# Patient Record
Sex: FEMALE | Race: WHITE | Employment: OTHER | URBAN - METROPOLITAN AREA
[De-identification: names, ages, dates, MRNs, and addresses within clinical notes are randomized per-mention and may not be internally consistent; named-entity substitution may affect disease eponyms.]

---

## 2017-04-20 ENCOUNTER — OFFICE VISIT (OUTPATIENT)
Dept: OBGYN CLINIC | Age: 32
End: 2017-04-20

## 2017-04-20 VITALS
HEIGHT: 69 IN | SYSTOLIC BLOOD PRESSURE: 120 MMHG | BODY MASS INDEX: 36.23 KG/M2 | DIASTOLIC BLOOD PRESSURE: 70 MMHG | WEIGHT: 244.6 LBS

## 2017-04-20 DIAGNOSIS — Z01.419 ENCOUNTER FOR GYNECOLOGICAL EXAMINATION WITHOUT ABNORMAL FINDING: Primary | ICD-10-CM

## 2017-04-20 DIAGNOSIS — Z80.3 FAMILY HISTORY OF BREAST CANCER IN MOTHER: ICD-10-CM

## 2017-04-20 NOTE — PROGRESS NOTES
Priti Leonardo is a [de-identified] P5,  36 y.o. female [facility] whose Patient's last menstrual period was 04/04/2017. who presents for her annual checkup. She is having no significant problems. Now has law partner    With regard to the Gardisil vaccine, she is older than the FDA approved age to receive it. Menstrual status:    Her periods are moderate in flow. She is using three to five pads or tampons per day, usually irregular and every 26-35 days. Has always been irregular off OC    She denies dysmenorrhea. She reports no premenstrual symptoms. Contraception:    The current method of family planning is condoms currently    Sexual history:    She  reports that she currently engages in sexual activity and has had male partners. She reports using the following method of birth control/protection: Pill. Patient states she has not taken the pill in 4 months due to her needing the rx refilled  Medical conditions:    Since her last annual GYN exam about 12/17/15 ago, she has not the following changes in her health history: none. Pap and Mammogram History:    Her most recent Pap smear was normal obtained 12/17/15    The patient has never had a mammogram. Had MRI 2014 normal    The patient does have a family history of breast cancer. - mother 28    Past Medical History:   Diagnosis Date    Dysmenorrhea     Dr. Alba Javed Family history of breast cancer     mother age 28. seeing Dr. Sherry Catalan    HPV vaccine counseling     gardasil series complete    Patellofemoral arthralgia of both knees     hx of rowing, catching     History reviewed. No pertinent surgical history.     Current Outpatient Prescriptions   Medication Sig Dispense Refill    Adelaide Colbyant 1/35, 28, 1-35 mg-mcg per tablet   4     Allergies: Augmentin [amoxicillin-pot clavulanate] and Penicillins   Social History     Social History    Marital status:      Spouse name: Von Calvillo Number of children: 0    Years of education: N/A Occupational History     [facility]     Social History Main Topics    Smoking status: Never Smoker    Smokeless tobacco: Never Used    Alcohol use Yes      Comment: 2-3 drinks per week    Drug use: No    Sexual activity: Yes     Partners: Male     Birth control/ protection: Pill     Other Topics Concern    Not on file     Social History Narrative     Tobacco History:  reports that she has never smoked. She has never used smokeless tobacco.  Alcohol Abuse:  reports that she drinks alcohol. Drug Abuse:  reports that she does not use illicit drugs.     Patient Active Problem List   Diagnosis Code    Family history of breast cancer Z80.3    Dysmenorrhea N94.6       Review of Systems - History obtained from the patient  Constitutional: negative for weight loss, fever, night sweats  HEENT: negative for hearing loss, earache, congestion, snoring, sorethroat  CV: negative for chest pain, palpitations, edema  Resp: negative for cough, shortness of breath, wheezing  GI: negative for change in bowel habits, abdominal pain, black or bloody stools  : negative for frequency, dysuria, hematuria, vaginal discharge  MSK: negative for back pain, joint pain, muscle pain  Breast: negative for breast lumps, nipple discharge, galactorrhea  Skin :negative for itching, rash, hives  Neuro: negative for dizziness, headache, confusion, weakness  Psych: negative for anxiety, depression, change in mood  Heme/lymph: negative for bleeding, bruising, pallor    Physical Exam    Visit Vitals    /70    Ht 5' 8.75\" (1.746 m)    Wt 244 lb 9.6 oz (110.9 kg)    LMP 04/04/2017    BMI 36.38 kg/m2       Constitutional  · Appearance: well-nourished, well developed, alert, in no acute distress    HENT  · Head and Face: appears normal    Neck  · Inspection/Palpation: normal appearance, no masses or tenderness  · Lymph Nodes: no lymphadenopathy present  · Thyroid: gland size normal, nontender, no nodules or masses present on palpation    Chest  · Respiratory Effort: breathing normal  · Auscultation: normal breath sounds    Cardiovascular  · Heart:  · Auscultation: regular rate and rhythm without murmur    Breasts  · Inspection of Breasts: breasts symmetrical, no skin changes, no discharge present, nipple appearance normal, no skin retraction present  · Palpation of Breasts and Axillae: no masses present on palpation, no breast tenderness  · Axillary Lymph Nodes: no lymphadenopathy present    Gastrointestinal  · Abdominal Examination: abdomen non-tender to palpation, normal bowel sounds, no masses present  · Liver and spleen: no hepatomegaly present, spleen not palpable  · Hernias: no hernias identified    Genitourinary  · External Genitalia: normal appearance for age, no discharge present, no tenderness present, no inflammatory lesions present, no masses present, no atrophy present  · Vagina: normal vaginal vault without central or paravaginal defects, no discharge present, no inflammatory lesions present, no masses present  · Bladder: non-tender to palpation  · Urethra: appears normal  · Cervix: normal   · Uterus: normal size, shape and consistency  · Adnexa: no adnexal tenderness present, no adnexal masses present  · Perineum: perineum within normal limits, no evidence of trauma, no rashes or skin lesions present  · Anus: anus within normal limits, no hemorrhoids present  · Inguinal Lymph Nodes: no lymphadenopathy present    Skin  · General Inspection: no rash, no lesions identified    Neurologic/Psychiatric  · Mental Status:  · Orientation: grossly oriented to person, place and time  · Mood and Affect: mood normal, affect appropriate    . Assessment:  Routine gynecologic examination  Her current medical status is satisfactory with no evidence of significant gynecologic issues.   Mother breast cancer age 28  Hx of irregular cycles  Plan:  Counseled re: diet, exercise, healthy lifestyle  Return for yearly wellness visits  Rec screening mammo - she will see Dr. Oliver Glaser again to discuss best option - may do MRI again  OC rx given - may not start if decides to conceive.  Advised to take vits

## 2017-04-20 NOTE — PATIENT INSTRUCTIONS
Breast Self-Exam: Care Instructions  Your Care Instructions  A breast self-exam is when you check your breasts for lumps or changes. This regular exam helps you learn how your breasts normally look and feel. Most breast problems or changes are not because of cancer. Breast self-exam is not a substitute for a mammogram. Having regular breast exams by your doctor and regular mammograms improve your chances of finding any problems with your breasts. Some women set a time each month to do a step-by-step breast self-exam. Other women like a less formal system. They might look at their breasts as they brush their teeth, or feel their breasts once in a while in the shower. If you notice a change in your breast, tell your doctor. Follow-up care is a key part of your treatment and safety. Be sure to make and go to all appointments, and call your doctor if you are having problems. Its also a good idea to know your test results and keep a list of the medicines you take. How do you do a breast self-exam?  · The best time to examine your breasts is usually one week after your menstrual period begins. Your breasts should not be tender then. If you do not have periods, you might do your exam on a day of the month that is easy to remember. · To examine your breasts:  ¨ Remove all your clothes above the waist and lie down. When you are lying down, your breast tissue spreads evenly over your chest wall, which makes it easier to feel all your breast tissue. ¨ Use the pads--not the fingertips--of the 3 middle fingers of your left hand to check your right breast. Move your fingers slowly in small coin-sized circles that overlap. ¨ Use three levels of pressure to feel of all your breast tissue. Use light pressure to feel the tissue close to the skin surface. Use medium pressure to feel a little deeper. Use firm pressure to feel your tissue close to your breastbone and ribs.  Use each pressure level to feel your breast tissue before moving on to the next spot. ¨ Check your entire breast, moving up and down as if following a strip from the collarbone to the bra line, and from the armpit to the ribs. Repeat until you have covered the entire breast.  ¨ Repeat this procedure for your left breast, using the pads of the 3 middle fingers of your right hand. · To examine your breasts while in the shower:  ¨ Place one arm over your head and lightly soap your breast on that side. ¨ Using the pads of your fingers, gently move your hand over your breast (in the strip pattern described above), feeling carefully for any lumps or changes. ¨ Repeat for the other breast.  · Have your doctor inspect anything you notice to see if you need further testing. Where can you learn more? Go to http://jared-leia.info/. Enter P148 in the search box to learn more about \"Breast Self-Exam: Care Instructions. \"  Current as of: July 26, 2016  Content Version: 11.2  © 3925-7385 [employer]. Care instructions adapted under license by [employer] (which disclaims liability or warranty for this information). If you have questions about a medical condition or this instruction, always ask your healthcare professional. [place] [address] any warranty or liability for your use of this information.

## 2017-05-08 ENCOUNTER — OFFICE VISIT (OUTPATIENT)
Dept: SURGERY | Age: 32
End: 2017-05-08

## 2017-05-08 ENCOUNTER — TELEPHONE (OUTPATIENT)
Dept: SURGERY | Age: 32
End: 2017-05-08

## 2017-05-08 VITALS
WEIGHT: 243 LBS | HEART RATE: 57 BPM | DIASTOLIC BLOOD PRESSURE: 68 MMHG | HEIGHT: 69 IN | BODY MASS INDEX: 35.99 KG/M2 | SYSTOLIC BLOOD PRESSURE: 123 MMHG

## 2017-05-08 DIAGNOSIS — Z91.89 AT HIGH RISK FOR BREAST CANCER: ICD-10-CM

## 2017-05-08 DIAGNOSIS — Z80.3 FAMILY HISTORY OF BREAST CANCER: Primary | ICD-10-CM

## 2017-05-08 NOTE — PROGRESS NOTES
HISTORY OF PRESENT ILLNESS  [place] is a 36 y.o. female. HPI   ESTABLISHED patient here for FH of breast cancer. FH is as below, and there have been no changes. She has no breast complaints today. Recently had her annual exam with Dr. Pooja Fountain. Has no breast lumps, no nipple discharge/retraction or skin change. She did have a breast MRI in 2014, but has not had one since then. Had a mammogram in 2014, and this was normal other than dense breast tissue. Family history includes mother diagnosed with breast cancer age 35(Mallory Zavala Pramod ), maternal cousin, maternal aunt also with breast cancer all post menopausal. Her mother was BRCA 1/2 negative (testing done several years ago).       [facility]    Physical Exam    ASSESSMENT and PLAN  {ASSESSMENT/PLAN:90169}

## 2017-05-08 NOTE — PATIENT INSTRUCTIONS
Breast Cancer (BRCA) Gene Testing: Care Instructions  Your Care Instructions  BRCA1 and BRCA2 are genes that help control normal cell growth. Sometimes, people inherit changes in one of these genes. These changes are called mutations. If you inherit a BRCA (say \"Sherif\") mutation, you have a greater risk of breast or ovarian cancer. You also have a higher risk of breast or ovarian cancer if you have a family history of them. Some people have a family history, but they don't have the BRCA mutation. To find out if you have the BRCA mutation, you can have a blood test. People who have the mutation have a higher risk for these cancers. But not everyone with the mutation gets cancer. Talk to your doctor about things that may increase your risk. Let your doctor know if you or a family member had or has:  · A positive test for BRCA. · Breast cancer before age 48. · Ovarian cancer at any age. · Male breast cancer. · Breast cancer in both breasts. · Both breast and ovarian cancer. · [place]. Your doctor may also want you to talk with a genetic counselor. The counselor can help you understand what the results of this test might mean. Follow-up care is a key part of your treatment and safety. Be sure to make and go to all appointments, and call your doctor if you are having problems. It's also a good idea to know your test results and keep a list of the medicines you take. Why should you have BRCA testing? You may feel better if the test shows that you don't have a BRCA mutation. This is called a negative result. If the test shows that you do have a BRCA mutation, it's called a positive result. In this case, you may be able to make some decisions that could reduce your cancer risk. The information may also be helpful to your family and loved ones. What are the risks of BRCA testing? A negative test may give you a false sense of security.  So you may not have the regular tests that help find cancer at an early stage. But a negative BRCA test does not mean that you will never have breast or ovarian cancer. A positive test result may cause anxiety or depression. A positive BRCA test does not mean that you will definitely get breast or ovarian cancer. It's important to think carefully about what the test results could mean for you. A genetic counselor can help you do this. What can you do to reduce the risk of breast cancer? Your risk for breast cancer increases as you get older. There is no known way to prevent breast cancer. But with some cancers, finding them early can increase your chances of successful treatment. Here are some steps you can take to help reduce your risk:  · Get familiar with the look and feel of your breasts. This will help you notice any changes. Call your doctor if you notice a change. · Have regular breast exams by your doctor or nurse. Ask your doctor how often you should get them. · Have regular mammograms. A mammogram is a picture of your breast tissue. It can find changes in your breast before you can feel them. Talk to your doctor about when to get this test.  You can also help take care of yourself and reduce your risk of cancer if you:  · Stay at a healthy weight. · Eat a healthy, low-fat diet. · Get some exercise every day. If you don't usually exercise, walking is a good way to start. · Don't smoke. If you need help quitting, talk to your doctor about stop-smoking programs and medicines. These can increase your chances of quitting for good. · Drink alcohol in moderation. Or don't drink alcohol at all. · Breastfeed. There is some evidence that breastfeeding may lower the risk of breast cancer. The benefit seems to be greatest in women who have  for longer than 12 months or who  several children. Men and women who do a gene test and find out that they have a BRCA gene change have some options to manage their cancer risk.   · Women who haven't had cancer may want to think about starting breast cancer screening at a younger age, taking medicine, and having preventive surgery. · Men may want to think about doing breast self-exams, having clinical breast exams, and having prostate cancer screening. If you have a BRCA gene change, talk with your doctor. He or she will help you manage your cancer risk. Where can you learn more? Go to http://jared-leia.info/. Enter U252 in the search box to learn more about \"Breast Cancer (BRCA) Gene Testing: Care Instructions. \"  Current as of: August 31, 2016  Content Version: 11.2  © 0125-6083 [employer]. Care instructions adapted under license by [employer] (which disclaims liability or warranty for this information). If you have questions about a medical condition or this instruction, always ask your healthcare professional. [address] any warranty or liability for your use of this information.

## 2017-05-08 NOTE — MR AVS SNAPSHOT
Visit Information Date & Time Provider Department Dept. Phone Encounter #  
 5/8/2017  9:25 PM Radha Campos MD [facility] [phone] [id number] Your Appointments 5/14/2018  8:30 AM  
Follow Up with Radha Campos MD  
[facility] [place] [address]) Appt Note: 1 year follow up/ cp$ 5-8-17 [facility] [place] [address] [facility] [address]  
  
   
 [place] [address] [place] [address] [facility] Upcoming Health Maintenance Date Due DTaP/Tdap/Td series (2 - Tdap) 7/23/2008 INFLUENZA AGE 9 TO ADULT 8/1/2017 PAP AKA CERVICAL CYTOLOGY 12/17/2018 Allergies as of 5/8/2017  Review Complete On: 5/8/2017 By: Joel Leone RN Severity Noted Reaction Type Reactions Augmentin [Amoxicillin-pot Clavulanate]  09/03/2014    Hives Bactrim [Sulfamethoprim]  05/08/2017    Hives Penicillins  09/03/2014    Unable to Obtain Current Immunizations  Reviewed on 11/3/2014 Name Date Hep B Vaccine 1/1/2005 Td 1/1/2005 Not reviewed this visit Vitals BP Pulse Height(growth percentile) Weight(growth percentile) LMP BMI  
 123/68 (!) 57 5' 8.75\" (1.746 m) 243 lb (110.2 kg) 04/04/2017 36.15 kg/m2 OB Status Smoking Status Having regular periods Never Smoker BMI and BSA Data Body Mass Index Body Surface Area  
 36.15 kg/m 2 2.31 m 2 Preferred Pharmacy Pharmacy Name Phone [id number], [address] [place] [address]  [phone] Your Updated Medication List  
  
Notice  As of 5/8/2017  4:16 PM  
 You have not been prescribed any medications. Patient Instructions Breast Cancer (BRCA) Gene Testing: Care Instructions Your Care Instructions BRCA1 and BRCA2 are genes that help control normal cell growth. Sometimes, people inherit changes in one of these genes.  These changes are called mutations. If you inherit a BRCA (say \"Sherif\") mutation, you have a greater risk of breast or ovarian cancer. You also have a higher risk of breast or ovarian cancer if you have a family history of them. Some people have a family history, but they don't have the BRCA mutation. To find out if you have the BRCA mutation, you can have a blood test. People who have the mutation have a higher risk for these cancers. But not everyone with the mutation gets cancer. Talk to your doctor about things that may increase your risk. Let your doctor know if you or a family member had or has: · A positive test for BRCA. · Breast cancer before age 48. · Ovarian cancer at any age. · Male breast cancer. · Breast cancer in both breasts. · Both breast and ovarian cancer. · [place]. Your doctor may also want you to talk with a genetic counselor. The counselor can help you understand what the results of this test might mean. Follow-up care is a key part of your treatment and safety. Be sure to make and go to all appointments, and call your doctor if you are having problems. It's also a good idea to know your test results and keep a list of the medicines you take. Why should you have BRCA testing? You may feel better if the test shows that you don't have a BRCA mutation. This is called a negative result. If the test shows that you do have a BRCA mutation, it's called a positive result. In this case, you may be able to make some decisions that could reduce your cancer risk. The information may also be helpful to your family and loved ones. What are the risks of BRCA testing? A negative test may give you a false sense of security. So you may not have the regular tests that help find cancer at an early stage. But a negative BRCA test does not mean that you will never have breast or ovarian cancer. A positive test result may cause anxiety or depression.  A positive BRCA test does not mean that you will definitely get breast or ovarian cancer. It's important to think carefully about what the test results could mean for you. A genetic counselor can help you do this. What can you do to reduce the risk of breast cancer? Your risk for breast cancer increases as you get older. There is no known way to prevent breast cancer. But with some cancers, finding them early can increase your chances of successful treatment. Here are some steps you can take to help reduce your risk: 
· Get familiar with the look and feel of your breasts. This will help you notice any changes. Call your doctor if you notice a change. · Have regular breast exams by your doctor or nurse. Ask your doctor how often you should get them. · Have regular mammograms. A mammogram is a picture of your breast tissue. It can find changes in your breast before you can feel them. Talk to your doctor about when to get this test. 
You can also help take care of yourself and reduce your risk of cancer if you: 
· Stay at a healthy weight. · Eat a healthy, low-fat diet. · Get some exercise every day. If you don't usually exercise, walking is a good way to start. · Don't smoke. If you need help quitting, talk to your doctor about stop-smoking programs and medicines. These can increase your chances of quitting for good. · Drink alcohol in moderation. Or don't drink alcohol at all. · Breastfeed. There is some evidence that breastfeeding may lower the risk of breast cancer. The benefit seems to be greatest in women who have  for longer than 12 months or who  several children. Men and women who do a gene test and find out that they have a BRCA gene change have some options to manage their cancer risk. · Women who haven't had cancer may want to think about starting breast cancer screening at a younger age, taking medicine, and having preventive surgery. · Men may want to think about doing breast self-exams, having clinical breast exams, and having prostate cancer screening. If you have a BRCA gene change, talk with your doctor. He or she will help you manage your cancer risk. Where can you learn more? Go to http://jared-leia.info/. Enter U252 in the search box to learn more about \"Breast Cancer (BRCA) Gene Testing: Care Instructions. \" Current as of: August 31, 2016 Content Version: 11.2 © 4170-3483 [employer]. Care instructions adapted under license by [employer] (which disclaims liability or warranty for this information). If you have questions about a medical condition or this instruction, always ask your healthcare professional. [address] any warranty or liability for your use of this information. Please provide this summary of care documentation to your next provider. Your primary care clinician is listed as Nicole Wooten. If you have any questions after today's visit, please call [phone].

## 2017-05-08 NOTE — PROGRESS NOTES
HISTORY OF PRESENT ILLNESS  [place] is a 36 y.o. female. HPI  ESTABLISHED patient here for FH of breast cancer. FH is as below, and there have been no changes. She has no breast complaints today. Recently had her annual exam with Dr. Abdoulaye Blandon. Has no breast lumps, no nipple discharge/retraction or skin change. She did have a breast MRI in , but has not had one since then. Had a mammogram in 2014, and this was normal other than dense breast tissue. Family history includes mother diagnosed with breast cancer age 35(Mallory Dawson ), maternal cousin, maternal aunt also with breast cancer all post menopausal. Her mother was BRCA 1/2 negative (testing done several years ago). Past Medical History:   Diagnosis Date    Dysmenorrhea     Dr. Christos Crum Family history of breast cancer     mother age 28. seeing Dr. Diaz Garcia    HPV vaccine counseling     gardasil series complete    Patellofemoral arthralgia of both knees     hx of rowing, catching       History reviewed. No pertinent surgical history. Social History     Social History    Marital status:      Spouse name: Susan Current Number of children: 0    Years of education: N/A     Occupational History     [employer]     Social History Main Topics    Smoking status: Never Smoker    Smokeless tobacco: Never Used    Alcohol use Yes      Comment: 2-3 drinks per week    Drug use: No    Sexual activity: Yes     Partners: Male     Birth control/ protection: Pill     Other Topics Concern    Not on file     Social History Narrative       No current outpatient prescriptions on file prior to visit. No current facility-administered medications on file prior to visit.         Allergies   Allergen Reactions    Augmentin [Amoxicillin-Pot Clavulanate] Hives    Bactrim [Sulfamethoprim] Hives    Penicillins Unable to Obtain       OB History      Para Term  AB TAB SAB Ectopic Multiple Living    0 0 0 0 0 0 0 0 0 0        Obstetric Comments    Menarche:  17. LMP: 08/26/2014. # of Children:  n/a. Age at Delivery of First Child:  n/a. Hysterectomy/oophorectomy:  NO/NO. Breast Bx no. Hx of Breast Feeding:  n/a. BCP:  yes. Hormone therapy:  no.          ROS  Constitutional: Negative    HENT: Negative. Eyes: Negative. Respiratory: Negative. Cardiovascular: Negative. Gastrointestinal: Negative. Genitourinary: Negative. Musculoskeletal: Negative. Skin: Negative. Neurological: Negative. Endo/Heme/Allergies: Negative. Psychiatric/Behavioral: Negative. Physical Exam   Cardiovascular: Normal rate and normal heart sounds. Pulmonary/Chest: Breath sounds normal. Right breast exhibits no inverted nipple, no mass, no nipple discharge, no skin change and no tenderness. Left breast exhibits no inverted nipple, no mass, no nipple discharge, no skin change and no tenderness. Breasts are symmetrical.   Lymphadenopathy:        Right cervical: No superficial cervical, no deep cervical and no posterior cervical adenopathy present. Left cervical: No superficial cervical, no deep cervical and no posterior cervical adenopathy present. Right axillary: No pectoral and no lateral adenopathy present. Left axillary: No pectoral and no lateral adenopathy present. ASSESSMENT and PLAN    ICD-10-CM ICD-9-CM    1. Family history of breast cancer Z80.3 V16.3    2. At high risk for breast cancer Z91.89 V49.89       Pt with significant family hx of BC and doing well. Her lifetime risk for breast cancer is at 24.3% (Barbaraer-Nicole). Discussed Videssa Breast test as possible additional screening option, which uses blood work to test for tumor antibodies. Pt expressed interest in this test and will have blood drawn today for Videssa. Will also order MRI and f/u once these results become available. This plan was reviewed with the patient and patient agrees. All questions were answered.     Written by Jim Del Castillo Marielos Ko, as dictated by Dr. Michael Cameron MD.

## 2017-05-09 NOTE — COMMUNICATION BODY
HISTORY OF PRESENT ILLNESS  [place] is a 36 y.o. female. HPI  ESTABLISHED patient here for FH of breast cancer. FH is as below, and there have been no changes. She has no breast complaints today. Recently had her annual exam with Dr. Lubna Almeida. Has no breast lumps, no nipple discharge/retraction or skin change. She did have a breast MRI in , but has not had one since then. Had a mammogram in 2014, and this was normal other than dense breast tissue. Family history includes mother diagnosed with breast cancer age 35(Mallory Mitchell ), maternal cousin, maternal aunt also with breast cancer all post menopausal. Her mother was BRCA 1/2 negative (testing done several years ago). Past Medical History:   Diagnosis Date    Dysmenorrhea     Dr. Steiner Senior Family history of breast cancer     mother age 28. seeing Dr. Marcela Todd    HPV vaccine counseling     gardasil series complete    Patellofemoral arthralgia of both knees     hx of rowing, catching       History reviewed. No pertinent surgical history. Social History     Social History    Marital status:      Spouse name: Sergio Conrad Number of children: 0    Years of education: N/A     Occupational History     [employer]     Social History Main Topics    Smoking status: Never Smoker    Smokeless tobacco: Never Used    Alcohol use Yes      Comment: 2-3 drinks per week    Drug use: No    Sexual activity: Yes     Partners: Male     Birth control/ protection: Pill     Other Topics Concern    Not on file     Social History Narrative       No current outpatient prescriptions on file prior to visit. No current facility-administered medications on file prior to visit.         Allergies   Allergen Reactions    Augmentin [Amoxicillin-Pot Clavulanate] Hives    Bactrim [Sulfamethoprim] Hives    Penicillins Unable to Obtain       OB History      Para Term  AB TAB SAB Ectopic Multiple Living    0 0 0 0 0 0 0 0 0 0        Obstetric Comments    Menarche:  17. LMP: 08/26/2014. # of Children:  n/a. Age at Delivery of First Child:  n/a. Hysterectomy/oophorectomy:  NO/NO. Breast Bx no. Hx of Breast Feeding:  n/a. BCP:  yes. Hormone therapy:  no.          ROS  Constitutional: Negative    HENT: Negative. Eyes: Negative. Respiratory: Negative. Cardiovascular: Negative. Gastrointestinal: Negative. Genitourinary: Negative. Musculoskeletal: Negative. Skin: Negative. Neurological: Negative. Endo/Heme/Allergies: Negative. Psychiatric/Behavioral: Negative. Physical Exam   Cardiovascular: Normal rate and normal heart sounds. Pulmonary/Chest: Breath sounds normal. Right breast exhibits no inverted nipple, no mass, no nipple discharge, no skin change and no tenderness. Left breast exhibits no inverted nipple, no mass, no nipple discharge, no skin change and no tenderness. Breasts are symmetrical.   Lymphadenopathy:        Right cervical: No superficial cervical, no deep cervical and no posterior cervical adenopathy present. Left cervical: No superficial cervical, no deep cervical and no posterior cervical adenopathy present. Right axillary: No pectoral and no lateral adenopathy present. Left axillary: No pectoral and no lateral adenopathy present. ASSESSMENT and PLAN    ICD-10-CM ICD-9-CM    1. Family history of breast cancer Z80.3 V16.3    2. At high risk for breast cancer Z91.89 V49.89       Pt with significant family hx of BC and doing well. Her lifetime risk for breast cancer is at 24.3% (Barbaraer-Nicole). Discussed Videssa Breast test as possible additional screening option, which uses blood work to test for tumor antibodies. Pt expressed interest in this test and will have blood drawn today for Videssa. Will also order MRI and f/u once these results become available. This plan was reviewed with the patient and patient agrees. All questions were answered.     Written by Venkata Guajardo Wendy Robledo, as dictated by Dr. Sae Cota MD.

## 2017-05-11 ENCOUNTER — DOCUMENTATION ONLY (OUTPATIENT)
Dept: SURGERY | Age: 32
End: 2017-05-11

## 2017-05-23 ENCOUNTER — TELEPHONE (OUTPATIENT)
Dept: SURGERY | Age: 32
End: 2017-05-23

## 2017-05-23 NOTE — TELEPHONE ENCOUNTER
L/M for patient per HIPAA that her Videssa Breast test came back low protein signature. I advised her to continue with the plan to get the breast MRI and follow-up per Dr. Ayan Fraga. I left my name and number for her to call me back if she had questions.

## 2017-06-21 ENCOUNTER — OFFICE VISIT (OUTPATIENT)
Dept: INTERNAL MEDICINE CLINIC | Age: 32
End: 2017-06-21

## 2017-06-21 VITALS
BODY MASS INDEX: 35.99 KG/M2 | RESPIRATION RATE: 12 BRPM | HEIGHT: 69 IN | SYSTOLIC BLOOD PRESSURE: 113 MMHG | HEART RATE: 64 BPM | WEIGHT: 243 LBS | DIASTOLIC BLOOD PRESSURE: 77 MMHG | TEMPERATURE: 97.7 F

## 2017-06-21 DIAGNOSIS — Z20.9 HISTORY OF EXPOSURE TO INFECTIOUS DISEASE: ICD-10-CM

## 2017-06-21 DIAGNOSIS — R11.0 NAUSEA: ICD-10-CM

## 2017-06-21 DIAGNOSIS — R19.7 DIARRHEA OF PRESUMED INFECTIOUS ORIGIN: Primary | ICD-10-CM

## 2017-06-21 RX ORDER — METRONIDAZOLE 500 MG/1
500 TABLET ORAL 3 TIMES DAILY
Qty: 21 TAB | Refills: 0 | Status: SHIPPED | OUTPATIENT
Start: 2017-06-21 | End: 2018-05-14 | Stop reason: ALTCHOICE

## 2017-06-21 RX ORDER — ONDANSETRON 8 MG/1
8 TABLET, ORALLY DISINTEGRATING ORAL
Qty: 30 TAB | Refills: 0 | Status: SHIPPED | OUTPATIENT
Start: 2017-06-21 | End: 2018-05-14 | Stop reason: ALTCHOICE

## 2017-06-21 NOTE — PROGRESS NOTES
HISTORY OF PRESENT ILLNESS  [place] is a 36 y.o. female. HPI  Presents accompanied by her  with complaints of fever, chills since yesterday morning, then developed profuse watery diarrhea since 3 pm yesterday afternoon. Has passed 15+ stools since then. Concerned about possible C. Diff infection since she and her  were assisting his father in [address] [facility] who was recently hospitalized for dehydration and diarrheal illness and was subsequently diagnosed with C. Diff infection. She reports that she was primarily the one who was cleaning the bathroom to assist her father-in-law. She has been drinking fluids but does not have much of an appetite. Denies nausea or vomiting. Has not noted any blood in stools. Fever and chills have been mild today but she had sweats last night. Having cramping abdominal pain that has been generalized. Has not taken any OTC medications for symptoms and denies recent personal antibiotic use. Review of Systems   Constitutional: Positive for chills, fever and malaise/fatigue. Respiratory: Negative for cough. Cardiovascular: Negative for chest pain and palpitations. Gastrointestinal: Positive for abdominal pain, diarrhea and nausea. Negative for blood in stool and vomiting. Genitourinary: Negative for dysuria and frequency. Musculoskeletal: Positive for myalgias. Skin: Negative for rash. Neurological: Negative for dizziness, tingling and headaches. /77 (BP 1 Location: Left arm, BP Patient Position: Sitting)  Pulse 64  Temp 97.7 °F (36.5 °C)  Resp 12  Ht 5' 8.75\" (1.746 m)  Wt 243 lb (110.2 kg)  BMI 36.15 kg/m2  Physical Exam   Constitutional: She is oriented to person, place, and time. She appears well-developed and well-nourished. HENT:   Head: Normocephalic and atraumatic. Neck: Normal range of motion. Neck supple. Cardiovascular: Normal rate and regular rhythm.     Pulmonary/Chest: Effort normal and breath sounds normal. She has no wheezes. Abdominal: Soft. Bowel sounds are increased. There is generalized tenderness. There is no rebound and no guarding. Neurological: She is alert and oriented to person, place, and time. Psychiatric: She has a normal mood and affect. Nursing note and vitals reviewed. ASSESSMENT and [address] was seen today for diarrhea. Diagnoses and all orders for this visit:    Diarrhea of presumed infectious origin -- will send out stool samples but treat empirically with Flagyl as she has been exposed to stool infected with C. Diff. Cautioned regarding avoidance of alcohol while taking Flagyl; she indicated understanding.  -     C DIFFICILE TOXIN A & B BY EIA  -     OVA+PARASITE + GIARDIA  -     CULTURE, STOOL  -     CBC WITH AUTOMATED DIFF  -     METABOLIC PANEL, COMPREHENSIVE  -     metroNIDAZOLE (FLAGYL) 500 mg tablet; Take 1 Tab by mouth three (3) times daily. History of exposure to infectious disease  -     metroNIDAZOLE (FLAGYL) 500 mg tablet; Take 1 Tab by mouth three (3) times daily. Nausea  -     ondansetron (ZOFRAN ODT) 8 mg disintegrating tablet; Take 1 Tab by mouth every eight (8) hours as needed for Nausea.       lab results and schedule of future lab studies reviewed with patient  reviewed diet, exercise and weight control  reviewed medications and side effects in detail

## 2017-06-21 NOTE — PATIENT INSTRUCTIONS
Diarrhea: Care Instructions  Your Care Instructions    Diarrhea is loose, watery stools (bowel movements). The exact cause is often hard to find. Sometimes diarrhea is your body's way of getting rid of what caused an upset stomach. Viruses, food poisoning, and many medicines can cause diarrhea. Some people get diarrhea in response to emotional stress, anxiety, or certain foods. Almost everyone has diarrhea now and then. It usually isn't serious, and your stools will return to normal soon. The important thing to do is replace the fluids you have lost, so you can prevent dehydration. The doctor has checked you carefully, but problems can develop later. If you notice any problems or new symptoms, get medical treatment right away. Follow-up care is a key part of your treatment and safety. Be sure to make and go to all appointments, and call your doctor if you are having problems. It's also a good idea to know your test results and keep a list of the medicines you take. How can you care for yourself at home? · Watch for signs of dehydration, which means your body has lost too much water. Dehydration is a serious condition and should be treated right away. Signs of dehydration are:  ¨ Increasing thirst and dry eyes and mouth. ¨ Feeling faint or lightheaded. ¨ Darker urine, and a smaller amount of urine than normal.  · To prevent dehydration, drink plenty of fluids, enough so that your urine is light yellow or clear like water. Choose water and other caffeine-free clear liquids until you feel better. If you have kidney, heart, or liver disease and have to limit fluids, talk with your doctor before you increase the amount of fluids you drink. · Begin eating small amounts of mild foods the next day, if you feel like it. ¨ Try yogurt that has live cultures of Lactobacillus. (Check the label.)  ¨ Avoid spicy foods, fruits, alcohol, and caffeine until 48 hours after all symptoms are gone.   ¨ Avoid chewing gum that contains sorbitol. ¨ Avoid dairy products (except for yogurt with Lactobacillus) while you have diarrhea and for 3 days after symptoms are gone. · The doctor may recommend that you take over-the-counter medicine, such as loperamide (Imodium), if you still have diarrhea after 6 hours. Read and follow all instructions on the label. Do not use this medicine if you have bloody diarrhea, a high fever, or other signs of serious illness. Call your doctor if you think you are having a problem with your medicine. When should you call for help? Call 911 anytime you think you may need emergency care. For example, call if:  · You passed out (lost consciousness). · Your stools are maroon or very bloody. Call your doctor now or seek immediate medical care if:  · You are dizzy or lightheaded, or you feel like you may faint. · Your stools are black and look like tar, or they have streaks of blood. · You have new or worse belly pain. · You have symptoms of dehydration, such as:  ¨ Dry eyes and a dry mouth. ¨ Passing only a little dark urine. ¨ Feeling thirstier than usual.  · You have a new or higher fever. Watch closely for changes in your health, and be sure to contact your doctor if:  · Your diarrhea is getting worse. · You see pus in the diarrhea. · You are not getting better after 2 days (48 hours). Where can you learn more? Go to http://jared-leia.info/. Enter L821 in the search box to learn more about \"Diarrhea: Care Instructions. \"  Current as of: March 20, 2017  Content Version: 11.3  © 8593-0181 [employer]. Care instructions adapted under license by [employer] (which disclaims liability or warranty for this information). If you have questions about a medical condition or this instruction, always ask your healthcare professional. [address] any warranty or liability for your use of this information.        Learning About Clostridium Difficile Infection  What is C. diff? Clostridium difficile (also called C. diff) is a type of bacteria. It can cause swelling and irritation of the colon (colitis). Colitis can cause watery diarrhea, fever, loss of appetite, nausea, dehydration, and belly pain and tenderness. C. diff infection is most common in people who are taking antibiotics or have taken them in the past few weeks. It is also more likely to infect older people and people who are getting chemotherapy for cancer. Though colitis can be mild, it can become serious, especially for people who have a weak immune system. The large intestine normally contains many good bacteria that keep it healthy and do not cause disease. When you take an antibiotic to kill bacteria that do cause disease, your medicine may also kill the good bacteria. This can allow C. diff bacteria to grow and release harmful toxins. If you are still taking an antibiotic, your doctor may have you stop taking it, because it may have led to the C. diff infection. Your doctor may then treat C. diff colitis with a different antibiotic. It's important that your C. diff does not spread to other people. Preventing the spread of C. diff is a top health concern in hospitals and long-term care facilities. How does C. diff get passed to other people? C. diff can be passed from person to person. It can also be passed from person to object to person. If you have a C. diff infection, you can spread it if you don't wash your hands well enough after you use the bathroom. Anything you touch, like a door handle, bed rail, or phone, can then carry the bacteria. C. diff can live on objects for a very long time. C. diff infection spreads to other people when they touch an object that has C. diff on it and then use their hands to eat or rub their faces. Health care workers can pass C. diff from room to room in a hospital or a long-term care facility. Visitors can also spread it.   How can you avoid spreading C. diff infection? When you have C. diff, you and everyone around you must take special care to avoid spreading it. You must use extra care after you use the bathroom. The best way to prevent spreading C. diff is to wash your hands well and often. The rubbing and rinsing action helps wash the bacteria from your skin. · Wash your hands with running water and soap. · Rub your hands together for at least 20 seconds. · Pay special attention to your wrists, the backs of your hands, between your fingers, and under your fingernails. · Don't touch the faucet with your hand. Use a paper towel to turn off the water. Don't use an alcohol-based hand  instead of washing your hands with soap and water.  will not kill C. diff. In the hospital  · Ask your nurse if visitors need to wear special gowns and gloves. · Remind your visitors to wash their hands before they visit and as they leave your room. · Expect the hospital staff to use extra precautions. That means following safe hand-washing routines and wearing gloves and gowns. They may wear masks when cleaning. If it seems that a staff person isn't being as careful as you expect, ask what steps the hospital uses to control the spread of your infection. This might help remind him or her to be more careful. At home  After your diarrhea is better, you are much less likely to spread a C. diff infection. Still, you must:  · Regularly clean bathroom surfaces with bleach water to kill any C. diff spores. Use 1 part bleach to 10 parts water. · Wash your hands often and well, especially after you use the bathroom and before you make and eat food. · Remind everyone in your home to also wash hands often. If you start having diarrhea again, call your doctor right away. Your doctor will tell you when you no longer need to take special care to prevent spreading C. diff. Follow-up care is a key part of your treatment and safety.  Be sure to make and go to all appointments, and call your doctor if you are having problems. It's also a good idea to know your test results and keep a list of the medicines you take. Where can you learn more? Go to http://jared-leia.info/. Enter E204 in the search box to learn more about \"Learning About Clostridium Difficile Infection. \"  Current as of: March 3, 2017  Content Version: 11.3  © 9218-4613 [employer]. Care instructions adapted under license by [employer] (which disclaims liability or warranty for this information). If you have questions about a medical condition or this instruction, always ask your healthcare professional. [address] any warranty or liability for your use of this information.

## 2017-06-21 NOTE — MR AVS SNAPSHOT
Visit Information Date & Time Provider Department Dept. Phone Encounter #  
 6/21/2017 11:00 AM Ginger Muniz NP [facility] [address] [place] [id number] Your Appointments 5/14/2018  8:30 AM  
Follow Up with Emiliano Sol MD  
[address]) Appt Note: 1 year follow up/ cp$ 5-8-17 ls [address] Lona Garza Oh [address]  
  
   
 [address] [place] [address] [facility] Upcoming Health Maintenance Date Due DTaP/Tdap/Td series (2 - Tdap) 7/23/2008 INFLUENZA AGE 9 TO ADULT 8/1/2017 PAP AKA CERVICAL CYTOLOGY 12/17/2018 Allergies as of 6/21/2017  Review Complete On: 6/21/2017 By: Ginger Muniz NP Severity Noted Reaction Type Reactions Augmentin [Amoxicillin-pot Clavulanate]  09/03/2014    Hives Bactrim [Sulfamethoprim]  05/08/2017    Hives Penicillins  09/03/2014    Unable to Obtain Current Immunizations  Reviewed on 11/3/2014 Name Date Hep B Vaccine 1/1/2005 Td 1/1/2005 Not reviewed this visit You Were Diagnosed With   
  
 Codes Comments Diarrhea of presumed infectious origin    -  Primary ICD-10-CM: A09 ICD-9-CM: 674. 3 Vitals BP Pulse Temp Resp Height(growth percentile) Weight(growth percentile) 113/77 (BP 1 Location: Left arm, BP Patient Position: Sitting) 64 97.7 °F (36.5 °C) 12 5' 8.75\" (1.746 m) 243 lb (110.2 kg) BMI OB Status Smoking Status 36.15 kg/m2 Having regular periods Never Smoker Vitals History BMI and BSA Data Body Mass Index Body Surface Area  
 36.15 kg/m 2 2.31 m 2 Preferred Pharmacy Pharmacy Name Phone [id number] [address], [id number] [place] [address]  [phone] Your Updated Medication List  
  
   
This list is accurate as of: 6/21/17 11:18 AM.  Always use your most recent med list.  
  
  
  
  
 metroNIDAZOLE 500 mg tablet Commonly known as:  FLAGYL Take 1 Tab by mouth three (3) times daily. ondansetron 8 mg disintegrating tablet Commonly known as:  ZOFRAN ODT Take 1 Tab by mouth every eight (8) hours as needed for Nausea. [address] 1/35 (28) 1-35 mg-mcg Tab Generic drug:  norethindrone-ethinyl estradiol Take  by mouth. Prescriptions Sent to Pharmacy Refills  
 metroNIDAZOLE (FLAGYL) 500 mg tablet 0 Sig: Take 1 Tab by mouth three (3) times daily. Class: Normal  
 Pharmacy: [id number]- [address] [facility], [address] [place] [address] Dr Ph #: [phone] Route: Oral  
 ondansetron (ZOFRAN ODT) 8 mg disintegrating tablet 0 Sig: Take 1 Tab by mouth every eight (8) hours as needed for Nausea. Class: Normal  
 [id number] [address] [facility], [address] [place] [address] Dr Ph #: [phone] Route: Oral  
  
We Performed the Following C DIFFICILE TOXIN A & B BY EIA [11651 CPT(R)] CBC WITH AUTOMATED DIFF [22811 CPT(R)] CULTURE, STOOL [id number] CPT(R)] METABOLIC PANEL, COMPREHENSIVE [11721 CPT(R)] OVA+PARASITE + GIARDIA [[id number] Custom] Patient Instructions Diarrhea: Care Instructions Your Care Instructions Diarrhea is loose, watery stools (bowel movements). The exact cause is often hard to find. Sometimes diarrhea is your body's way of getting rid of what caused an upset stomach. Viruses, food poisoning, and many medicines can cause diarrhea. Some people get diarrhea in response to emotional stress, anxiety, or certain foods. Almost everyone has diarrhea now and then. It usually isn't serious, and your stools will return to normal soon. The important thing to do is replace the fluids you have lost, so you can prevent dehydration. The doctor has checked you carefully, but problems can develop later. If you notice any problems or new symptoms, get medical treatment right away. Follow-up care is a key part of your treatment and safety. Be sure to make and go to all appointments, and call your doctor if you are having problems. It's also a good idea to know your test results and keep a list of the medicines you take. How can you care for yourself at home? · Watch for signs of dehydration, which means your body has lost too much water. Dehydration is a serious condition and should be treated right away. Signs of dehydration are: 
¨ Increasing thirst and dry eyes and mouth. ¨ Feeling faint or lightheaded. ¨ Darker urine, and a smaller amount of urine than normal. 
· To prevent dehydration, drink plenty of fluids, enough so that your urine is light yellow or clear like water. Choose water and other caffeine-free clear liquids until you feel better. If you have kidney, heart, or liver disease and have to limit fluids, talk with your doctor before you increase the amount of fluids you drink. · Begin eating small amounts of mild foods the next day, if you feel like it. ¨ Try yogurt that has live cultures of Lactobacillus. (Check the label.) ¨ Avoid spicy foods, fruits, alcohol, and caffeine until 48 hours after all symptoms are gone. ¨ Avoid chewing gum that contains sorbitol. ¨ Avoid dairy products (except for yogurt with Lactobacillus) while you have diarrhea and for 3 days after symptoms are gone. · The doctor may recommend that you take over-the-counter medicine, such as loperamide (Imodium), if you still have diarrhea after 6 hours. Read and follow all instructions on the label. Do not use this medicine if you have bloody diarrhea, a high fever, or other signs of serious illness. Call your doctor if you think you are having a problem with your medicine. When should you call for help? Call 911 anytime you think you may need emergency care. For example, call if: 
· You passed out (lost consciousness). · Your stools are maroon or very bloody. Call your doctor now or seek immediate medical care if: 
· You are dizzy or lightheaded, or you feel like you may faint. · Your stools are black and look like tar, or they have streaks of blood. · You have new or worse belly pain. · You have symptoms of dehydration, such as: ¨ Dry eyes and a dry mouth. ¨ Passing only a little dark urine. ¨ Feeling thirstier than usual. 
· You have a new or higher fever. Watch closely for changes in your health, and be sure to contact your doctor if: 
· Your diarrhea is getting worse. · You see pus in the diarrhea. · You are not getting better after 2 days (48 hours). Where can you learn more? Go to http://jared-leia.info/. Enter X241 in the search box to learn more about \"Diarrhea: Care Instructions. \" Current as of: March 20, 2017 Content Version: 11.3 © 5461-0330 [employer]. Care instructions adapted under license by [employer] (which disclaims liability or warranty for this information). If you have questions about a medical condition or this instruction, always ask your healthcare professional. [place] [address] any warranty or liability for your use of this information. Learning About Clostridium Difficile Infection What is C. diff? Clostridium difficile (also called C. diff) is a type of bacteria. It can cause swelling and irritation of the colon (colitis). Colitis can cause watery diarrhea, fever, loss of appetite, nausea, dehydration, and belly pain and tenderness. C. diff infection is most common in people who are taking antibiotics or have taken them in the past few weeks. It is also more likely to infect older people and people who are getting chemotherapy for cancer. Though colitis can be mild, it can become serious, especially for people who have a weak immune system.  
The large intestine normally contains many good bacteria that keep it healthy and do not cause disease. When you take an antibiotic to kill bacteria that do cause disease, your medicine may also kill the good bacteria. This can allow C. diff bacteria to grow and release harmful toxins. If you are still taking an antibiotic, your doctor may have you stop taking it, because it may have led to the C. diff infection. Your doctor may then treat C. diff colitis with a different antibiotic. It's important that your C. diff does not spread to other people. Preventing the spread of C. diff is a top health concern in hospitals and long-term care facilities. How does C. diff get passed to other people? C. diff can be passed from person to person. It can also be passed from person to object to person. If you have a C. diff infection, you can spread it if you don't wash your hands well enough after you use the bathroom. Anything you touch, like a door handle, bed rail, or phone, can then carry the bacteria. C. diff can live on objects for a very long time. C. diff infection spreads to other people when they touch an object that has C. diff on it and then use their hands to eat or rub their faces. Health care workers can pass C. diff from room to room in a hospital or a long-term care facility. Visitors can also spread it. How can you avoid spreading C. diff infection? When you have C. diff, you and everyone around you must take special care to avoid spreading it. You must use extra care after you use the bathroom. The best way to prevent spreading C. diff is to wash your hands well and often. The rubbing and rinsing action helps wash the bacteria from your skin. · Wash your hands with running water and soap. · Rub your hands together for at least 20 seconds. · Pay special attention to your wrists, the backs of your hands, between your fingers, and under your fingernails. · Don't touch the faucet with your hand. Use a paper towel to turn off the water. Don't use an alcohol-based hand  instead of washing your hands with soap and water.  will not kill C. diff. In the hospital 
· Ask your nurse if visitors need to wear special gowns and gloves. · Remind your visitors to wash their hands before they visit and as they leave your room. · Expect the hospital staff to use extra precautions. That means following safe hand-washing routines and wearing gloves and gowns. They may wear masks when cleaning. If it seems that a staff person isn't being as careful as you expect, ask what steps the hospital uses to control the spread of your infection. This might help remind him or her to be more careful. At home After your diarrhea is better, you are much less likely to spread a C. diff infection. Still, you must: 
· Regularly clean bathroom surfaces with bleach water to kill any C. diff spores. Use 1 part bleach to 10 parts water. · Wash your hands often and well, especially after you use the bathroom and before you make and eat food. · Remind everyone in your home to also wash hands often. If you start having diarrhea again, call your doctor right away. Your doctor will tell you when you no longer need to take special care to prevent spreading C. diff. Follow-up care is a key part of your treatment and safety. Be sure to make and go to all appointments, and call your doctor if you are having problems. It's also a good idea to know your test results and keep a list of the medicines you take. Where can you learn more? Go to http://jared-leia.info/. Enter P406 in the search box to learn more about \"Learning About Clostridium Difficile Infection. \" Current as of: March 3, 2017 Content Version: 11.3 © 5641-8426 [email or web address]. Care instructions adapted under license by [email or web address] (which disclaims liability or warranty for this information).  If you have questions about a medical condition or this instruction, always ask your healthcare professional. [address] any warranty or liability for your use of this information. Please provide this summary of care documentation to your next provider. Your primary care clinician is listed as Stalin Berg. If you have any questions after today's visit, please call [phone].

## 2017-06-22 LAB
ALBUMIN SERPL-MCNC: 4.4 G/DL (ref 3.5–5.5)
ALBUMIN/GLOB SERPL: 1.6 {RATIO} (ref 1.2–2.2)
ALP SERPL-CCNC: 68 IU/L (ref 39–117)
ALT SERPL-CCNC: 16 IU/L (ref 0–32)
AST SERPL-CCNC: 17 IU/L (ref 0–40)
BASOPHILS # BLD AUTO: 0 X10E3/UL (ref 0–0.2)
BASOPHILS NFR BLD AUTO: 0 %
BILIRUB SERPL-MCNC: 0.4 MG/DL (ref 0–1.2)
BUN SERPL-MCNC: 10 MG/DL (ref 6–20)
BUN/CREAT SERPL: 13 (ref 9–23)
CALCIUM SERPL-MCNC: 8.9 MG/DL (ref 8.7–10.2)
CHLORIDE SERPL-SCNC: 102 MMOL/L (ref 96–106)
CO2 SERPL-SCNC: 18 MMOL/L (ref 18–29)
CREAT SERPL-MCNC: 0.78 MG/DL (ref 0.57–1)
EOSINOPHIL # BLD AUTO: 0 X10E3/UL (ref 0–0.4)
EOSINOPHIL NFR BLD AUTO: 0 %
ERYTHROCYTE [DISTWIDTH] IN BLOOD BY AUTOMATED COUNT: 13.5 % (ref 12.3–15.4)
GLOBULIN SER CALC-MCNC: 2.8 G/DL (ref 1.5–4.5)
GLUCOSE SERPL-MCNC: 85 MG/DL (ref 65–99)
HCT VFR BLD AUTO: 46.5 % (ref 34–46.6)
HGB BLD-MCNC: 16.1 G/DL (ref 11.1–15.9)
IMM GRANULOCYTES # BLD: 0 X10E3/UL (ref 0–0.1)
IMM GRANULOCYTES NFR BLD: 0 %
LYMPHOCYTES # BLD AUTO: 1.2 X10E3/UL (ref 0.7–3.1)
LYMPHOCYTES NFR BLD AUTO: 32 %
MCH RBC QN AUTO: 31.8 PG (ref 26.6–33)
MCHC RBC AUTO-ENTMCNC: 34.6 G/DL (ref 31.5–35.7)
MCV RBC AUTO: 92 FL (ref 79–97)
MONOCYTES # BLD AUTO: 0.6 X10E3/UL (ref 0.1–0.9)
MONOCYTES NFR BLD AUTO: 16 %
NEUTROPHILS # BLD AUTO: 1.9 X10E3/UL (ref 1.4–7)
NEUTROPHILS NFR BLD AUTO: 52 %
PLATELET # BLD AUTO: 250 X10E3/UL (ref 150–379)
POTASSIUM SERPL-SCNC: 4.1 MMOL/L (ref 3.5–5.2)
PROT SERPL-MCNC: 7.2 G/DL (ref 6–8.5)
RBC # BLD AUTO: 5.06 X10E6/UL (ref 3.77–5.28)
SODIUM SERPL-SCNC: 137 MMOL/L (ref 134–144)
WBC # BLD AUTO: 3.6 X10E3/UL (ref 3.4–10.8)

## 2017-06-22 NOTE — PROGRESS NOTES
Please call patient and let her know that her blood tests were normal; white blood cell count normal and her electrolytes look good. We will let her know the results of the stool studies as soon as we receive them.

## 2017-06-25 LAB — C DIFF TOX A+B STL QL IA: NEGATIVE

## 2017-06-28 ENCOUNTER — TELEPHONE (OUTPATIENT)
Dept: INTERNAL MEDICINE CLINIC | Age: 32
End: 2017-06-28

## 2017-06-29 LAB
CAMPYLOBACTER STL CULT: NORMAL
E COLI SXT STL QL IA: NEGATIVE
G LAMBLIA AG STL QL IA: NEGATIVE
O+P STL CONC: NORMAL
SALM + SHIG STL CULT: NORMAL

## 2017-06-29 NOTE — PROGRESS NOTES
Please call patient -- all of her stool studies were negative for growth of infection. Have her symptoms improved?

## 2017-08-11 ENCOUNTER — TELEPHONE (OUTPATIENT)
Dept: OBGYN CLINIC | Age: 32
End: 2017-08-11

## 2017-09-07 ENCOUNTER — HOSPITAL ENCOUNTER (OUTPATIENT)
Dept: MRI IMAGING | Age: 32
Discharge: HOME | End: 2017-09-07
Attending: SURGERY

## 2017-09-07 DIAGNOSIS — Z80.3 FAMILY HISTORY OF BREAST CANCER: ICD-10-CM

## 2017-09-07 DIAGNOSIS — Z91.89 AT HIGH RISK FOR BREAST CANCER: ICD-10-CM

## 2018-05-14 ENCOUNTER — OFFICE VISIT (OUTPATIENT)
Dept: SURGERY | Age: 33
End: 2018-05-14

## 2018-05-14 VITALS
BODY MASS INDEX: 39.1 KG/M2 | HEIGHT: 69 IN | HEART RATE: 51 BPM | WEIGHT: 264 LBS | SYSTOLIC BLOOD PRESSURE: 128 MMHG | DIASTOLIC BLOOD PRESSURE: 80 MMHG

## 2018-05-14 DIAGNOSIS — Z91.89 AT HIGH RISK FOR BREAST CANCER: Primary | ICD-10-CM

## 2018-05-14 DIAGNOSIS — Z80.3 FAMILY HISTORY OF BREAST CANCER: ICD-10-CM

## 2018-05-14 NOTE — PATIENT INSTRUCTIONS
Breast Self-Exam: Care Instructions  Your Care Instructions    A breast self-exam is when you check your breasts for lumps or changes. This regular exam helps you learn how your breasts normally look and feel. Most breast problems or changes are not because of cancer. Breast self-exam is not a substitute for a mammogram. Having regular breast exams by your doctor and regular mammograms improve your chances of finding any problems with your breasts. Some women set a time each month to do a step-by-step breast self-exam. Other women like a less formal system. They might look at their breasts as they brush their teeth, or feel their breasts once in a while in the shower. If you notice a change in your breast, tell your doctor. Follow-up care is a key part of your treatment and safety. Be sure to make and go to all appointments, and call your doctor if you are having problems. It's also a good idea to know your test results and keep a list of the medicines you take. How do you do a breast self-exam?  · The best time to examine your breasts is usually one week after your menstrual period begins. Your breasts should not be tender then. If you do not have periods, you might do your exam on a day of the month that is easy to remember. · To examine your breasts:  ¨ Remove all your clothes above the waist and lie down. When you are lying down, your breast tissue spreads evenly over your chest wall, which makes it easier to feel all your breast tissue. ¨ Use the pads-not the fingertips-of the 3 middle fingers of your left hand to check your right breast. Move your fingers slowly in small coin-sized circles that overlap. ¨ Use three levels of pressure to feel of all your breast tissue. Use light pressure to feel the tissue close to the skin surface. Use medium pressure to feel a little deeper. Use firm pressure to feel your tissue close to your breastbone and ribs.  Use each pressure level to feel your breast tissue before moving on to the next spot. ¨ Check your entire breast, moving up and down as if following a strip from the collarbone to the bra line, and from the armpit to the ribs. Repeat until you have covered the entire breast.  ¨ Repeat this procedure for your left breast, using the pads of the 3 middle fingers of your right hand. · To examine your breasts while in the shower:  ¨ Place one arm over your head and lightly soap your breast on that side. ¨ Using the pads of your fingers, gently move your hand over your breast (in the strip pattern described above), feeling carefully for any lumps or changes. ¨ Repeat for the other breast.  · Have your doctor inspect anything you notice to see if you need further testing. Where can you learn more? Go to http://jared-leia.info/. Enter P148 in the search box to learn more about \"Breast Self-Exam: Care Instructions. \"  Current as of: May 12, 2017  Content Version: 11.4  © 2795-7014 [employer]. Care instructions adapted under license by [employer]it (which disclaims liability or warranty for this information). If you have questions about a medical condition or this instruction, always ask your healthcare professional. [place] [address] any warranty or liability for your use of this information. Breast Self-Exam: Care Instructions  Your Care Instructions    A breast self-exam is when you check your breasts for lumps or changes. This regular exam helps you learn how your breasts normally look and feel. Most breast problems or changes are not because of cancer. Breast self-exam is not a substitute for a mammogram. Having regular breast exams by your doctor and regular mammograms improve your chances of finding any problems with your breasts. Some women set a time each month to do a step-by-step breast self-exam. Other women like a less formal system.  They might look at their breasts as they brush their teeth, or feel their breasts once in a while in the shower. If you notice a change in your breast, tell your doctor. Follow-up care is a key part of your treatment and safety. Be sure to make and go to all appointments, and call your doctor if you are having problems. It's also a good idea to know your test results and keep a list of the medicines you take. How do you do a breast self-exam?  · The best time to examine your breasts is usually one week after your menstrual period begins. Your breasts should not be tender then. If you do not have periods, you might do your exam on a day of the month that is easy to remember. · To examine your breasts:  ¨ Remove all your clothes above the waist and lie down. When you are lying down, your breast tissue spreads evenly over your chest wall, which makes it easier to feel all your breast tissue. ¨ Use the pads-not the fingertips-of the 3 middle fingers of your left hand to check your right breast. Move your fingers slowly in small coin-sized circles that overlap. ¨ Use three levels of pressure to feel of all your breast tissue. Use light pressure to feel the tissue close to the skin surface. Use medium pressure to feel a little deeper. Use firm pressure to feel your tissue close to your breastbone and ribs. Use each pressure level to feel your breast tissue before moving on to the next spot. ¨ Check your entire breast, moving up and down as if following a strip from the collarbone to the bra line, and from the armpit to the ribs. Repeat until you have covered the entire breast.  ¨ Repeat this procedure for your left breast, using the pads of the 3 middle fingers of your right hand. · To examine your breasts while in the shower:  ¨ Place one arm over your head and lightly soap your breast on that side. ¨ Using the pads of your fingers, gently move your hand over your breast (in the strip pattern described above), feeling carefully for any lumps or changes.   ¨ Repeat for the other breast.  · Have your doctor inspect anything you notice to see if you need further testing. Where can you learn more? Go to http://jared-leia.info/. Enter P148 in the search box to learn more about \"Breast Self-Exam: Care Instructions. \"  Current as of: May 12, 2017  Content Version: 11.4  © 0488-3897 [employer]. Care instructions adapted under license by [employer] (which disclaims liability or warranty for this information). If you have questions about a medical condition or this instruction, always ask your healthcare professional. [place] [address] any warranty or liability for your use of this information.

## 2018-05-14 NOTE — PROGRESS NOTES
HISTORY OF PRESENT ILLNESS  [place] is a 29 y.o. female. HPI  ESTABLISHED patient here for annual follow up high risk for breast cancer. Patient is doing well. Has some breast tenderness. Denies any breast lumps, skin changes, or nipple discharge/retraction. Patient is trying to get pregnant. Barbaraer-Anaik risk 24.3 %    Family History: mother diagnosed with breast cancer age 35(Mallory Vazquez ), maternal cousin, maternal aunt also with breast cancer all post menopausal. Her mother does not carry the BRCA gene.       ROS    Physical Exam    ASSESSMENT and PLAN  {ASSESSMENT/PLAN:98148}

## 2018-05-15 ENCOUNTER — TELEPHONE (OUTPATIENT)
Dept: INTERNAL MEDICINE CLINIC | Age: 33
End: 2018-05-15

## 2018-05-15 ENCOUNTER — OFFICE VISIT (OUTPATIENT)
Dept: INTERNAL MEDICINE CLINIC | Age: 33
End: 2018-05-15

## 2018-05-15 VITALS
OXYGEN SATURATION: 96 % | HEART RATE: 64 BPM | TEMPERATURE: 98.9 F | RESPIRATION RATE: 18 BRPM | SYSTOLIC BLOOD PRESSURE: 107 MMHG | WEIGHT: 268.38 LBS | BODY MASS INDEX: 39.75 KG/M2 | HEIGHT: 69 IN | DIASTOLIC BLOOD PRESSURE: 73 MMHG

## 2018-05-15 DIAGNOSIS — S93.402A SPRAIN OF LEFT ANKLE, UNSPECIFIED LIGAMENT, INITIAL ENCOUNTER: Primary | ICD-10-CM

## 2018-05-15 RX ORDER — NORETHINDRONE AND ETHINYL ESTRADIOL 1 MG-35MCG
KIT ORAL
Refills: 4 | COMMUNITY
Start: 2018-02-16 | End: 2018-08-24

## 2018-05-15 NOTE — PATIENT INSTRUCTIONS
Ankle Sprain: Care Instructions  Your Care Instructions    An ankle sprain can happen when you twist your ankle. The ligaments that support the ankle can get stretched and torn. Often the ankle is swollen and painful. Ankle sprains may take from several weeks to several months to heal. Usually, the more pain and swelling you have, the more severe your ankle sprain is and the longer it will take to heal. You can heal faster and regain strength in your ankle with good home treatment. It is very important to give your ankle time to heal completely, so that you do not easily hurt your ankle again. Follow-up care is a key part of your treatment and safety. Be sure to make and go to all appointments, and call your doctor if you are having problems. It's also a good idea to know your test results and keep a list of the medicines you take. How can you care for yourself at home? · Prop up your foot on pillows as much as possible for the next 3 days. Try to keep your ankle above the level of your heart. This will help reduce the swelling. · Follow your doctor's directions for wearing a splint or elastic bandage. Wrapping the ankle may help reduce or prevent swelling. · Your doctor may give you a splint, a brace, an air stirrup, or another form of ankle support to protect your ankle until it is healed. Wear it as directed while your ankle is healing. Do not remove it unless your doctor tells you to. After your ankle has healed, ask your doctor whether you should wear the brace when you exercise. · Put ice or cold packs on your injured ankle for 10 to 20 minutes at a time. Try to do this every 1 to 2 hours for the next 3 days (when you are awake) or until the swelling goes down. Put a thin cloth between the ice and your skin. · You may need to use crutches until you can walk without pain. If you do use crutches, try to bear some weight on your injured ankle if you can do so without pain.  This helps the ankle heal.  · Take pain medicines exactly as directed. ¨ If the doctor gave you a prescription medicine for pain, take it as prescribed. ¨ If you are not taking a prescription pain medicine, ask your doctor if you can take an over-the-counter medicine. · If you have been given ankle exercises to do at home, do them exactly as instructed. These can promote healing and help prevent lasting weakness. When should you call for help? Call your doctor now or seek immediate medical care if:  ? · Your pain is getting worse. ? · Your swelling is getting worse. ? · Your splint feels too tight or you are unable to loosen it. ? Watch closely for changes in your health, and be sure to contact your doctor if:  ? · You are not getting better after 1 week. Where can you learn more? Go to http://jared-leia.info/. Enter Y113 in the search box to learn more about \"Ankle Sprain: Care Instructions. \"  Current as of: March 21, 2017  Content Version: 11.4  © 2934-6392 [employer]wise[employer]. Care instructions adapted under license by DealsNear[employer] (which disclaims liability or warranty for this information). If you have questions about a medical condition or this instruction, always ask your healthcare professional. [address] any warranty or liability for your use of this information.

## 2018-05-15 NOTE — TELEPHONE ENCOUNTER
I called pt no answer, LM on personal VM that I was calling to get more information regarding her appt/sx today.

## 2018-05-15 NOTE — COMMUNICATION BODY
HISTORY OF PRESENT ILLNESS  [place] is a 29 y.o. female. HPI  ESTABLISHED patient here for annual follow up high risk for breast cancer. Patient is doing well. Has some breast tenderness. Denies any breast lumps, skin changes, or nipple discharge/retraction. Patient is trying to get pregnant.     Tyrer-Anaik risk 24.3 %     Family History: mother diagnosed with breast cancer age 28 (Diane Barnett), maternal cousin, maternal aunt also with breast cancer all post menopausal. Her mother does not carry the BRCA gene. Past Medical History:   Diagnosis Date    Dysmenorrhea     Dr. Esa Laurent Family history of breast cancer     mother age 28. seeing Dr. Bill Lawrence    HPV vaccine counseling     gardasil series complete    Patellofemoral arthralgia of both knees     hx of rowing, catching       No past surgical history on file. Social History     Social History    Marital status:      Spouse name: Magui Merchant Number of children: 0    Years of education: N/A     Occupational History     [employer]     Social History Main Topics    Smoking status: Never Smoker    Smokeless tobacco: Never Used    Alcohol use Yes      Comment: 2-3 drinks per week    Drug use: No    Sexual activity: Yes     Partners: Male     Birth control/ protection: Pill     Other Topics Concern    Not on file     Social History Narrative       Current Outpatient Prescriptions on File Prior to Visit   Medication Sig Dispense Refill    norethindrone-ethinyl estradiol ([facility] 91 , 28,) 1-35 mg-mcg tab Take  by mouth. No current facility-administered medications on file prior to visit.         Allergies   Allergen Reactions    Augmentin [Amoxicillin-Pot Clavulanate] Hives    Bactrim [Sulfamethoprim] Hives    Penicillins Unable to Obtain       OB History      Para Term  AB Living    0 0 0 0 0 0    SAB TAB Ectopic Molar Multiple Live Births    0 0 0  0         Obstetric Comments    Menarche:  17. LMP: 08/26/2014. # of Children:  n/a. Age at Delivery of First Child:  n/a. Hysterectomy/oophorectomy:  NO/NO. Breast Bx no. Hx of Breast Feeding:  n/a. BCP:  yes. Hormone therapy:  no.        ROS    Physical Exam   Cardiovascular: Normal rate and normal heart sounds. Pulmonary/Chest: Breath sounds normal. Right breast exhibits no inverted nipple, no mass, no nipple discharge, no skin change and no tenderness. Left breast exhibits no inverted nipple, no mass, no nipple discharge, no skin change and no tenderness. Breasts are symmetrical.   Lymphadenopathy:        Right cervical: No superficial cervical, no deep cervical and no posterior cervical adenopathy present. Left cervical: No superficial cervical, no deep cervical and no posterior cervical adenopathy present. Right axillary: No pectoral and no lateral adenopathy present. Left axillary: No pectoral and no lateral adenopathy present. ASSESSMENT and PLAN    ICD-10-CM ICD-9-CM    1. At high risk for breast cancer Z91.89 V49.89    2. Family history of breast cancer Z80.3 V16.3       Pt with significant family hx of BC and doing well. Her lifetime risk for breast cancer is at 24.3% (Tyrer-Cusik). Exam today normal. Discussed that pt has discontinued birth control pills. Will re-run Gail Caraballo blood test. F/U in 1 year. This plan was reviewed with the patient and patient agrees. All questions were answered.     Written by David Lerma, as dictated by Dr. Lilo Hanson MD.

## 2018-05-15 NOTE — TELEPHONE ENCOUNTER
Appt scheduled with Dr. Zachary Colbert (he specializes in [address] [place] [address] as well) for Thursday 05/17/18 [facility] location. Pt needs to arrive at 815am for an 830am appt. Pt was informed of the above information and verbalized understanding. Please obtain a referral if needed.

## 2018-05-15 NOTE — PROGRESS NOTES
HISTORY OF PRESENT ILLNESS  [place] is a 29 y.o. female. HPI  Presents with complaints of persistent pain and weakness to left ankle for the past month. Reports she rolled her left ankle 1 month ago while walking on uneven pavement downtown; stumbled but did not fall and was able to put weight on left foot but developed significant swelling and pain several hours later; she treated with elevation, ice and compression bandage. Acute swelling and pain subsided after 1 week but she still notes persistent swelling along lateral left ankle and feels like ankle will not support her weight; she feels like her range of motion is compromised and she cannot walk for any length of time due to pain. She is unable to exercise due to symptoms. Reports she has sprained both ankles several times in past.    Review of Systems   Constitutional: Negative for chills and fever. Respiratory: Negative for cough. Cardiovascular: Negative for chest pain and palpitations. Gastrointestinal: Negative for nausea and vomiting. Musculoskeletal: Positive for joint pain (left ankle). /73 (BP 1 Location: Left arm, BP Patient Position: Sitting)  Pulse 64  Temp 98.9 °F (37.2 °C) (Oral)   Resp 18  Ht 5' 8.75\" (1.746 m)  Wt 268 lb 6 oz (121.7 kg)  LMP 04/16/2018  SpO2 96%  BMI 39.92 kg/m2  Physical Exam   Constitutional: She is oriented to person, place, and time. She appears well-developed and well-nourished. HENT:   Head: Normocephalic and atraumatic. Neck: Normal range of motion. Neck supple. Cardiovascular: Normal rate and regular rhythm. Pulmonary/Chest: Effort normal and breath sounds normal.   Musculoskeletal:        Left ankle: She exhibits decreased range of motion and swelling. Tenderness. Lateral malleolus and head of 5th metatarsal tenderness found. Achilles tendon exhibits pain.         Feet:    Point tenderness over left lateral malleolus and anterior left ankle   Neurological: She is alert and oriented to person, place, and time. Skin: Skin is warm and dry. No rash noted. Psychiatric: She has a normal mood and affect. Her behavior is normal.   Nursing note and vitals reviewed. ASSESSMENT and PLAN  Diagnoses and all orders for this visit:    1.  Sprain of left ankle, unspecified ligament, initial encounter -- concern about persistence of symptoms especially in ankle that has been sprained several times prior to current symptoms; question whether she has torn ligament; will have her see Orthopedic specialist for further evaluation.  -     REFERRAL TO ORTHOPEDIC SURGERY      reviewed diet, exercise and weight control  reviewed medications and side effects in detail

## 2018-05-15 NOTE — MR AVS SNAPSHOT
[address] [facility] 
 
 
 [address] [place] [address] [facility] 
[phone] Patient: Lisa Beach MRN: [id number] PJY:[id number] Visit Information Date & Time Provider Department Dept. Phone Encounter #  
 5/15/2018  1:40 PM Jorje Nichols NP [facility] [id number] Your Appointments 8/14/2018  9:00 AM  
Complete Physical with Reese Chinchilla MD  
[facility] [address]) Appt Note: Complete Physical Exam  
 [address] [facility] [address]  
  
   
 [address] [facility]  
  
    
 5/20/2019  8:30 AM  
Follow Up with Jose Antonio Hunter MD  
[address]) Appt Note: annual follow up/cp?/St  
 [address] [facility] [address]  
  
   
 [address] Upcoming Health Maintenance Date Due DTaP/Tdap/Td series (2 - Tdap) 7/23/2008 Influenza Age 5 to Adult 8/1/2018 PAP AKA CERVICAL CYTOLOGY 12/17/2018 Allergies as of 5/15/2018  Review Complete On: 5/15/2018 By: Jorje Nichols NP Severity Noted Reaction Type Reactions Augmentin [Amoxicillin-pot Clavulanate]  09/03/2014    Hives Bactrim [Sulfamethoprim]  05/08/2017    Hives Penicillins  09/03/2014    Unable to Obtain Current Immunizations  Reviewed on 11/3/2014 Name Date Hep B Vaccine 1/1/2005 Td 1/1/2005 Not reviewed this visit You Were Diagnosed With   
  
 Codes Comments Sprain of left ankle, unspecified ligament, initial encounter    -  Primary ICD-10-CM: F15.635K ICD-9-CM: 845.00 Vitals BP Pulse Temp Resp Height(growth percentile) Weight(growth percentile) 107/73 (BP 1 Location: Left arm, BP Patient Position: Sitting) 64 98.9 °F (37.2 °C) (Oral) 18 5' 8.75\" (1.746 m) 268 lb 6 oz (121.7 kg) LMP SpO2 BMI OB Status Smoking Status 04/16/2018 96% 39.92 kg/m2 Having regular periods Never Smoker Vitals History BMI and BSA Data Body Mass Index Body Surface Area  
 39.92 kg/m 2 2.43 m 2 Preferred Pharmacy Pharmacy Name Phone [id number] [address] , [id number] [place] [address]  [phone] Your Updated Medication List  
  
   
This list is accurate as of 5/15/18  2:21 PM.  Always use your most recent med list.  
  
  
  
  
 Megan Montalvo 1/35 (28) 1-35 mg-mcg Tab Generic drug:  norethindrone-ethinyl estradiol TAKE 1 TABLET BY MOUTH EVERY DAY We Performed the Following REFERRAL TO ORTHOPEDIC SURGERY [REF62 Custom] Referral Information Referral ID Referred By Referred To  
  
 [id number] Iona Nayak MD   
   [address] Phone: [phone] Fax: [phone] Visits Status Start Date End Date 1 New Request 5/15/18 5/15/19 If your referral has a status of pending review or denied, additional information will be sent to support the outcome of this decision. Patient Instructions Ankle Sprain: Care Instructions Your Care Instructions An ankle sprain can happen when you twist your ankle. The ligaments that support the ankle can get stretched and torn. Often the ankle is swollen and painful. Ankle sprains may take from several weeks to several months to heal. Usually, the more pain and swelling you have, the more severe your ankle sprain is and the longer it will take to heal. You can heal faster and regain strength in your ankle with good home treatment. It is very important to give your ankle time to heal completely, so that you do not easily hurt your ankle again. Follow-up care is a key part of your treatment and safety.  Be sure to make and go to all appointments, and call your doctor if you are having problems. It's also a good idea to know your test results and keep a list of the medicines you take. How can you care for yourself at home? · Prop up your foot on pillows as much as possible for the next 3 days. Try to keep your ankle above the level of your heart. This will help reduce the swelling. · Follow your doctor's directions for wearing a splint or elastic bandage. Wrapping the ankle may help reduce or prevent swelling. · Your doctor may give you a splint, a brace, an air stirrup, or another form of ankle support to protect your ankle until it is healed. Wear it as directed while your ankle is healing. Do not remove it unless your doctor tells you to. After your ankle has healed, ask your doctor whether you should wear the brace when you exercise. · Put ice or cold packs on your injured ankle for 10 to 20 minutes at a time. Try to do this every 1 to 2 hours for the next 3 days (when you are awake) or until the swelling goes down. Put a thin cloth between the ice and your skin. · You may need to use crutches until you can walk without pain. If you do use crutches, try to bear some weight on your injured ankle if you can do so without pain. This helps the ankle heal. 
· Take pain medicines exactly as directed. ¨ If the doctor gave you a prescription medicine for pain, take it as prescribed. ¨ If you are not taking a prescription pain medicine, ask your doctor if you can take an over-the-counter medicine. · If you have been given ankle exercises to do at home, do them exactly as instructed. These can promote healing and help prevent lasting weakness. When should you call for help? Call your doctor now or seek immediate medical care if: 
? · Your pain is getting worse. ? · Your swelling is getting worse. ? · Your splint feels too tight or you are unable to loosen it. ? Watch closely for changes in your health, and be sure to contact your doctor if: 
? · You are not getting better after 1 week. Where can you learn more? Go to http://jared-leia.info/. Enter J950 in the search box to learn more about \"Ankle Sprain: Care Instructions. \" Current as of: March 21, 2017 Content Version: 11.4 © 5414-0582 Healthwise, Incorporated. Care instructions adapted under license by [employer] (which disclaims liability or warranty for this information). If you have questions about a medical condition or this instruction, always ask your healthcare professional. [address] any warranty or liability for your use of this information. Introducing [facility]! Alexa Pires introduces TEEspy patient portal. Now you can access parts of your medical record, email your doctor's office, and request medication refills online. 1. In your internet browser, go to [email or web address] 2. Click on the First Time User? Click Here link in the Sign In box. You will see the New Member Sign Up page. 3. Enter your TEEspy Access Code exactly as it appears below. You will not need to use this code after youve completed the sign-up process. If you do not sign up before the expiration date, you must request a new code. · TEEspy Access Code: [email or web address] Expires: 8/12/2018  8:37 AM 
 
4. Enter the last four digits of your Social Security Number (xxxx) and Date of Birth (mm/dd/yyyy) as indicated and click Submit. You will be taken to the next sign-up page. 5. Create a TEEspy ID. This will be your TEEspy login ID and cannot be changed, so think of one that is secure and easy to remember. 6. Create a TEEspy password. You can change your password at any time. 7. Enter your Password Reset Question and Answer. This can be used at a later time if you forget your password. 8. Enter your e-mail address. You will receive e-mail notification when new information is available in [address] 9. Click Sign Up. You can now view and download portions of your medical record. 10. Click the Download Summary menu link to download a portable copy of your medical information. If you have questions, please visit the Frequently Asked Questions section of the [employer]Â® website. Remember, [employer]Â® is NOT to be used for urgent needs. For medical emergencies, dial 911. Now available from your iPhone and Android! Please provide this summary of care documentation to your next provider. Your primary care clinician is listed as Lazarus Carmona. If you have any questions after today's visit, please call [phone].

## 2018-05-23 ENCOUNTER — DOCUMENTATION ONLY (OUTPATIENT)
Dept: SURGERY | Age: 33
End: 2018-05-23

## 2018-05-23 NOTE — PROGRESS NOTES
Mikel Dandy from [facility] called today to get BIRADS for patient. I told him the patient had not had a mammogram so she had no BIRADS.

## 2018-05-30 ENCOUNTER — TELEPHONE (OUTPATIENT)
Dept: SURGERY | Age: 33
End: 2018-05-30

## 2018-05-30 NOTE — TELEPHONE ENCOUNTER
Called patient to give her results of her Videssa testing. She was not available but I got a voicemail with her name on it, so I L/M that her Noy Jin was low risk, the same as last year. I asked her to call back if she had any further questions.

## 2018-08-24 ENCOUNTER — OFFICE VISIT (OUTPATIENT)
Dept: OBGYN CLINIC | Age: 33
End: 2018-08-24

## 2018-08-24 VITALS
SYSTOLIC BLOOD PRESSURE: 122 MMHG | BODY MASS INDEX: 40.92 KG/M2 | DIASTOLIC BLOOD PRESSURE: 78 MMHG | HEIGHT: 68 IN | WEIGHT: 270 LBS

## 2018-08-24 DIAGNOSIS — N92.6 IRREGULAR MENSTRUAL CYCLE: ICD-10-CM

## 2018-08-24 DIAGNOSIS — Z11.51 SCREENING FOR HPV (HUMAN PAPILLOMAVIRUS): ICD-10-CM

## 2018-08-24 DIAGNOSIS — Z01.419 ENCOUNTER FOR GYNECOLOGICAL EXAMINATION (GENERAL) (ROUTINE) WITHOUT ABNORMAL FINDINGS: Primary | ICD-10-CM

## 2018-08-24 PROBLEM — E66.01 OBESITY, MORBID (HCC): Status: ACTIVE | Noted: 2018-08-24

## 2018-08-24 NOTE — PATIENT INSTRUCTIONS

## 2018-08-24 NOTE — PROGRESS NOTES
Marcelino Solorzano is a [de-identified] P5,  34 y.o. female [facility] whose Patient's last menstrual period was 07/27/2018. who presents for her annual checkup. She is having no significant problems. With regard to the Gardisil vaccine, she is older than the FDA approved age to receive it. Menstrual status:    Her periods are moderate, with painful cramping in flow. She is using three to five pads or tampons per day, usually regular and last 35-37 days. . Long hx of irregular cycles    She denies dysmenorrhea. She reports no premenstrual symptoms. Contraception:    The current method of family planning is none and She declines contraception and counseling. Pt is trying to concieve. Sexual history:    She  reports that she currently engages in sexual activity and has had male partners. She reports using the following method of birth control/protection: Pill. Medical conditions:    Since her last annual GYN exam about one year ago 4/20/17, she has not the following changes in her health history: none. Pap and Mammogram History:    Her most recent Pap smear was normal 12/17/15 obtained 3 year(s) ago. The patient has never had a mammogram.    The patient does have a family history of breast cancer. Past Medical History:   Diagnosis Date    Dysmenorrhea     Dr. Amauri Alanis Family history of breast cancer     mother age 28. seeing Dr. Harman Connolly    HPV vaccine counseling     gardasil series complete    Patellofemoral arthralgia of both knees     hx of rowing, catching     No past surgical history on file. Current Outpatient Prescriptions   Medication Sig Dispense Refill    PNV GO.69/[id number] fum/folic ac (PRENATAL PO) Take  by mouth.  ALYACEN 1/35, 28, 1-35 mg-mcg tab TAKE 1 TABLET BY MOUTH EVERY DAY  4     Allergies: Augmentin [amoxicillin-pot clavulanate]; Bactrim [sulfamethoprim];  Penicillins; and Sulfamethoxazole-trimethoprim   Social History     Social History    Marital status:      Spouse name: Marcia Orozco Number of children: 0    Years of education: N/A     Occupational History     [employer]     Social History Main Topics    Smoking status: Never Smoker    Smokeless tobacco: Never Used    Alcohol use Yes      Comment: 2-3 drinks per week    Drug use: No    Sexual activity: Yes     Partners: Male     Birth control/ protection: Pill     Other Topics Concern    Not on file     Social History Narrative     Tobacco History:  reports that she has never smoked. She has never used smokeless tobacco.  Alcohol Abuse:  reports that she drinks alcohol. Drug Abuse:  reports that she does not use illicit drugs.     Patient Active Problem List   Diagnosis Code    Family history of breast cancer Z80.3    Dysmenorrhea N94.6    At high risk for breast cancer Z91.89       Review of Systems - History obtained from the patient  Constitutional: negative for weight loss, fever, night sweats  HEENT: negative for hearing loss, earache, congestion, snoring, sorethroat  CV: negative for chest pain, palpitations, edema  Resp: negative for cough, shortness of breath, wheezing  GI: negative for change in bowel habits, abdominal pain, black or bloody stools  : negative for frequency, dysuria, hematuria, vaginal discharge  MSK: negative for back pain, joint pain, muscle pain  Breast: negative for breast lumps, nipple discharge, galactorrhea  Skin :negative for itching, rash, hives  Neuro: negative for dizziness, headache, confusion, weakness  Psych: negative for anxiety, depression, change in mood  Heme/lymph: negative for bleeding, bruising, pallor    Physical Exam    Visit Vitals    /78    Ht 5' 8\" (1.727 m)    Wt 270 lb (122.5 kg)    LMP 07/27/2018    BMI 41.05 kg/m2       Constitutional  · Appearance: well-nourished, well developed, alert, in no acute distress    HENT  · Head and Face: appears normal    Neck  · Inspection/Palpation: normal appearance, no masses or tenderness  · Lymph Nodes: no lymphadenopathy present  · Thyroid: gland size normal, nontender, no nodules or masses present on palpation    Chest  · Respiratory Effort: breathing normal  · Auscultation: normal breath sounds    Cardiovascular  · Heart:  · Auscultation: regular rate and rhythm without murmur    Breasts  · Inspection of Breasts: breasts symmetrical, no skin changes, no discharge present, nipple appearance normal, no skin retraction present  · Palpation of Breasts and Axillae: no masses present on palpation, no breast tenderness  · Axillary Lymph Nodes: no lymphadenopathy present    Gastrointestinal  · Abdominal Examination: abdomen non-tender to palpation, normal bowel sounds, no masses present  · Liver and spleen: no hepatomegaly present, spleen not palpable  · Hernias: no hernias identified    Genitourinary  · External Genitalia: normal appearance for age, no discharge present, no tenderness present, no inflammatory lesions present, no masses present, no atrophy present  · Vagina: normal vaginal vault without central or paravaginal defects, no discharge present, no inflammatory lesions present, no masses present  · Bladder: non-tender to palpation  · Urethra: appears normal  · Cervix: normal   · Uterus: normal size, shape and consistency  · Adnexa: no adnexal tenderness present, no adnexal masses present  · Perineum: perineum within normal limits, no evidence of trauma, no rashes or skin lesions present  · Anus: anus within normal limits, no hemorrhoids present  · Inguinal Lymph Nodes: no lymphadenopathy present    Skin  · General Inspection: no rash, no lesions identified    Neurologic/Psychiatric  · Mental Status:  · Orientation: grossly oriented to person, place and time  · Mood and Affect: mood normal, affect appropriate    . Assessment:  Routine gynecologic examination  Her current medical status is satisfactory with no evidence of significant gynecologic issues.   Mother with breast cancer at 35  Irregular cycles - desires conception  Plan:  Counseled re: diet, exercise, healthy lifestyle  Return for yearly wellness visits  Pt counseled regarding co-testing for high risk HPV with pap  Prolactin, TSH, prog/HCG today and A1C  Disc femara if needed

## 2018-08-25 LAB
EST. AVERAGE GLUCOSE BLD GHB EST-MCNC: 105 MG/DL
HBA1C MFR BLD: 5.3 % (ref 4.8–5.6)
HCG INTACT+B SERPL-ACNC: <1 MIU/ML
PROGEST SERPL-MCNC: 1.2 NG/ML
PROLACTIN SERPL-MCNC: 8.5 NG/ML (ref 4.8–23.3)
TSH SERPL DL<=0.005 MIU/L-ACNC: 1.56 UIU/ML (ref 0.45–4.5)

## 2018-08-27 LAB
CYTOLOGIST CVX/VAG CYTO: NORMAL
CYTOLOGY CVX/VAG DOC THIN PREP: NORMAL
DX ICD CODE: NORMAL
HPV I/H RISK 1 DNA CVX QL PROBE+SIG AMP: NEGATIVE
Lab: NORMAL
OTHER STN SPEC: NORMAL
PATH REPORT.FINAL DX SPEC: NORMAL
STAT OF ADQ CVX/VAG CYTO-IMP: NORMAL

## 2018-08-28 RX ORDER — LETROZOLE 2.5 MG/1
5 TABLET, FILM COATED ORAL DAILY
Qty: 10 TAB | Refills: 0 | Status: SHIPPED | OUTPATIENT
Start: 2018-08-28 | End: 2018-10-17 | Stop reason: SDUPTHER

## 2018-08-28 RX ORDER — MEDROXYPROGESTERONE ACETATE 10 MG/1
10 TABLET ORAL DAILY
Qty: 10 TAB | Refills: 0 | Status: SHIPPED | OUTPATIENT
Start: 2018-08-28 | End: 2018-12-19 | Stop reason: ALTCHOICE

## 2018-09-12 ENCOUNTER — OFFICE VISIT (OUTPATIENT)
Dept: INTERNAL MEDICINE CLINIC | Age: 33
End: 2018-09-12

## 2018-09-12 VITALS
OXYGEN SATURATION: 96 % | DIASTOLIC BLOOD PRESSURE: 82 MMHG | BODY MASS INDEX: 41.52 KG/M2 | TEMPERATURE: 98.1 F | HEIGHT: 68 IN | SYSTOLIC BLOOD PRESSURE: 130 MMHG | RESPIRATION RATE: 18 BRPM | WEIGHT: 274 LBS | HEART RATE: 74 BPM

## 2018-09-12 DIAGNOSIS — E66.01 OBESITY, MORBID (HCC): ICD-10-CM

## 2018-09-12 DIAGNOSIS — Z23 ENCOUNTER FOR IMMUNIZATION: ICD-10-CM

## 2018-09-12 DIAGNOSIS — H61.23 IMPACTED CERUMEN, BILATERAL: ICD-10-CM

## 2018-09-12 DIAGNOSIS — Z00.00 PREVENTATIVE HEALTH CARE: Primary | ICD-10-CM

## 2018-09-12 NOTE — MR AVS SNAPSHOT
Rosa Abraham 
 
 
 [address] [facility] [address] [facility] 
[phone] Patient: Veda Cullen MRN: [id number] QKE:[id number] Visit Information Date & Time Provider Department Dept. Phone Encounter #  
 9/12/2018 11:00 AM Travis Gutierrez MD [facility] [address] [id number] Your Appointments 5/20/2019  8:30 AM  
Follow Up with Gladys Ervin MD  
[address] [place] [address]) Appt Note: annual follow up/cp?/St  
 [address]  
  
   
 [place] [id number] [facility] Upcoming Health Maintenance Date Due DTaP/Tdap/Td series (2 - Tdap) 7/23/2008 Influenza Age 5 to Adult 8/1/2018 PAP AKA CERVICAL CYTOLOGY 8/24/2023 Allergies as of 9/12/2018  Review Complete On: 9/12/2018 By: Travis Gutierrez MD  
  
 Severity Noted Reaction Type Reactions Augmentin [Amoxicillin-pot Clavulanate]  09/03/2014    Hives Bactrim [Sulfamethoprim]  05/08/2017    Hives Penicillins  09/03/2014    Unable to Obtain  
 Sulfamethoxazole-trimethoprim  05/23/2018    Hives Current Immunizations  Reviewed on 9/12/2018 Name Date Hep B Vaccine 1/1/2005 Influenza Vaccine (Quad) PF  Incomplete Td 1/1/2005 Reviewed by Travis Gutierrez MD on 9/12/2018 at 12:03 PM  
 Reviewed by Travis Gutierrez MD on 9/12/2018 at 12:03 PM  
You Were Diagnosed With   
  
 Codes Comments Preventative health care    -  Primary ICD-10-CM: Z00.00 ICD-9-CM: V70.0 Obesity, morbid ([facility] [address]     ICD-10-CM: E66.01 
ICD-9-CM: 278.01 Encounter for immunization     ICD-10-CM: E21 ICD-9-CM: V03.89 Impacted cerumen, bilateral     ICD-10-CM: H61.23 
ICD-9-CM: 380.4 Vitals BP Pulse Temp Resp Height(growth percentile) Weight(growth percentile)  130/82 (BP 1 Location: Left arm, BP Patient Position: Sitting) 74 98.1 °F (36.7 °C) (Oral) 18 5' 8\" (1.727 m) 274 lb (124.3 kg) LMP SpO2 BMI OB Status Smoking Status 08/23/2018 96% 41.66 kg/m2 Having regular periods Never Smoker Vitals History BMI and BSA Data Body Mass Index Body Surface Area  
 41.66 kg/m 2 2.44 m 2 Preferred Pharmacy Pharmacy Name Phone [phone] , [id number] [place] [address]  [phone] Your Updated Medication List  
  
   
This list is accurate as of 9/12/18 12:10 PM.  Always use your most recent med list.  
  
  
  
  
 medroxyPROGESTERone 10 mg tablet Commonly known as:  PROVERA Take 1 Tab by mouth daily. PRENATAL PO Take  by mouth. We Performed the Following INFLUENZA VIRUS VAC QUAD,SPLIT,PRESV FREE SYRINGE IM [id number] CPT(R)] LIPID PANEL [52359 CPT(R)] METABOLIC PANEL, COMPREHENSIVE [55689 CPT(R)] DE IMMUNIZ ADMIN,1 SINGLE/COMB VAC/TOXOID [id number] CPT(R)] Introducing [facility]! Dear [place]: 
Thank you for requesting a [employer] account. Our records indicate that you already have an active [employer] account. You can access your account anytime at [email or web address] [email or web address] Did you know that you can access your hospital and ER discharge instructions at any time in [employer]? You can also review all of your test results from your hospital stay or ER visit. Additional Information If you have questions, please visit the Frequently Asked Questions section of the [employer] website at [email or web address] Remember, [employer] is NOT to be used for urgent needs. For medical emergencies, dial 911. Now available from your iPhone and Android! Please provide this summary of care documentation to your next provider. Your primary care clinician is listed as Jenny Gómez. If you have any questions after today's visit, please call [phone].

## 2018-09-13 NOTE — PROGRESS NOTES
[place] is a 34 y.o. female Presenting for her annual checkup and follow-up Taking provera per GYN to try to stimulate cycle and pregnancy. She feels emotional since starting meds She is frustrated by weight gain Wt Readings from Last 3 Encounters:  
09/12/18 274 lb (124.3 kg) 08/24/18 270 lb (122.5 kg) 05/15/18 268 lb 6 oz (121.7 kg) Last breast exam: 8/24/18 per GYN Last PAP/pelvic: per gyn Last colonoscopy: none Last DEXA:  
Health Maintenance Topic Date Due  
 DTaP/Tdap/Td series (2 - Tdap) 07/23/2008  Influenza Age 5 to Adult  08/01/2018  PAP AKA CERVICAL CYTOLOGY  08/24/2023 Exercise: moderately active Diet: generally follows a low fat low cholesterol diet Vaccinations reviewed Immunization History Administered Date(s) Administered  Hep B Vaccine 01/01/2005  Influenza Vaccine (Quad) PF 09/12/2018  Td 01/01/2005 Allergies: Augmentin [amoxicillin-pot clavulanate]; Bactrim [sulfamethoprim]; Penicillins; and Sulfamethoxazole-trimethoprim Current Outpatient Prescriptions Medication Sig  
 medroxyPROGESTERone (PROVERA) 10 mg tablet Take 1 Tab by mouth daily.  PNV [id number].69/[id number] fum/folic ac (PRENATAL PO) Take  by mouth. No current facility-administered medications for this visit. has a past medical history of Dysmenorrhea; Family history of breast cancer; HPV vaccine counseling; Knee pain, chronic; and Patellofemoral arthralgia of both knees. No past surgical history on file. Social History Social History  Marital status:  Spouse name: Eneida Hicks  Number of children: 0  
 Years of education: N/A Occupational History  [employer]   Self Employed Social History Main Topics  Smoking status: Never Smoker  Smokeless tobacco: Never Used  Alcohol use Yes Comment: 2-3 drinks per week  Drug use: No  
 Sexual activity: Yes  
  Partners: Male Birth control/ protection: Pill Other Topics Concern  Not on file Social History Narrative Family History Problem Relation Age of Onset  Hypertension Mother  Breast Cancer Mother 28  High Cholesterol Mother  Thyroid Disease Mother   
  nodule  Stroke Paternal Grandfather Review of Systems - History obtained from the patient General ROS: negative for - night sweats, weight gain or weight loss Cardiovascular ROS: no chest pain, dyspnea on exertion, edema GYN ROS: normal menses, no abnormal bleeding, pelvic pain or discharge, no breast pain or new or enlarging lumps on self exam. 
 
Physical exam 
Blood pressure 130/82, pulse 74, temperature 98.1 °F (36.7 °C), temperature source Oral, resp. rate 18, height 5' 8\" (1.727 m), weight 274 lb (124.3 kg), last menstrual period 08/23/2018, SpO2 96 %. Wt Readings from Last 3 Encounters:  
09/12/18 274 lb (124.3 kg) 08/24/18 270 lb (122.5 kg) 05/15/18 268 lb 6 oz (121.7 kg) she appears well, alert and oriented x 3, pleasant and cooperative. Vitals as noted. No rashes or significant lesions. Neck supple and free of adenopathy, or masses. No thyromegaly or carotid bruits. Cranial nerves normal. Lungs are clear to auscultation. Heart sounds are normal with no murmurs, clicks, gallops or rubs. Abdomen is soft, non- tender, with no masses or organomegaly. Extremities, peripheral pulses and reflexes are normal.  . BREAST EXAM: not examined PELVIC EXAM: examination not indicated Diagnoses and all orders for this visit: 1. Preventative health care -     METABOLIC PANEL, COMPREHENSIVE 
-     LIPID PANEL 
 
2. Obesity, morbid ([address] Sig weight gain The patient is asked to make an attempt to improve diet and exercise patterns to aid in medical management of this problem. Use Weight Watchers. Med therapy to be considered if not planning pregnancy 3. Encounter for immunization -     Influenza virus vaccine (QUADRIVALENT PRES FREE SYRINGE) IM (38089) -     NC IMMUNIZ ADMIN,1 SINGLE/COMB VAC/TOXOID 4. Impacted cerumen, bilateral 
Hard cerumen. Soak with debrox and Return to clinic for further evaluation if new symptoms develop or if current symptoms worsen or fail to resolve. The patient is asked to make an attempt to improve diet and exercise patterns Return for yearly wellness visits

## 2018-10-10 ENCOUNTER — LAB ONLY (OUTPATIENT)
Dept: OBGYN CLINIC | Age: 33
End: 2018-10-10

## 2018-10-10 DIAGNOSIS — N92.6 IRREGULAR MENSES: Primary | ICD-10-CM

## 2018-10-11 LAB — PROGEST SERPL-MCNC: 8.4 NG/ML

## 2018-10-17 RX ORDER — LETROZOLE 2.5 MG/1
5 TABLET, FILM COATED ORAL DAILY
Qty: 10 TAB | Refills: 3 | Status: SHIPPED | OUTPATIENT
Start: 2018-10-17 | End: 2018-10-22

## 2018-10-18 ENCOUNTER — TELEPHONE (OUTPATIENT)
Dept: OBGYN CLINIC | Age: 33
End: 2018-10-18

## 2018-10-22 RX ORDER — MEDROXYPROGESTERONE ACETATE 10 MG/1
TABLET ORAL
Qty: 10 TAB | Refills: 0 | OUTPATIENT
Start: 2018-10-22

## 2018-11-05 ENCOUNTER — LAB ONLY (OUTPATIENT)
Dept: OBGYN CLINIC | Age: 33
End: 2018-11-05

## 2018-11-05 DIAGNOSIS — N92.6 IRREGULAR BLEEDING: Primary | ICD-10-CM

## 2018-11-06 LAB — PROGEST SERPL-MCNC: 19.4 NG/ML

## 2018-12-19 ENCOUNTER — OFFICE VISIT (OUTPATIENT)
Dept: INTERNAL MEDICINE CLINIC | Age: 33
End: 2018-12-19

## 2018-12-19 VITALS
SYSTOLIC BLOOD PRESSURE: 120 MMHG | HEART RATE: 101 BPM | DIASTOLIC BLOOD PRESSURE: 80 MMHG | BODY MASS INDEX: 42.13 KG/M2 | HEIGHT: 68 IN | OXYGEN SATURATION: 97 % | TEMPERATURE: 98.5 F | RESPIRATION RATE: 18 BRPM | WEIGHT: 278 LBS

## 2018-12-19 DIAGNOSIS — H61.21 IMPACTED CERUMEN OF RIGHT EAR: Primary | ICD-10-CM

## 2018-12-19 DIAGNOSIS — Z3A.01 LESS THAN 8 WEEKS GESTATION OF PREGNANCY: ICD-10-CM

## 2018-12-19 DIAGNOSIS — Z23 ENCOUNTER FOR IMMUNIZATION: ICD-10-CM

## 2018-12-19 NOTE — PROGRESS NOTES
Patient would like to discuss having a Tdap because she will be visited by her nephew who is 2 weeks old.

## 2018-12-21 NOTE — PROGRESS NOTES
HISTORY OF PRESENT ILLNESS    Presents with need for TDAP. She is 5 weeks pregnant and her nephew who is 2 weeks old is fisting. Reports some right ear pressure and hx of cerumen impaction      Review of Systems   All other systems reviewed and are negative, except as noted in HPI    Past Medical and Surgical History   has a past medical history of Dysmenorrhea, Family history of breast cancer, HPV vaccine counseling, Knee pain, chronic, and Patellofemoral arthralgia of both knees. has no past surgical history on file. reports that  has never smoked. she has never used smokeless tobacco. She reports that she drinks alcohol. She reports that she does not use drugs. family history includes Breast Cancer (age of onset: 28) in her mother; High Cholesterol in her mother; Hypertension in her mother; Stroke in her paternal grandfather; Thyroid Disease in her mother. Physical Exam   Nursing note and vitals reviewed. Blood pressure 120/80, pulse (!) 101, temperature 98.5 °F (36.9 °C), temperature source Oral, resp. rate 18, height 5' 8\" (1.727 m), weight 278 lb (126.1 kg), last menstrual period 11/14/2018, SpO2 97 %. Constitutional: In no distress. Eyes: Conjunctivae are normal.  R cerumen impaction  HEENT:  No LAD or thyromegaly   Cardiovascular: Normal rate. regular rhythm. No murmurs  No edema  Pulmonary/Chest: Effort normal. clear to ausculation blaterally  Musculoskeletal:  no edema. Abd:  Neurological: Alert and oriented. Grossly intact cranial nerves and motor function. Skin: No rash noted. Psychiatric: Normal mood and affect. Behavior is normal.     ASSESSMENT and PLAN  Diagnoses and all orders for this visit:    1. Impacted cerumen of right ear  Firm. Use debrox. Irrigate. 2. Less than 8 weeks gestation of pregnancy  Seeing OB  TDAP today. Then may need to repeat 3rd trimester    3.  Encounter for immunization  -     TETANUS, DIPHTHERIA TOXOIDS AND ACELLULAR PERTUSSIS VACCINE (TDAP), IN INDIVIDS. >=7, IM  -     SD IMMUNIZ ADMIN,1 SINGLE/COMB VAC/TOXOID        lab results and schedule of future lab studies reviewed with patient  reviewed medications and side effects in detail    Return to clinic for further evaluation if new symptoms develop or if current symptoms worsen or fail to resolve. There are no Patient Instructions on file for this visit.

## 2019-01-04 ENCOUNTER — OFFICE VISIT (OUTPATIENT)
Dept: OBGYN CLINIC | Age: 34
End: 2019-01-04

## 2019-01-04 VITALS
SYSTOLIC BLOOD PRESSURE: 118 MMHG | BODY MASS INDEX: 41.37 KG/M2 | DIASTOLIC BLOOD PRESSURE: 72 MMHG | HEIGHT: 68 IN | WEIGHT: 273 LBS

## 2019-01-04 DIAGNOSIS — Z34.00 SUPERVISION OF NORMAL FIRST PREGNANCY, ANTEPARTUM: Primary | ICD-10-CM

## 2019-01-04 LAB
ANTIBODY SCREEN, EXTERNAL: NORMAL
BILIRUB UR QL STRIP: NEGATIVE
CHLAMYDIA, EXTERNAL: NORMAL
GLUCOSE UR-MCNC: NEGATIVE MG/DL
HBSAG, EXTERNAL: NORMAL
HCT, EXTERNAL: 42.3
HGB, EXTERNAL: 15.2
HIV, EXTERNAL: NORMAL
KETONES P FAST UR STRIP-MCNC: NEGATIVE MG/DL
N. GONORRHEA, EXTERNAL: NORMAL
PH UR STRIP: NORMAL [PH] (ref 4.6–8)
PLATELET CNT,   EXTERNAL: 271
PROT UR QL STRIP: NEGATIVE
RUBELLA, EXTERNAL: NORMAL
SP GR UR STRIP: NORMAL (ref 1–1.03)
T. PALLIDUM, EXTERNAL: NORMAL
TYPE, ABO & RH, EXTERNAL: NORMAL
UA UROBILINOGEN AMB POC: NORMAL (ref 0.2–1)
URINALYSIS CLARITY POC: CLEAR
URINALYSIS COLOR POC: YELLOW
URINALYSIS, EXTERNAL: NORMAL
URINE BLOOD POC: NEGATIVE
URINE LEUKOCYTES POC: NEGATIVE
URINE NITRITES POC: NEGATIVE

## 2019-01-04 NOTE — PROGRESS NOTES
Current pregnancy history:    [place] is a 34 y.o. female who presents for the evaluation of pregnancy. Patient's last menstrual period was 2018. LMP history:  The date of her LMP is  certain. Her last menstrual period was normal and lasted for 4 to 5 days. A urine pregnancy test was positive  weeks ago. She was not on the pill at conception. Based on her LMP, her EDC is 2019 and her EGA is 7 weeks,3 days. Her menstrual cycles are regular and occur approximately every 28 days  and range from 3 to 5 days. The last menses lasted  the usual number of days. Ultrasound data:  She had an  ultrasound done by the ultrasound tech today which revealed a viable talley pregnancy with a gestational age of 9 weeks and 4 days giving an EDC of 19  TA ULTRASOUND PERFORMED. A SINGLE VIABLE 7W4D IUP IS SEEN WITH NORMAL CARDIAC RHYTHM. GESTATIONAL AGE BASED ON TODAYS US.  A NORMAL APPEARING [address] IS SEEN. RIGHT & LEFT OVARIES APPEAR WITHIN NORMAL LIMITS. A CL CYST IS SEEN ON THE RIGHT OVARY. NO FREE FLUID SEEN IN THE CDS. Pregnancy symptoms:    Since her LMP she has experienced  urinary frequency, breast tenderness, and nausea. She has not been vomiting over the last few weeks. Associated signs and symptoms which she denies: dysuria, discharge, vaginal bleeding. She states she has gained weight:  Approximately 5 pounds over the last few weeks. Relevant past pregnancy history:   She has the following pregnancy history:      She has no history of  delivery. Relevant past medical history:(relevant to this pregnancy): noncontributory. Received flu vaccine 2018     Pap/Occupational history:  Last pap smear: 2018 Results: Normal/-HPV     Her occupation is: . Substance history: negative for alcohol, tobacco and street drugs. Positive for nothing. Exposure history: There are no indoor cat/s in the home.   She denies close contact with children on a regular basis. She has had chicken pox or the vaccine in the past.   Patient denies issues with domestic violence. Genetic Screening/Teratology Counseling: (Includes patient, baby's father, or anyone in either family with:)  3.  Patient's age >/= 28 at [facility]?-- no  .   2. Thalassemia ([facility] [place], [address], or  background): MCV<80?--no.     3.  Neural tube defect (meningomyelocele, spina bifida, anencephaly)?--no.   4.  Congenital heart defect?--no.  5.  Down syndrome?--no.   6.  Remigio-Sachs (Gnosticism, [place])?--no.   7.  Canavan's Disease?--no.   8.  Familial Dysautonomia?--no.   9.  Sickle cell disease or trait ()? --no   The patient has not been tested for sickle trait  10. Hemophilia or other blood disorders?--no. 11.  Muscular dystrophy?--no. 12.  Cystic fibrosis?--no. 13.  [place]'s Chorea?--no. 14.  Mental retardation/autism (if yes was person tested for Fragile X)?--no. 15.  Other inherited genetic or chromosomal disorder?--no. 12.  Maternal metabolic disorder (DM, PKU, etc)?--no. 17.  Patient or FOB with a child with a birth defect not listed above?--no.  17a. Patient or FOB with a birth defect themselves?--no. 18.  Recurrent pregnancy loss, or stillbirth?--no. 19.  Any medications since LMP other than prenatal vitamins (include vitamins,  supplements, OTC meds, drugs, alcohol)?--no. 20.  Any other genetic/environmental exposure to discuss?--no. Infection History:  1. Lives with someone with TB or TB exposed?--no.   2.  Patient or partner has history of genital herpes?--no.  3.  Rash or viral illness since LMP?--no.    4.  History of STD (GC, CT, HPV, syphilis, HIV)? --no   5. Other: OTHER? Obstetric History       T0      L0     SAB0   TAB0   Ectopic0   Molar0   Multiple0   Live Births0       # Outcome Date GA Lbr Morgan/2nd Weight Sex Delivery Anes PTL Lv   1 Current               Obstetric Comments   Menarche:  17.    LMP: 08/26/2014. # of Children:  n/a. Age at Delivery of First Child:  n/a. Hysterectomy/oophorectomy:  NO/NO. Breast Bx no. Hx of Breast Feeding:  n/a. BCP:  yes. Hormone therapy:  no.         Past Medical History:   Diagnosis Date    Dysmenorrhea     Dr. Ebony Xiong Family history of breast cancer     mother age 28. seeing Dr. Ellie Levine    HPV vaccine counseling     gardasil series complete    Knee pain, chronic     softball injuries. cartillage damage    Patellofemoral arthralgia of both knees     hx of rowing, catching     History reviewed. No pertinent surgical history. Social History     Occupational History    Occupation:      Employer: [employer] [address] Occupation:      Employer: SELF EMPLOYED   Tobacco Use    Smoking status: Never Smoker    Smokeless tobacco: Never Used   Substance and Sexual Activity    Alcohol use: Yes     Comment: 2-3 drinks per week    Drug use: No    Sexual activity: Yes     Partners: Male     Birth control/protection: Pill     Family History   Problem Relation Age of Onset    Hypertension Mother     Breast Cancer Mother 28    High Cholesterol Mother     Thyroid Disease Mother         nodule    Stroke Paternal Grandfather        Allergies   Allergen Reactions    Augmentin [Amoxicillin-Pot Clavulanate] Hives    Bactrim [Sulfamethoprim] Hives    Penicillins Unable to Obtain    Sulfamethoxazole-Trimethoprim Hives     Prior to Admission medications    Medication Sig Start Date End Date Taking? Authorizing Provider   PNV IP.59/QWDGISJ fum/folic ac (PRENATAL PO) Take  by mouth.    Yes Provider, Historical        Review of Systems: History obtained from the patient  Constitutional: negative for weight loss, fever, night sweats  HEENT: negative for hearing loss, earache, congestion, snoring, sorethroat  CV: negative for chest pain, palpitations, edema  Resp: negative for cough, shortness of breath, wheezing  Breast: negative for breast lumps, nipple discharge, galactorrhea  GI: negative for change in bowel habits, abdominal pain, black or bloody stools  : negative for frequency, dysuria, hematuria, vaginal discharge  MSK: negative for back pain, joint pain, muscle pain  Skin: negative for itching, rash, hives  Neuro: negative for dizziness, headache, confusion, weakness  Psych: negative for anxiety, depression, change in mood  Heme/lymph: negative for bleeding, bruising, pallor    Objective:  Visit Vitals  /72   Ht 5' 8\" (1.727 m)   Wt 273 lb (123.8 kg)   LMP 11/14/2018   BMI 41.51 kg/m²       Physical Exam:   PHYSICAL EXAMINATION    Constitutional  · Appearance: well-nourished, well developed, alert, in no acute distress    HENT  · Head  · Face: appears normal  · Eyes: appear normal  · Ears: normal  · Mouth: normal  · Lips: no lesions    Neck  · Inspection/Palpation: normal appearance, no masses or tenderness  · Lymph Nodes: no lymphadenopathy present  · Thyroid: gland size normal, nontender, no nodules or masses present on palpation    Chest  · Respiratory Effort: breathing unlabored  · Auscultation: normal breath sounds    Cardiovascular  · Heart:  · Auscultation: regular rate and rhythm without murmur    Breasts  · Inspection of Breasts: breasts symmetrical, no skin changes, no discharge present, nipple appearance normal, no skin retraction present  · Palpation of Breasts and Axillae: no masses present on palpation, no breast tenderness  · Axillary Lymph Nodes: no lymphadenopathy present    Gastrointestinal  · Abdominal Examination: abdomen non-tender to palpation, normal bowel sounds, no masses present  · Liver and spleen: no hepatomegaly present, spleen not palpable  · Hernias: no hernias identified    Genitourinary  · External Genitalia: normal appearance for age, no discharge present, no tenderness present, no inflammatory lesions present, no masses present, no atrophy present  · Vagina: normal vaginal vault without central or paravaginal defects, no discharge present, no inflammatory lesions present, no masses present  · Bladder: non-tender to palpation  · Urethra: appears normal  · Cervix: normal   · Uterus: enlarged, normal shape, soft  · Adnexa: no adnexal tenderness present, no adnexal masses present  · Perineum: perineum within normal limits, no evidence of trauma, no rashes or skin lesions present  · Anus: anus within normal limits, no hemorrhoids present  · Inguinal Lymph Nodes: no lymphadenopathy present    Skin  · General Inspection: no rash, no lesions identified    Neurologic/Psychiatric  · Mental Status:  · Orientation: grossly oriented to person, place and time  · Mood and Affect: mood normal, affect appropriate    Assessment:   Intrauterine pregnancy with the following problems identified:   EDC 2019 by D=  Femara conception  Had flu vaccine  NIPTS/[facility] next visit        Plan:     Offered CF testing, CVS, Nuchal Translucency, MSAFP, amnio, and discussed NIPT  Course of pregnancy discussed including visit schedule, routine U/S, glucola testing, etc.  Avoid alcoholic beverages and illicit/recreational drugs use  Take prenatal vitamins or folic acid daily. Hospital and practice style discussed with coverage system. Discussed nutrition, toxoplasmosis precautions, sexual activity, exercise, need for influenza vaccine, environmental and work hazards, travel advice, screen for domestic violence, need for seat belts. Discussed seafood, unpasteurized dairy products, deli meat, artificial sweeteners, and caffeine. Information on prenatal classes/breastfeeding given. Information on circumcision given  Patient encouraged not to smoke. Discussed current prescription drug use. Given medication list.  Discussed the use of over the counter medications and chemicals. Route of delivery discussed, including risks, benefits, and alternatives of  versus repeat LTCS.   Pt understands risk of hemorrhage during pregnancy and post delivery and would accept blood products if necessary in life-threatening emergencies      Handouts given to pt.

## 2019-01-06 LAB
BACTERIA UR CULT: NO GROWTH
C TRACH RRNA SPEC QL NAA+PROBE: NEGATIVE
N GONORRHOEA RRNA SPEC QL NAA+PROBE: NEGATIVE
T VAGINALIS RRNA VAG QL NAA+PROBE: NEGATIVE

## 2019-01-08 LAB
ABO GROUP BLD: NORMAL
BLD GP AB SCN SERPL QL: NEGATIVE
ERYTHROCYTE [DISTWIDTH] IN BLOOD BY AUTOMATED COUNT: 13.8 % (ref 12.3–15.4)
HBV SURFACE AG SERPL QL IA: NEGATIVE
HCT VFR BLD AUTO: 42.3 % (ref 34–46.6)
HGB BLD-MCNC: 15.2 G/DL (ref 11.1–15.9)
HIV 1+2 AB+HIV1 P24 AG SERPL QL IA: NON REACTIVE
MCH RBC QN AUTO: 32 PG (ref 26.6–33)
MCHC RBC AUTO-ENTMCNC: 35.9 G/DL (ref 31.5–35.7)
MCV RBC AUTO: 89 FL (ref 79–97)
PLATELET # BLD AUTO: 271 X10E3/UL (ref 150–379)
RBC # BLD AUTO: 4.75 X10E6/UL (ref 3.77–5.28)
RH BLD: NEGATIVE
RUBV IGG SERPL IA-ACNC: 3.73 INDEX
T PALLIDUM AB SER QL IA: NEGATIVE
WBC # BLD AUTO: 9.9 X10E3/UL (ref 3.4–10.8)

## 2019-01-10 DIAGNOSIS — Z34.00 SUPERVISION OF NORMAL FIRST PREGNANCY, ANTEPARTUM: ICD-10-CM

## 2019-01-31 ENCOUNTER — ROUTINE PRENATAL (OUTPATIENT)
Dept: OBGYN CLINIC | Age: 34
End: 2019-01-31

## 2019-01-31 VITALS — WEIGHT: 272 LBS | BODY MASS INDEX: 41.36 KG/M2 | SYSTOLIC BLOOD PRESSURE: 128 MMHG | DIASTOLIC BLOOD PRESSURE: 70 MMHG

## 2019-01-31 DIAGNOSIS — Z34.00 SUPERVISION OF NORMAL FIRST PREGNANCY, ANTEPARTUM: Primary | ICD-10-CM

## 2019-01-31 NOTE — PROGRESS NOTES
Problem List  Date Reviewed: 12/20/2018          Codes Class Noted    Supervision of normal first pregnancy, antepartum ICD-10-CM: Z34.00  ICD-9-CM: V22.0  1/10/2019    Overview Signed 1/10/2019  5:49 PM by Jenise Fall, April     Intrauterine pregnancy with the following problems identified:   [facility] [place] 8/20/2019 by D=  Femara conception  Had flu vaccine  NIPTS/[facility] next visit  RH neg             Obesity, morbid ([address] ICD-10-CM: E66.01  ICD-9-CM: 278.01  8/24/2018        At high risk for breast cancer ICD-10-CM: Z91.89  ICD-9-CM: V49.89  5/8/2017    Overview Signed 5/8/2017  4:03 PM by Jb Pressley risk 24.3 %             Dysmenorrhea ICD-10-CM: N94.6  ICD-9-CM: 625.3  Unknown        Family history of breast cancer ICD-10-CM: Z80.3  ICD-9-CM: V16.3  9/15/2014    Overview Addendum 5/8/2017  4:02 PM by Jb Nichols     Mother diagnosed with breast cancer age 35(Mallory Reyes ), maternal cousin, maternal aunt also with breast cancer all post menopausal. Her mother does not carry the BRCA gene.

## 2019-01-31 NOTE — PROGRESS NOTES
Doing great.   [facility] and NIPTS today - fetal sex not to be determined  Questions about cord blood banking addressed

## 2019-01-31 NOTE — PATIENT INSTRUCTIONS
Weeks 10 to 14 of Your Pregnancy: Care Instructions  Your Care Instructions    By weeks 10 to 14 of your pregnancy, the placenta has formed inside your uterus. It is possible to hear your baby's heartbeat with a special ultrasound device. Your baby's eyes can and do move. The arms and legs can bend. This is a good time to think about testing for birth defects. There are two types of tests: screening and diagnostic. Screening tests show the chance that a baby has a certain birth defect. They can't tell you for sure that your baby has a problem. Diagnostic tests show if a baby has a certain birth defect. It's your choice whether to have these tests. You and your partner can talk to your doctor or midwife about birth defects tests. Follow-up care is a key part of your treatment and safety. Be sure to make and go to all appointments, and call your doctor if you are having problems. It's also a good idea to know your test results and keep a list of the medicines you take. How can you care for yourself at home? Decide about tests  · You can have screening tests and diagnostic tests to check for birth defects. The decision to have a test for birth defects is personal. Think about your age, your chance of passing on a family disease, your need to know about any problems, and what you might do after you have the test results. ? Triple or quadruple (quad) blood tests. These screening tests can be done between 15 and 20 weeks of pregnancy. They check the amounts of three or four substances in your blood. The doctor looks at these test results, along with your age and other factors, to find out the chance that your baby may have certain problems. ? Amniocentesis. This diagnostic test is used to look for chromosomal problems in the baby's cells.  It can be done between 15 and 20 weeks of pregnancy, usually around week 16.  ? Nuchal translucency test. This test uses ultrasound to measure the thickness of the area at the back of the baby's neck. An increase in the thickness can be an early sign of Down syndrome. ? Chorionic villus sampling (CVS). This is a test that looks for certain genetic problems with your baby. The same genes that are in your baby are in the placenta. A small piece of the placenta is taken out and tested. This test is done when you are 10 to 13 weeks pregnant. Ease discomfort  · Slow down and take naps when you feel tired. · If your emotions swing, talk to someone. Crying, anxiety, and concentration problems are common. · If your gums bleed, try a softer toothbrush. If your gums are puffy and bleed a lot, see your dentist.  · If you feel dizzy:  ? Get up slowly after sitting or lying down. ? Drink plenty of fluids. ? Eat small snacks to keep your blood sugar stable. ? Put your head between your legs as though you were tying your shoelaces. ? Lie down with your legs higher than your head. Use pillows to prop up your feet. · If you have a headache:  ? Lie down. ? Ask your partner or a good friend for a neck massage. ? Try cool cloths over your forehead or across the back of your neck. ? Use acetaminophen (Tylenol) for pain relief. Do not use nonsteroidal anti-inflammatory drugs (NSAIDs), such as ibuprofen (Advil, Motrin) or naproxen (Aleve), unless your doctor says it is okay. · If you have a nosebleed, pinch your nose gently, and hold it for a short while. To prevent nosebleeds, try massaging a small dab of petroleum jelly, such as Vaseline, in your nostrils. · If your nose is stuffed up, try saline (saltwater) nose sprays. Do not use decongestant sprays. Care for your breasts  · Wear a bra that gives you good support. · Know that changes in your breasts are normal.  ? Your breasts may get larger and more tender. Tenderness usually gets better by 12 weeks. ? Your nipples may get darker and larger, and small bumps around your nipples may show more. ?  The veins in your chest and breasts may show more. · Don't worry about \"toughening'\" your nipples. Breastfeeding will naturally do this. Where can you learn more? Go to http://jared-leia.info/. Enter B482 in the search box to learn more about \"Weeks 10 to 14 of Your Pregnancy: Care Instructions. \"  Current as of: September 5, 2018  Content Version: 11.9  © 7391-3911 [employer]. Care instructions adapted under license by [employer] (which disclaims liability or warranty for this information). If you have questions about a medical condition or this instruction, always ask your healthcare professional. [address] any warranty or liability for your use of this information.

## 2019-02-15 ENCOUNTER — LAB ONLY (OUTPATIENT)
Dept: OBGYN CLINIC | Age: 34
End: 2019-02-15

## 2019-02-15 DIAGNOSIS — Z13.79 ENCOUNTER FOR OTHER SCREENING FOR GENETIC AND CHROMOSOMAL ANOMALIES: Primary | ICD-10-CM

## 2019-02-28 ENCOUNTER — ROUTINE PRENATAL (OUTPATIENT)
Dept: OBGYN CLINIC | Age: 34
End: 2019-02-28

## 2019-02-28 VITALS — DIASTOLIC BLOOD PRESSURE: 76 MMHG | SYSTOLIC BLOOD PRESSURE: 110 MMHG | WEIGHT: 272 LBS | BODY MASS INDEX: 41.36 KG/M2

## 2019-02-28 DIAGNOSIS — Z34.00 SUPERVISION OF NORMAL FIRST PREGNANCY, ANTEPARTUM: Primary | ICD-10-CM

## 2019-02-28 DIAGNOSIS — Z3A.15 15 WEEKS GESTATION OF PREGNANCY: ICD-10-CM

## 2019-02-28 DIAGNOSIS — O99.212 OBESITY AFFECTING PREGNANCY IN SECOND TRIMESTER: ICD-10-CM

## 2019-02-28 LAB — AFPT, MATERNAL, EXTERNAL: NORMAL

## 2019-02-28 NOTE — PATIENT INSTRUCTIONS

## 2019-02-28 NOTE — PROGRESS NOTES
Problem List  Date Reviewed: 1/31/2019          Codes Class Noted    Supervision of normal first pregnancy, antepartum ICD-10-CM: Z34.00  ICD-9-CM: V22.0  1/10/2019    Overview Addendum 2/12/2019 11:43 AM by Adriana Key, April     Intrauterine pregnancy with the following problems identified:   EDC 8/20/2019 by D=US  Femara conception  Had flu vaccine  NIPTS being repeated 2/15 due to insuff fetal DNA  RH neg             Obesity, morbid ([address] ICD-10-CM: E66.01  ICD-9-CM: 278.01  8/24/2018        At high risk for breast cancer ICD-10-CM: Z91.89  ICD-9-CM: V49.89  5/8/2017    Overview Signed 5/8/2017  4:03 PM by Aneudy Pressley risk 24.3 %             Dysmenorrhea ICD-10-CM: N94.6  ICD-9-CM: 625.3  Unknown        Family history of breast cancer ICD-10-CM: Z80.3  ICD-9-CM: V16.3  9/15/2014    Overview Addendum 5/8/2017  4:02 PM by Aneudy Botello     Mother diagnosed with breast cancer age 35(Mallory Everett ), maternal cousin, maternal aunt also with breast cancer all post menopausal. Her mother does not carry the BRCA gene.

## 2019-03-03 LAB
AFP ADJ MOM SERPL: 1.27
AFP INTERP SERPL-IMP: NORMAL
AFP INTERP SERPL-IMP: NORMAL
AFP SERPL-MCNC: 25.1 NG/ML
AGE AT DELIVERY: 32 YR
COMMENT, 018013: NORMAL
GA METHOD: NORMAL
GA: 15.3 WEEKS
IDDM PATIENT QL: NO
MULTIPLE PREGNANCY: NO
NEURAL TUBE DEFECT RISK FETUS: 5251 %
RESULTS, 017004: NORMAL

## 2019-03-16 LAB
2ND TRIMESTER 4 SCREEN SERPL-IMP: NORMAL
2ND TRIMESTER 4 SCREEN SERPL-IMP: NORMAL
AFP ADJ MOM SERPL: 1.27
AFP SERPL-MCNC: 25.1 NG/ML
AGE AT DELIVERY: 32 YR
B-HCG SERPL-SCNC: 1.21 MMOL/L
B-HCG SERPL-SCNC: NORMAL MIU/ML
COMMENTS, 018014: NORMAL
FET TS 18 RISK FROM MAT AGE: NORMAL
FET TS 21 RISK FROM MAT AGE: 527
GA METHOD: NORMAL
GA: 15.3 WEEKS
IDDM PATIENT QL: NO
INHIBIN A ADJ MOM SERPL: 0.9
INHIBIN A SERPL-MCNC: 116.7 PG/ML
Lab: NORMAL
MULTIPLE PREGNANCY: NO
NEURAL TUBE DEFECT RISK FETUS: 5251 %
SPECIMEN STATUS REPORT, ROLRST: NORMAL
TS 18 RISK FETUS: NORMAL
TS 21 RISK FETUS: 1097
U ESTRIOL ADJ MOM SERPL: 0.61
U ESTRIOL SERPL-MCNC: 0.33 NG/ML

## 2019-03-20 DIAGNOSIS — Z34.00 SUPERVISION OF NORMAL FIRST PREGNANCY, ANTEPARTUM: ICD-10-CM

## 2019-04-02 ENCOUNTER — ROUTINE PRENATAL (OUTPATIENT)
Dept: OBGYN CLINIC | Age: 34
End: 2019-04-02

## 2019-04-02 ENCOUNTER — HOSPITAL ENCOUNTER (OUTPATIENT)
Dept: PERINATAL CARE | Age: 34
Discharge: HOME | End: 2019-04-02
Attending: OBSTETRICS & GYNECOLOGY
Payer: COMMERCIAL

## 2019-04-02 VITALS — BODY MASS INDEX: 40.75 KG/M2 | WEIGHT: 268 LBS | SYSTOLIC BLOOD PRESSURE: 108 MMHG | DIASTOLIC BLOOD PRESSURE: 70 MMHG

## 2019-04-02 DIAGNOSIS — Z34.00 SUPERVISION OF NORMAL FIRST PREGNANCY, ANTEPARTUM: Primary | ICD-10-CM

## 2019-04-02 PROCEDURE — 76811 OB US DETAILED SNGL FETUS: CPT | Performed by: OBSTETRICS & GYNECOLOGY

## 2019-04-02 NOTE — PROGRESS NOTES
Problem List  Date Reviewed: 2/28/2019          Codes Class Noted    Supervision of normal first pregnancy, antepartum ICD-10-CM: Z34.00  ICD-9-CM: V22.0  1/10/2019    Overview Addendum 3/20/2019  4:52 PM by Romie Mejia, April     Intrauterine pregnancy with the following problems identified:   EDC 8/20/2019 by D=US  Femara conception  Had flu vaccine  NIPTS being repeated 2/15 due to insuff fetal DNA - still insufficient  AFP/tetra--negative  RH neg  BMI>40             Obesity, morbid ([address] ICD-10-CM: E66.01  ICD-9-CM: 278.01  8/24/2018        At high risk for breast cancer ICD-10-CM: Z91.89  ICD-9-CM: V49.89  5/8/2017    Overview Signed 5/8/2017  4:03 PM by Ana Pressley risk 24.3 %             Dysmenorrhea ICD-10-CM: N94.6  ICD-9-CM: 625.3  Unknown        Family history of breast cancer ICD-10-CM: Z80.3  ICD-9-CM: V16.3  9/15/2014    Overview Addendum 5/8/2017  4:02 PM by Ana Puckett     Mother diagnosed with breast cancer age 35(Mallory Allred ), maternal cousin, maternal aunt also with breast cancer all post menopausal. Her mother does not carry the BRCA gene.

## 2019-04-02 NOTE — PATIENT INSTRUCTIONS
Learning About When to Call Your Doctor During Pregnancy (After 20 Weeks)  Your Care Instructions  It's common to have concerns about what might be a problem during pregnancy. Although most pregnant women don't have any serious problems, it's important to know when to call your doctor if you have certain symptoms or signs of labor. These are general suggestions. Your doctor may give you some more information about when to call. When to call your doctor (after 20 weeks)  Call 911 anytime you think you may need emergency care. For example, call if:  · You have severe vaginal bleeding. · You have sudden, severe pain in your belly. · You passed out (lost consciousness). · You have a seizure. · You see or feel the umbilical cord. · You think you are about to deliver your baby and can't make it safely to the hospital.  Call your doctor now or seek immediate medical care if:  · You have vaginal bleeding. · You have belly pain. · You have a fever. · You have symptoms of preeclampsia, such as:  ? Sudden swelling of your face, hands, or feet. ? New vision problems (such as dimness or blurring). ? A severe headache. · You have a sudden release of fluid from your vagina. (You think your water broke.)  · You think that you may be in labor. This means that you've had at least 4 contractions within 20 minutes or at least 8 contractions in an hour. · You notice that your baby has stopped moving or is moving much less than normal.  · You have symptoms of a urinary tract infection. These may include:  ? Pain or burning when you urinate. ? A frequent need to urinate without being able to pass much urine. ? Pain in the flank, which is just below the rib cage and above the waist on either side of the back. ? Blood in your urine. Watch closely for changes in your health, and be sure to contact your doctor if:  · You have vaginal discharge that smells bad. · You have skin changes, such as:  ?  A rash.  ? Itching. ? Yellow color to your skin. · You have other concerns about your pregnancy. If you have labor signs at 37 weeks or more  If you have signs of labor at 37 weeks or more, your doctor may tell you to call when your labor becomes more active. Symptoms of active labor include:  · Contractions that are regular. · Contractions that are less than 5 minutes apart. · Contractions that are hard to talk through. Follow-up care is a key part of your treatment and safety. Be sure to make and go to all appointments, and call your doctor if you are having problems. It's also a good idea to know your test results and keep a list of the medicines you take. Where can you learn more? Go to http://jared-leia.info/. Enter  in the search box to learn more about \"Learning About When to Call Your Doctor During Pregnancy (After 20 Weeks). \"  Current as of: September 5, 2018  Content Version: 11.9  © 1103-7016 [employer]. Care instructions adapted under license by [employer] (which disclaims liability or warranty for this information). If you have questions about a medical condition or this instruction, always ask your healthcare professional. [place] [address] any warranty or liability for your use of this information.

## 2019-04-30 ENCOUNTER — ROUTINE PRENATAL (OUTPATIENT)
Dept: OBGYN CLINIC | Age: 34
End: 2019-04-30

## 2019-04-30 VITALS — DIASTOLIC BLOOD PRESSURE: 72 MMHG | SYSTOLIC BLOOD PRESSURE: 118 MMHG | BODY MASS INDEX: 40.9 KG/M2 | WEIGHT: 269 LBS

## 2019-04-30 DIAGNOSIS — Z34.00 SUPERVISION OF NORMAL FIRST PREGNANCY, ANTEPARTUM: Primary | ICD-10-CM

## 2019-04-30 NOTE — PATIENT INSTRUCTIONS
Weeks 22 to 26 of Your Pregnancy: Care Instructions  Your Care Instructions    As you enter your 7th month of pregnancy at week 26, your baby's lungs are growing stronger and getting ready to breathe. You may notice that your baby responds to the sound of your or your partner's voice. You may also notice that your baby does less turning and twisting and more squirming or jerking. Jerking often means that your baby has the hiccups. Hiccups are perfectly normal and are only temporary. You may want to think about attending a childbirth preparation class. This is also a good time to start thinking about whether you want to have pain medicine during labor. Most pregnant women are tested for gestational diabetes between weeks 25 and 28. Gestational diabetes occurs when your blood sugar level gets too high when you're pregnant. The test is important, because you can have gestational diabetes and not know it. But the condition can cause problems for your baby. Follow-up care is a key part of your treatment and safety. Be sure to make and go to all appointments, and call your doctor if you are having problems. It's also a good idea to know your test results and keep a list of the medicines you take. How can you care for yourself at home? Ease discomfort from your baby's kicking  · Change your position. Sometimes this will cause your baby to change position too. · Take a deep breath while you raise your arm over your head. Then breathe out while you drop your arm. Do Kegel exercises to prevent urine from leaking  · You can do Kegel exercises while you stand or sit. ? Squeeze the same muscles you would use to stop your urine. Your belly and thighs should not move. ? Hold the squeeze for 3 seconds, and then relax for 3 seconds. ? Start with 3 seconds. Then add 1 second each week until you are able to squeeze for 10 seconds. ? Repeat the exercise 10 to 15 times for each session.  Do three or more sessions each day.  Ease or reduce swelling in your feet, ankles, hands, and fingers  · If your fingers are puffy, take off your rings. · Do not eat high-salt foods, such as potato chips. · Prop up your feet on a stool or couch as much as possible. Sleep with pillows under your feet. · Do not stand for long periods of time or wear tight shoes. · Wear support stockings. Where can you learn more? Go to http://jared-leia.info/. Enter G264 in the search box to learn more about \"Weeks 22 to 26 of Your Pregnancy: Care Instructions. \"  Current as of: September 5, 2018  Content Version: 11.9  © 1481-9824 [email or web address]. Care instructions adapted under license by [employer] (which disclaims liability or warranty for this information). If you have questions about a medical condition or this instruction, always ask your healthcare professional. [place] [address] any warranty or liability for your use of this information.

## 2019-04-30 NOTE — PROGRESS NOTES
Problem List  Date Reviewed: 4/2/2019          Codes Class Noted    Supervision of normal first pregnancy, antepartum ICD-10-CM: Z34.00  ICD-9-CM: V22.0  1/10/2019    Overview Addendum 3/20/2019  4:52 PM by Olegario April     Intrauterine pregnancy with the following problems identified:   EDC 8/20/2019 by D=US  Femara conception  Had flu vaccine  NIPTS being repeated 2/15 due to insuff fetal DNA - still insufficient  AFP/tetra--negative  RH neg  BMI>40             Obesity, morbid ([address] ICD-10-CM: E66.01  ICD-9-CM: 278.01  8/24/2018        At high risk for breast cancer ICD-10-CM: Z91.89  ICD-9-CM: V49.89  5/8/2017    Overview Signed 5/8/2017  4:03 PM by Hailey Pressley risk 24.3 %             Dysmenorrhea ICD-10-CM: N94.6  ICD-9-CM: 625.3  Unknown        Family history of breast cancer ICD-10-CM: Z80.3  ICD-9-CM: V16.3  9/15/2014    Overview Addendum 5/8/2017  4:02 PM by Hailey Hadley     Mother diagnosed with breast cancer age 35(Mallory Chávez ), maternal cousin, maternal aunt also with breast cancer all post menopausal. Her mother does not carry the BRCA gene.

## 2019-05-20 ENCOUNTER — OFFICE VISIT (OUTPATIENT)
Dept: SURGERY | Age: 34
End: 2019-05-20

## 2019-05-20 VITALS
DIASTOLIC BLOOD PRESSURE: 44 MMHG | SYSTOLIC BLOOD PRESSURE: 117 MMHG | BODY MASS INDEX: 41.07 KG/M2 | WEIGHT: 271 LBS | HEIGHT: 68 IN | HEART RATE: 66 BPM

## 2019-05-20 DIAGNOSIS — Z80.3 FAMILY HISTORY OF BREAST CANCER: ICD-10-CM

## 2019-05-20 DIAGNOSIS — N64.59 BREAST THICKENING: ICD-10-CM

## 2019-05-20 DIAGNOSIS — Z91.89 AT HIGH RISK FOR BREAST CANCER: Primary | ICD-10-CM

## 2019-05-20 NOTE — PROGRESS NOTES
HISTORY OF PRESENT ILLNESS  [place] is a 34 y.o. female. HPI  ESTABLISHED patient here for annual follow up high risk for breast cancer.  Patient is doing well.  Is currently six months pregnant, due 8/20/19. Denies any breast lumps, skin changes, or nipple discharge/retraction.       Tyrer-Cusik risk 24.3 %     Family History: mother diagnosed with breast cancer age 28 (Lattie Locus), maternal cousin, maternal aunt also with breast cancer all post menopausal. Her mother does not carry the BRCA gene      Past Medical History:   Diagnosis Date    Dysmenorrhea     Dr. Sammy Avendano Family history of breast cancer     mother age 28. seeing Dr. Nam Guo    HPV vaccine counseling     gardasil series complete    Knee pain, chronic     softball injuries. cartillage damage    Patellofemoral arthralgia of both knees     hx of rowing, catching       No past surgical history on file.     Social History     Socioeconomic History    Marital status:      Spouse name: [facility] Number of children: 0    Years of education: Not on file    Highest education level: Not on file   Occupational History    Occupation:      Employer: [address] Occupation:      Employer: [id number] [employer] resource strain: Not on file    Food insecurity:     Worry: Not on file     Inability: Not on file   [employer] needs:     Medical: Not on file     Non-medical: Not on file   Tobacco Use    Smoking status: Never Smoker    Smokeless tobacco: Never Used   Substance and Sexual Activity    Alcohol use: Yes     Comment: 2-3 drinks per week    Drug use: No    Sexual activity: Yes     Partners: Male     Birth control/protection: None   Lifestyle    Physical activity:     Days per week: Not on file     Minutes per session: Not on file    Stress: Not on file   Relationships    Social connections:     Talks on phone: Not on file     Gets together: Not on file Attends Restoration service: Not on file     Active member of club or organization: Not on file     Attends meetings of clubs or organizations: Not on file     Relationship status: Not on file    Intimate partner violence:     Fear of current or ex partner: Not on file     Emotionally abused: Not on file     Physically abused: Not on file     Forced sexual activity: Not on file   Other Topics Concern    Not on file   Social History Narrative    Not on file       Current Outpatient Medications on File Prior to Visit   Medication Sig Dispense Refill    PNV JE.91/FHOOONX fum/folic ac (PRENATAL PO) Take  by mouth. No current facility-administered medications on file prior to visit. Allergies   Allergen Reactions    Augmentin [Amoxicillin-Pot Clavulanate] Hives    Bactrim [Sulfamethoprim] Hives    Penicillins Unable to Obtain    Sulfamethoxazole-Trimethoprim Hives       OB History        1    Para   0    Term   0       0    AB   0    Living   0       SAB   0    TAB   0    Ectopic   0    Molar        Multiple   0    Live Births              Obstetric Comments   Menarche:  17. LMP: 2014. # of Children:  n/a. Age at Delivery of First Child:  n/a. Hysterectomy/oophorectomy:  NO/NO. Breast Bx no. Hx of Breast Feeding:  n/a. BCP:  yes. Hormone therapy:  no.             ROS    Physical Exam   Cardiovascular: Normal rate and normal heart sounds. Pulmonary/Chest: Breath sounds normal. Right breast exhibits no inverted nipple, no mass, no nipple discharge, no skin change and no tenderness. Left breast exhibits no inverted nipple, no mass, no nipple discharge, no skin change and no tenderness. Breasts are symmetrical.       Lymphadenopathy:        Right cervical: No superficial cervical, no deep cervical and no posterior cervical adenopathy present. Left cervical: No superficial cervical, no deep cervical and no posterior cervical adenopathy present.         Right axillary: No pectoral and no lateral adenopathy present. Left axillary: No pectoral and no lateral adenopathy present. ASSESSMENT and PLAN    ICD-10-CM ICD-9-CM    1. At high risk for breast cancer Z91.89 V49.89    2. Breast thickening N64.59 611.79    3. Family history of breast cancer Z80.3 V16.3       Established pt presents for annual high risk screening, and is doing well overall. Symmetric thickening at upper portion of BL breasts. Pt is pregnant (due 08/20/19) and planning on breastfeeding. Will plan to resume imaging 6 months after she has completed breastfeeding. She will call to schedule f/u for that time. This plan was reviewed with the patient and patient agrees. All questions were answered.     Written by Shruti Courtney, as dictated by Dr. Marquis Jeong MD.

## 2019-05-20 NOTE — PROGRESS NOTES
HISTORY OF PRESENT ILLNESS [place] is a 34 y.o. female. HPI    ESTABLISHED patient here for annual follow up high risk for breast cancer.  Patient is doing well.  Is currently six months pregnant, due 8/20/19. Denies any breast lumps, skin changes, or nipple discharge/retraction.   
  
Tyrer-Cusik risk 24.3 % 
  
Family History: mother diagnosed with breast cancer age 28 (Kelby Washington), maternal cousin, maternal aunt also with breast cancer all post menopausal. Her mother does not carry the BRCA gene. ROS Physical Exam 
 
ASSESSMENT and PLAN 
{ASSESSMENT/PLAN:29937}

## 2019-05-22 NOTE — COMMUNICATION BODY
HISTORY OF PRESENT ILLNESS  [place] is a 34 y.o. female. HPI  ESTABLISHED patient here for annual follow up high risk for breast cancer.  Patient is doing well.  Is currently six months pregnant, due 8/20/19. Denies any breast lumps, skin changes, or nipple discharge/retraction.       Tyrer-Cus[place] risk 24.3 %     Family History: mother diagnosed with breast cancer age 28 (June Castaneda), maternal cousin, maternal aunt also with breast cancer all post menopausal. Her mother does not carry the BRCA gene      Past Medical History:   Diagnosis Date    Dysmenorrhea     Dr. Vasquez Bowers Family history of breast cancer     mother age 28. seeing Dr. Gem Simpson    HPV vaccine counseling     gardasil series complete    Knee pain, chronic     softball injuries. cartillage damage    Patellofemoral arthralgia of both knees     hx of rowing, catching       No past surgical history on file.     Social History     Socioeconomic History    Marital status:      Spouse name: Marcia Orozco Number of children: 0    Years of education: Not on file    Highest education level: Not on file   Occupational History    Occupation:      Employer: [address] Occupation:      Employer: [id number] resource strain: Not on file    Food insecurity:     Worry: Not on file     Inability: Not on file   [employer] needs:     Medical: Not on file     Non-medical: Not on file   Tobacco Use    Smoking status: Never Smoker    Smokeless tobacco: Never Used   Substance and Sexual Activity    Alcohol use: Yes     Comment: 2-3 drinks per week    Drug use: No    Sexual activity: Yes     Partners: Male     Birth control/protection: None   Lifestyle    Physical activity:     Days per week: Not on file     Minutes per session: Not on file    Stress: Not on file   Relationships    Social connections:     Talks on phone: Not on file     Gets together: Not on file Attends Jainism service: Not on file     Active member of club or organization: Not on file     Attends meetings of clubs or organizations: Not on file     Relationship status: Not on file    Intimate partner violence:     Fear of current or ex partner: Not on file     Emotionally abused: Not on file     Physically abused: Not on file     Forced sexual activity: Not on file   Other Topics Concern    Not on file   Social History Narrative    Not on file       Current Outpatient Medications on File Prior to Visit   Medication Sig Dispense Refill    PNV NA.76/CZRLGNW fum/folic ac (PRENATAL PO) Take  by mouth. No current facility-administered medications on file prior to visit. Allergies   Allergen Reactions    Augmentin [Amoxicillin-Pot Clavulanate] Hives    Bactrim [Sulfamethoprim] Hives    Penicillins Unable to Obtain    Sulfamethoxazole-Trimethoprim Hives       OB History        1    Para   0    Term   0       0    AB   0    Living   0       SAB   0    TAB   0    Ectopic   0    Molar        Multiple   0    Live Births              Obstetric Comments   Menarche:  17. LMP: 2014. # of Children:  n/a. Age at Delivery of First Child:  n/a. Hysterectomy/oophorectomy:  NO/NO. Breast Bx no. Hx of Breast Feeding:  n/a. BCP:  yes. Hormone therapy:  no.             ROS    Physical Exam   Cardiovascular: Normal rate and normal heart sounds. Pulmonary/Chest: Breath sounds normal. Right breast exhibits no inverted nipple, no mass, no nipple discharge, no skin change and no tenderness. Left breast exhibits no inverted nipple, no mass, no nipple discharge, no skin change and no tenderness. Breasts are symmetrical.       Lymphadenopathy:        Right cervical: No superficial cervical, no deep cervical and no posterior cervical adenopathy present. Left cervical: No superficial cervical, no deep cervical and no posterior cervical adenopathy present.         Right axillary: No pectoral and no lateral adenopathy present. Left axillary: No pectoral and no lateral adenopathy present. ASSESSMENT and PLAN    ICD-10-CM ICD-9-CM    1. At high risk for breast cancer Z91.89 V49.89    2. Breast thickening N64.59 611.79    3. Family history of breast cancer Z80.3 V16.3       Established pt presents for annual high risk screening, and is doing well overall. Symmetric thickening at upper portion of BL breasts. Pt is pregnant (due 08/20/19) and planning on breastfeeding. Will plan to resume imaging 6 months after she has completed breastfeeding. She will call to schedule f/u for that time. This plan was reviewed with the patient and patient agrees. All questions were answered.     Written by Sameer Butcher, as dictated by Dr. Renny White MD.

## 2019-05-24 ENCOUNTER — ROUTINE PRENATAL (OUTPATIENT)
Dept: OBGYN CLINIC | Age: 34
End: 2019-05-24

## 2019-05-24 VITALS — BODY MASS INDEX: 41.51 KG/M2 | SYSTOLIC BLOOD PRESSURE: 114 MMHG | WEIGHT: 273 LBS | DIASTOLIC BLOOD PRESSURE: 80 MMHG

## 2019-05-24 DIAGNOSIS — Z23 ENCOUNTER FOR IMMUNIZATION: ICD-10-CM

## 2019-05-24 DIAGNOSIS — Z34.00 SUPERVISION OF NORMAL FIRST PREGNANCY, ANTEPARTUM: Primary | ICD-10-CM

## 2019-05-24 DIAGNOSIS — Z29.13 NEED FOR RHOGAM DUE TO RH NEGATIVE MOTHER: ICD-10-CM

## 2019-05-24 LAB
ANTIBODY SCREEN, EXTERNAL: NORMAL
GTT, 1 HR, GLUCOLA, EXTERNAL: 102
HCT, EXTERNAL: 38.2
HGB, EXTERNAL: 13.2
PLATELET CNT,   EXTERNAL: 241

## 2019-05-24 NOTE — PROGRESS NOTES
Administered 0.5mL TETANUS, DIPHTHERIA TOXOIDS AND ACELLULAR PERTUSSIS VACCINE (TDAP) per MD order. Patient consent signed by patient. Injection given IM in the L deltoid . Patient tolerated well, no complications, no side effects. Rhogam given to patient in L gluteal.  Injection given IM after her blood was drawn. I gave patient her card to keep in her wallet.

## 2019-05-24 NOTE — PROGRESS NOTES
Problem List  Date Reviewed: 5/22/2019          Codes Class Noted    Supervision of normal first pregnancy, antepartum ICD-10-CM: Z34.00  ICD-9-CM: V22.0  1/10/2019    Overview Addendum 3/20/2019  4:52 PM by Kale Hawkins April, LPN     Intrauterine pregnancy with the following problems identified:   [facility] 8/20/2019 by D=  Femara conception  Had flu vaccine  NIPTS being repeated 2/15 due to insuff fetal DNA - still insufficient  AFP/tetra--negative  RH neg  BMI>40             Obesity, morbid ([facility] [address] ICD-10-CM: E66.01  ICD-9-CM: 278.01  8/24/2018        At high risk for breast cancer ICD-10-CM: Z91.89  ICD-9-CM: V49.89  5/8/2017    Overview Signed 5/8/2017  4:03 PM by Christ Pressley risk 24.3 %             Dysmenorrhea ICD-10-CM: N94.6  ICD-9-CM: 625.3  Unknown        Family history of breast cancer ICD-10-CM: Z80.3  ICD-9-CM: V16.3  9/15/2014    Overview Addendum 5/8/2017  4:02 PM by Christ Easton     Mother diagnosed with breast cancer age 35(Mallory Estes ), maternal cousin, maternal aunt also with breast cancer all post menopausal. Her mother does not carry the BRCA gene.

## 2019-05-24 NOTE — PATIENT INSTRUCTIONS

## 2019-05-25 LAB
BLD GP AB SCN SERPL QL: NEGATIVE
ERYTHROCYTE [DISTWIDTH] IN BLOOD BY AUTOMATED COUNT: 14.2 % (ref 12.3–15.4)
GLUCOSE 1H P 50 G GLC PO SERPL-MCNC: 102 MG/DL (ref 65–139)
HCT VFR BLD AUTO: 38.2 % (ref 34–46.6)
HGB BLD-MCNC: 13.2 G/DL (ref 11.1–15.9)
MCH RBC QN AUTO: 31.8 PG (ref 26.6–33)
MCHC RBC AUTO-ENTMCNC: 34.6 G/DL (ref 31.5–35.7)
MCV RBC AUTO: 92 FL (ref 79–97)
PLATELET # BLD AUTO: 241 X10E3/UL (ref 150–450)
RBC # BLD AUTO: 4.15 X10E6/UL (ref 3.77–5.28)
WBC # BLD AUTO: 10.6 X10E3/UL (ref 3.4–10.8)

## 2019-05-28 ENCOUNTER — HOSPITAL ENCOUNTER (OUTPATIENT)
Dept: PERINATAL CARE | Age: 34
Discharge: HOME | End: 2019-05-28
Attending: OBSTETRICS & GYNECOLOGY
Payer: COMMERCIAL

## 2019-05-28 PROCEDURE — 76816 OB US FOLLOW-UP PER FETUS: CPT | Performed by: OBSTETRICS & GYNECOLOGY

## 2019-05-29 DIAGNOSIS — Z34.00 SUPERVISION OF NORMAL FIRST PREGNANCY, ANTEPARTUM: ICD-10-CM

## 2019-06-10 ENCOUNTER — ROUTINE PRENATAL (OUTPATIENT)
Dept: OBGYN CLINIC | Age: 34
End: 2019-06-10

## 2019-06-10 VITALS — WEIGHT: 278 LBS | SYSTOLIC BLOOD PRESSURE: 136 MMHG | BODY MASS INDEX: 42.27 KG/M2 | DIASTOLIC BLOOD PRESSURE: 86 MMHG

## 2019-06-10 DIAGNOSIS — Z34.00 SUPERVISION OF NORMAL FIRST PREGNANCY, ANTEPARTUM: Primary | ICD-10-CM

## 2019-06-10 NOTE — PROGRESS NOTES
Problem List  Date Reviewed: 5/24/2019          Codes Class Noted    Supervision of normal first pregnancy, antepartum ICD-10-CM: Z34.00  ICD-9-CM: V22.0  1/10/2019    Overview Addendum 5/24/2019  1:57 PM by Joellen Hdz LPN     Intrauterine pregnancy with the following problems identified:   [facility] 8/20/2019 by D=US  Femara conception  Had flu vaccine  NIPTS being repeated 2/15 due to insuff fetal DNA - still insufficient  AFP/tetra--negative  RH neg-rhogam given 5/24/19  BMI>40  Tdap given 5/24/19             Obesity, morbid ([facility] [address] ICD-10-CM: E66.01  ICD-9-CM: 278.01  8/24/2018        At high risk for breast cancer ICD-10-CM: Z91.89  ICD-9-CM: V49.89  5/8/2017    Overview Signed 5/8/2017  4:03 PM by Teryl Flash     Wendie risk 24.3 %             Dysmenorrhea ICD-10-CM: N94.6  ICD-9-CM: 625.3  Unknown        Family history of breast cancer ICD-10-CM: Z80.3  ICD-9-CM: V16.3  9/15/2014    Overview Addendum 5/8/2017  4:02 PM by Teryl Flash     Mother diagnosed with breast cancer age 35(Mallory Barajas ), maternal cousin, maternal aunt also with breast cancer all post menopausal. Her mother does not carry the BRCA gene.

## 2019-06-21 ENCOUNTER — ROUTINE PRENATAL (OUTPATIENT)
Dept: OBGYN CLINIC | Age: 34
End: 2019-06-21

## 2019-06-21 VITALS — SYSTOLIC BLOOD PRESSURE: 124 MMHG | BODY MASS INDEX: 42.57 KG/M2 | DIASTOLIC BLOOD PRESSURE: 74 MMHG | WEIGHT: 280 LBS

## 2019-06-21 DIAGNOSIS — Z34.00 SUPERVISION OF NORMAL FIRST PREGNANCY, ANTEPARTUM: Primary | ICD-10-CM

## 2019-06-21 NOTE — PATIENT INSTRUCTIONS

## 2019-06-21 NOTE — PROGRESS NOTES
LIMITED OB SCAN  A SINGLE VERTEX 31W3D IUP IS SEEN. FETAL CARDIAC MOTION OBSERVED. LIMITED ANATOMY WAS VISUALIZED AND APPEARS WNL. APPROPRIATE FETAL GROWTH IS SEEN. SIZE = DATES. JULIAN AND PLACENTA APPEAR WNL. Problem List  Date Reviewed: 6/10/2019          Codes Class Noted    Supervision of normal first pregnancy, antepartum ICD-10-CM: Z34.00  ICD-9-CM: V22.0  1/10/2019    Overview Addendum 5/24/2019  1:57 PM by Joellen Flowers LPN     Intrauterine pregnancy with the following problems identified:   [facility] 8/20/2019 by D=US  Femara conception  Had flu vaccine  NIPTS being repeated 2/15 due to insuff fetal DNA - still insufficient  AFP/tetra--negative  RH neg-rhogam given 5/24/19  BMI>40  Tdap given 5/24/19             Obesity, morbid ([facility] [address] ICD-10-CM: E66.01  ICD-9-CM: 278.01  8/24/2018        At high risk for breast cancer ICD-10-CM: Z91.89  ICD-9-CM: V49.89  5/8/2017    Overview Signed 5/8/2017  4:03 PM by Melisa Pressley risk 24.3 %             Dysmenorrhea ICD-10-CM: N94.6  ICD-9-CM: 625.3  Unknown        Family history of breast cancer ICD-10-CM: Z80.3  ICD-9-CM: V16.3  9/15/2014    Overview Addendum 5/8/2017  4:02 PM by Melisa Campbell     Mother diagnosed with breast cancer age 35(Mallory Weldon ), maternal cousin, maternal aunt also with breast cancer all post menopausal. Her mother does not carry the BRCA gene.

## 2019-07-05 ENCOUNTER — ROUTINE PRENATAL (OUTPATIENT)
Dept: OBGYN CLINIC | Age: 34
End: 2019-07-05

## 2019-07-05 VITALS — DIASTOLIC BLOOD PRESSURE: 75 MMHG | BODY MASS INDEX: 43.03 KG/M2 | WEIGHT: 283 LBS | SYSTOLIC BLOOD PRESSURE: 123 MMHG

## 2019-07-05 DIAGNOSIS — Z34.00 SUPERVISION OF NORMAL FIRST PREGNANCY, ANTEPARTUM: Primary | ICD-10-CM

## 2019-07-05 NOTE — PROGRESS NOTES
Problem List  Date Reviewed: 6/21/2019          Codes Class Noted    Supervision of normal first pregnancy, antepartum ICD-10-CM: Z34.00  ICD-9-CM: V22.0  1/10/2019    Overview Addendum 5/24/2019  1:57 PM by Joellen Moran LPN     Intrauterine pregnancy with the following problems identified:   [address] 8/20/2019 by D=US  Femara conception  Had flu vaccine  NIPTS being repeated 2/15 due to insuff fetal DNA - still insufficient  AFP/tetra--negative  RH neg-rhogam given 5/24/19  BMI>40  Tdap given 5/24/19             Obesity, morbid ([facility] [address] ICD-10-CM: E66.01  ICD-9-CM: 278.01  8/24/2018        At high risk for breast cancer ICD-10-CM: Z91.89  ICD-9-CM: V49.89  5/8/2017    Overview Signed 5/8/2017  4:03 PM by Kia Pressley risk 24.3 %             Dysmenorrhea ICD-10-CM: N94.6  ICD-9-CM: 625.3  Unknown        Family history of breast cancer ICD-10-CM: Z80.3  ICD-9-CM: V16.3  9/15/2014    Overview Addendum 5/8/2017  4:02 PM by Kia Mcdonald     Mother diagnosed with breast cancer age 35(Mallory Hernandez ), maternal cousin, maternal aunt also with breast cancer all post menopausal. Her mother does not carry the BRCA gene.

## 2019-07-05 NOTE — PATIENT INSTRUCTIONS
Weeks 32 to 34 of Your Pregnancy: Care Instructions  Your Care Instructions    During the last few weeks of your pregnancy, you may have more aches and pains. It's important to rest when you can. Your growing baby is putting more pressure on your bladder. So you may need to urinate more often. Hemorrhoids are also common. These are painful, itchy veins in the rectal area. In the 36th week, most women have a test for group B streptococcus (GBS). GBS is a common bacteria that can live in the vagina and rectum. It can make your baby sick after birth. If you test positive, you will get antibiotics during labor. These will keep your baby from getting the bacteria. You may want to talk with your doctor about banking your baby's umbilical cord blood. This is the blood left in the cord after birth. If you want to save this blood, you must arrange it ahead of time. You can't decide at the last minute. If you haven't already had the Tdap shot during this pregnancy, talk to your doctor about getting it. It will help protect your  against pertussis infection. Follow-up care is a key part of your treatment and safety. Be sure to make and go to all appointments, and call your doctor if you are having problems. It's also a good idea to know your test results and keep a list of the medicines you take. How can you care for yourself at home? Ease hemorrhoids  · Get more liquids, fruits, vegetables, and fiber in your diet. This will help keep your stools soft. · Avoid sitting for too long. Lie on your left side several times a day. · Clean yourself with soft, moist toilet paper. Or you can use witch hazel pads or personal hygiene pads. · If you are uncomfortable, try ice packs. Or you can sit in a warm sitz bath. Do these for 20 minutes at a time, as needed. · Use hydrocortisone cream for pain and itching. Two examples are Anusol and Preparation H Hydrocortisone.   · Ask your doctor about taking an over-the-counter stool softener. Consider breastfeeding  · Experts recommend that women breastfeed for 1 year or longer. Breast milk is the perfect food for babies. · Breast milk is easier for babies to digest than formula. And it is always available, just the right temperature, and free. · Breast milk may help protect your child from some health problems.  babies are less likely than formula-fed babies to:  ? Get ear infections, colds, diarrhea, and pneumonia. ? Be obese or get diabetes later in life. · Women who breastfeed have less bleeding after the birth. Their uteruses also shrink back faster. · Some women who breastfeed lose weight faster. Making milk burns calories. · Breastfeeding can lower your risk of breast cancer, ovarian cancer, and osteoporosis. Decide about circumcision for boys  · As you make this decision, it may help to think about your personal, [employer], and family traditions. You get to decide if you will keep your son's penis natural or if he will be circumcised. · If you decide that you would like to have your baby circumcised, talk with your doctor. You can share your concerns about pain. And you can discuss your preferences for anesthesia. Where can you learn more? Go to http://jared-leia.info/. Enter U475 in the search box to learn more about \"Weeks 32 to 34 of Your Pregnancy: Care Instructions. \"  Current as of: September 5, 2018  Content Version: 11.9  © 2992-7568 [employer]. Care instructions adapted under license by [employer] (which disclaims liability or warranty for this information). If you have questions about a medical condition or this instruction, always ask your healthcare professional. [place] [address] any warranty or liability for your use of this information.

## 2019-07-23 ENCOUNTER — ROUTINE PRENATAL (OUTPATIENT)
Dept: OBGYN CLINIC | Age: 34
End: 2019-07-23

## 2019-07-23 VITALS — WEIGHT: 286 LBS | BODY MASS INDEX: 43.49 KG/M2 | SYSTOLIC BLOOD PRESSURE: 147 MMHG | DIASTOLIC BLOOD PRESSURE: 77 MMHG

## 2019-07-23 DIAGNOSIS — Z34.00 SUPERVISION OF NORMAL FIRST PREGNANCY, ANTEPARTUM: Primary | ICD-10-CM

## 2019-07-23 LAB — GRBS, EXTERNAL: POSITIVE

## 2019-07-23 NOTE — PROGRESS NOTES
Problem List  Date Reviewed: 7/5/2019          Codes Class Noted    Supervision of normal first pregnancy, antepartum ICD-10-CM: Z34.00  ICD-9-CM: V22.0  1/10/2019    Overview Addendum 5/24/2019  1:57 PM by Joellen Webster LPN     Intrauterine pregnancy with the following problems identified:   [facility] 8/20/2019 by D=  Femara conception  Had flu vaccine  NIPTS being repeated 2/15 due to insuff fetal DNA - still insufficient  AFP/tetra--negative  RH neg-rhogam given 5/24/19  BMI>40  Tdap given 5/24/19             Obesity, morbid ([facility] [address] ICD-10-CM: E66.01  ICD-9-CM: 278.01  8/24/2018        At high risk for breast cancer ICD-10-CM: Z91.89  ICD-9-CM: V49.89  5/8/2017    Overview Signed 5/8/2017  4:03 PM by Carol Pressley risk 24.3 %             Dysmenorrhea ICD-10-CM: N94.6  ICD-9-CM: 625.3  Unknown        Family history of breast cancer ICD-10-CM: Z80.3  ICD-9-CM: V16.3  9/15/2014    Overview Addendum 5/8/2017  4:02 PM by Carol Navarro     Mother diagnosed with breast cancer age 35(Mallory Cook ), maternal cousin, maternal aunt also with breast cancer all post menopausal. Her mother does not carry the BRCA gene.

## 2019-07-23 NOTE — PATIENT INSTRUCTIONS

## 2019-07-28 LAB
GP B STREP DNA SPEC QL NAA+PROBE: POSITIVE
ORGANISM IDENTIFICATION, 188761: NORMAL

## 2019-08-01 DIAGNOSIS — Z34.00 SUPERVISION OF NORMAL FIRST PREGNANCY, ANTEPARTUM: ICD-10-CM

## 2019-08-02 ENCOUNTER — ROUTINE PRENATAL (OUTPATIENT)
Dept: OBGYN CLINIC | Age: 34
End: 2019-08-02

## 2019-08-02 VITALS — DIASTOLIC BLOOD PRESSURE: 79 MMHG | WEIGHT: 287.4 LBS | BODY MASS INDEX: 43.7 KG/M2 | SYSTOLIC BLOOD PRESSURE: 140 MMHG

## 2019-08-02 DIAGNOSIS — Z34.00 SUPERVISION OF NORMAL FIRST PREGNANCY, ANTEPARTUM: Primary | ICD-10-CM

## 2019-08-02 DIAGNOSIS — O99.210 OBESITY IN PREGNANCY, ANTEPARTUM: ICD-10-CM

## 2019-08-02 NOTE — PROGRESS NOTES
Doing well. Baby moving.  GBS pos- resistant to clinda - gets hives with PCN - will use Ancef  Cervix barely 1

## 2019-08-02 NOTE — PROGRESS NOTES
Problem List  Date Reviewed: 7/23/2019          Codes Class Noted    Supervision of normal first pregnancy, antepartum ICD-10-CM: Z34.00  ICD-9-CM: V22.0  1/10/2019    Overview Addendum 8/1/2019  7:55 AM by Twila Lobo LPN     Intrauterine pregnancy with the following problems identified:   [facility] 8/20/2019 by D=US  Femara conception  Had flu vaccine  NIPTS being repeated 2/15 due to insuff fetal DNA - still insufficient  AFP/tetra--negative  RH neg-rhogam given 5/24/19  BMI>40  Tdap given 5/24/19  GBS resistant to clind - Ancef/Vanc-GBS Positive             Obesity, morbid ([facility] [address] ICD-10-CM: E66.01  ICD-9-CM: 278.01  8/24/2018        At high risk for breast cancer ICD-10-CM: Z91.89  ICD-9-CM: V49.89  5/8/2017    Overview Signed 5/8/2017  4:03 PM by Anai PratherNicole risk 24.3 %             Dysmenorrhea ICD-10-CM: N94.6  ICD-9-CM: 625.3  Unknown        Family history of breast cancer ICD-10-CM: Z80.3  ICD-9-CM: V16.3  9/15/2014    Overview Addendum 5/8/2017  4:02 PM by Anai Ernandez     Mother diagnosed with breast cancer age 35(Mallory Clifford ), maternal cousin, maternal aunt also with breast cancer all post menopausal. Her mother does not carry the BRCA gene.

## 2019-08-09 ENCOUNTER — ROUTINE PRENATAL (OUTPATIENT)
Dept: OBGYN CLINIC | Age: 34
End: 2019-08-09

## 2019-08-09 VITALS
DIASTOLIC BLOOD PRESSURE: 80 MMHG | HEART RATE: 74 BPM | SYSTOLIC BLOOD PRESSURE: 144 MMHG | WEIGHT: 288.2 LBS | HEIGHT: 68 IN | BODY MASS INDEX: 43.68 KG/M2

## 2019-08-09 DIAGNOSIS — O13.9 GESTATIONAL HYPERTENSION, ANTEPARTUM: ICD-10-CM

## 2019-08-09 DIAGNOSIS — Z34.00 SUPERVISION OF NORMAL FIRST PREGNANCY, ANTEPARTUM: Primary | ICD-10-CM

## 2019-08-09 NOTE — PATIENT INSTRUCTIONS

## 2019-08-09 NOTE — PROGRESS NOTES
Problem List  Date Reviewed: 8/2/2019          Codes Class Noted    Supervision of normal first pregnancy, antepartum ICD-10-CM: Z34.00  ICD-9-CM: V22.0  1/10/2019    Overview Addendum 8/1/2019  7:55 AM by Sampson Dill LPN     Intrauterine pregnancy with the following problems identified:   [facility] 8/20/2019 by D=US  Femara conception  Had flu vaccine  NIPTS being repeated 2/15 due to insuff fetal DNA - still insufficient  AFP/tetra--negative  RH neg-rhogam given 5/24/19  BMI>40  Tdap given 5/24/19  GBS resistant to clind - Ancef/Vanc-GBS Positive             Obesity, morbid ([facility] [address] ICD-10-CM: E66.01  ICD-9-CM: 278.01  8/24/2018        At high risk for breast cancer ICD-10-CM: Z91.89  ICD-9-CM: V49.89  5/8/2017    Overview Signed 5/8/2017  4:03 PM by Kia Pressley risk 24.3 %             Dysmenorrhea ICD-10-CM: N94.6  ICD-9-CM: 625.3  Unknown        Family history of breast cancer ICD-10-CM: Z80.3  ICD-9-CM: V16.3  9/15/2014    Overview Addendum 5/8/2017  4:02 PM by Kia Mcdonald     Mother diagnosed with breast cancer age 35(Mallory Hernandez ), maternal cousin, maternal aunt also with breast cancer all post menopausal. Her mother does not carry the BRCA gene.

## 2019-08-09 NOTE — PROGRESS NOTES
Baby moving  Cervix L/1  BP has been elevated last 3 visits - no protein  IOL next week for [facility]   Check labs today and urine ratio

## 2019-08-10 LAB
ALBUMIN SERPL-MCNC: 3.4 G/DL (ref 3.5–5.5)
ALBUMIN/GLOB SERPL: 1.4 {RATIO} (ref 1.2–2.2)
ALP SERPL-CCNC: 131 IU/L (ref 39–117)
ALT SERPL-CCNC: 8 IU/L (ref 0–32)
AST SERPL-CCNC: 10 IU/L (ref 0–40)
BILIRUB SERPL-MCNC: 0.2 MG/DL (ref 0–1.2)
BUN SERPL-MCNC: 8 MG/DL (ref 6–20)
BUN/CREAT SERPL: 11 (ref 9–23)
CALCIUM SERPL-MCNC: 8.8 MG/DL (ref 8.7–10.2)
CHLORIDE SERPL-SCNC: 102 MMOL/L (ref 96–106)
CO2 SERPL-SCNC: 19 MMOL/L (ref 20–29)
CREAT SERPL-MCNC: 0.7 MG/DL (ref 0.57–1)
CREAT UR-MCNC: 86.5 MG/DL
ERYTHROCYTE [DISTWIDTH] IN BLOOD BY AUTOMATED COUNT: 14.4 % (ref 12.3–15.4)
GLOBULIN SER CALC-MCNC: 2.5 G/DL (ref 1.5–4.5)
GLUCOSE SERPL-MCNC: 70 MG/DL (ref 65–99)
HCT VFR BLD AUTO: 37.4 % (ref 34–46.6)
HGB BLD-MCNC: 12.3 G/DL (ref 11.1–15.9)
MCH RBC QN AUTO: 30 PG (ref 26.6–33)
MCHC RBC AUTO-ENTMCNC: 32.9 G/DL (ref 31.5–35.7)
MCV RBC AUTO: 91 FL (ref 79–97)
PLATELET # BLD AUTO: 205 X10E3/UL (ref 150–450)
POTASSIUM SERPL-SCNC: 4.8 MMOL/L (ref 3.5–5.2)
PROT SERPL-MCNC: 5.9 G/DL (ref 6–8.5)
PROT UR-MCNC: 13.9 MG/DL
PROT/CREAT UR: 161 MG/G CREAT (ref 0–200)
RBC # BLD AUTO: 4.1 X10E6/UL (ref 3.77–5.28)
SODIUM SERPL-SCNC: 134 MMOL/L (ref 134–144)
WBC # BLD AUTO: 8.7 X10E3/UL (ref 3.4–10.8)

## 2019-08-13 ENCOUNTER — HOSPITAL ENCOUNTER (INPATIENT)
Age: 34
LOS: 4 days | Discharge: HOME | End: 2019-08-17
Attending: OBSTETRICS & GYNECOLOGY | Admitting: OBSTETRICS & GYNECOLOGY
Payer: COMMERCIAL

## 2019-08-13 ENCOUNTER — ROUTINE PRENATAL (OUTPATIENT)
Dept: OBGYN CLINIC | Age: 34
End: 2019-08-13

## 2019-08-13 VITALS — SYSTOLIC BLOOD PRESSURE: 145 MMHG | BODY MASS INDEX: 43.49 KG/M2 | DIASTOLIC BLOOD PRESSURE: 86 MMHG | WEIGHT: 286 LBS

## 2019-08-13 DIAGNOSIS — O13.9 GESTATIONAL HYPERTENSION, ANTEPARTUM: ICD-10-CM

## 2019-08-13 DIAGNOSIS — G89.18 POST-OP PAIN: Primary | ICD-10-CM

## 2019-08-13 DIAGNOSIS — Z3A.39 39 WEEKS GESTATION OF PREGNANCY: ICD-10-CM

## 2019-08-13 DIAGNOSIS — Z34.00 SUPERVISION OF NORMAL FIRST PREGNANCY, ANTEPARTUM: Primary | ICD-10-CM

## 2019-08-13 PROBLEM — Z34.90 PREGNANCY: Status: ACTIVE | Noted: 2019-08-13

## 2019-08-13 LAB
ALBUMIN SERPL-MCNC: 2.7 G/DL (ref 3.5–5)
ALBUMIN/GLOB SERPL: 0.7 {RATIO} (ref 1.1–2.2)
ALP SERPL-CCNC: 146 U/L (ref 45–117)
ALT SERPL-CCNC: 13 U/L (ref 12–78)
ANION GAP SERPL CALC-SCNC: 9 MMOL/L (ref 5–15)
AST SERPL-CCNC: 15 U/L (ref 15–37)
BASOPHILS # BLD: 0 K/UL (ref 0–0.1)
BASOPHILS NFR BLD: 0 % (ref 0–1)
BILIRUB SERPL-MCNC: 0.2 MG/DL (ref 0.2–1)
BUN SERPL-MCNC: 11 MG/DL (ref 6–20)
BUN/CREAT SERPL: 13 (ref 12–20)
CALCIUM SERPL-MCNC: 8.5 MG/DL (ref 8.5–10.1)
CHLORIDE SERPL-SCNC: 107 MMOL/L (ref 97–108)
CO2 SERPL-SCNC: 23 MMOL/L (ref 21–32)
CREAT SERPL-MCNC: 0.83 MG/DL (ref 0.55–1.02)
CREAT UR-MCNC: 257 MG/DL
DIFFERENTIAL METHOD BLD: ABNORMAL
EOSINOPHIL # BLD: 0 K/UL (ref 0–0.4)
EOSINOPHIL NFR BLD: 0 % (ref 0–7)
ERYTHROCYTE [DISTWIDTH] IN BLOOD BY AUTOMATED COUNT: 13.5 % (ref 11.5–14.5)
GLOBULIN SER CALC-MCNC: 3.7 G/DL (ref 2–4)
GLUCOSE SERPL-MCNC: 75 MG/DL (ref 65–100)
HCT VFR BLD AUTO: 38 % (ref 35–47)
HGB BLD-MCNC: 12.8 G/DL (ref 11.5–16)
IMM GRANULOCYTES # BLD AUTO: 0.1 K/UL (ref 0–0.04)
IMM GRANULOCYTES NFR BLD AUTO: 1 % (ref 0–0.5)
LYMPHOCYTES # BLD: 2.1 K/UL (ref 0.8–3.5)
LYMPHOCYTES NFR BLD: 22 % (ref 12–49)
MCH RBC QN AUTO: 29.7 PG (ref 26–34)
MCHC RBC AUTO-ENTMCNC: 33.7 G/DL (ref 30–36.5)
MCV RBC AUTO: 88.2 FL (ref 80–99)
MONOCYTES # BLD: 0.8 K/UL (ref 0–1)
MONOCYTES NFR BLD: 8 % (ref 5–13)
NEUTS SEG # BLD: 6.8 K/UL (ref 1.8–8)
NEUTS SEG NFR BLD: 69 % (ref 32–75)
NRBC # BLD: 0 K/UL (ref 0–0.01)
NRBC BLD-RTO: 0 PER 100 WBC
PLATELET # BLD AUTO: 207 K/UL (ref 150–400)
PMV BLD AUTO: 11.2 FL (ref 8.9–12.9)
POTASSIUM SERPL-SCNC: 4.4 MMOL/L (ref 3.5–5.1)
PROT SERPL-MCNC: 6.4 G/DL (ref 6.4–8.2)
PROT UR-MCNC: 21 MG/DL (ref 0–11.9)
PROT/CREAT UR-RTO: 0.1
RBC # BLD AUTO: 4.31 M/UL (ref 3.8–5.2)
SODIUM SERPL-SCNC: 139 MMOL/L (ref 136–145)
WBC # BLD AUTO: 9.8 K/UL (ref 3.6–11)

## 2019-08-13 PROCEDURE — 75410000002 HC LABOR FEE PER 1 HR: Performed by: OBSTETRICS & GYNECOLOGY

## 2019-08-13 PROCEDURE — 36415 COLL VENOUS BLD VENIPUNCTURE: CPT

## 2019-08-13 PROCEDURE — 59200 INSERT CERVICAL DILATOR: CPT | Performed by: OBSTETRICS & GYNECOLOGY

## 2019-08-13 PROCEDURE — 84156 ASSAY OF PROTEIN URINE: CPT

## 2019-08-13 PROCEDURE — 75410000000 HC DELIVERY VAGINAL/SINGLE: Performed by: OBSTETRICS & GYNECOLOGY

## 2019-08-13 PROCEDURE — 74011250637 HC RX REV CODE- 250/637: Performed by: OBSTETRICS & GYNECOLOGY

## 2019-08-13 PROCEDURE — 80053 COMPREHEN METABOLIC PANEL: CPT

## 2019-08-13 PROCEDURE — 77010026065 HC OXYGEN MINIMUM MEDICAL AIR: Performed by: OBSTETRICS & GYNECOLOGY

## 2019-08-13 PROCEDURE — 76060000078 HC EPIDURAL ANESTHESIA: Performed by: ANESTHESIOLOGY

## 2019-08-13 PROCEDURE — 4A0HXCZ MEASUREMENT OF PRODUCTS OF CONCEPTION, CARDIAC RATE, EXTERNAL APPROACH: ICD-10-PCS | Performed by: OBSTETRICS & GYNECOLOGY

## 2019-08-13 PROCEDURE — 74011000258 HC RX REV CODE- 258: Performed by: OBSTETRICS & GYNECOLOGY

## 2019-08-13 PROCEDURE — 75410000003 HC RECOV DEL/VAG/CSECN EA 0.5 HR: Performed by: OBSTETRICS & GYNECOLOGY

## 2019-08-13 PROCEDURE — 85025 COMPLETE CBC W/AUTO DIFF WBC: CPT

## 2019-08-13 PROCEDURE — 74011250636 HC RX REV CODE- 250/636: Performed by: OBSTETRICS & GYNECOLOGY

## 2019-08-13 PROCEDURE — 65270000029 HC RM PRIVATE

## 2019-08-13 RX ORDER — NALBUPHINE HYDROCHLORIDE 10 MG/ML
10 INJECTION, SOLUTION INTRAMUSCULAR; INTRAVENOUS; SUBCUTANEOUS
Status: DISCONTINUED | OUTPATIENT
Start: 2019-08-13 | End: 2019-08-14 | Stop reason: HOSPADM

## 2019-08-13 RX ORDER — ONDANSETRON 2 MG/ML
4 INJECTION INTRAMUSCULAR; INTRAVENOUS
Status: DISCONTINUED | OUTPATIENT
Start: 2019-08-13 | End: 2019-08-14 | Stop reason: HOSPADM

## 2019-08-13 RX ORDER — LIDOCAINE HYDROCHLORIDE 10 MG/ML
20 INJECTION INFILTRATION; PERINEURAL ONCE
Status: ACTIVE | OUTPATIENT
Start: 2019-08-13 | End: 2019-08-14

## 2019-08-13 RX ORDER — SODIUM CHLORIDE, SODIUM LACTATE, POTASSIUM CHLORIDE, CALCIUM CHLORIDE 600; 310; 30; 20 MG/100ML; MG/100ML; MG/100ML; MG/100ML
125 INJECTION, SOLUTION INTRAVENOUS CONTINUOUS
Status: DISCONTINUED | OUTPATIENT
Start: 2019-08-13 | End: 2019-08-14

## 2019-08-13 RX ORDER — NALOXONE HYDROCHLORIDE 0.4 MG/ML
0.4 INJECTION, SOLUTION INTRAMUSCULAR; INTRAVENOUS; SUBCUTANEOUS AS NEEDED
Status: DISCONTINUED | OUTPATIENT
Start: 2019-08-13 | End: 2019-08-14 | Stop reason: HOSPADM

## 2019-08-13 RX ORDER — SODIUM CHLORIDE 0.9 % (FLUSH) 0.9 %
5-40 SYRINGE (ML) INJECTION EVERY 8 HOURS
Status: DISCONTINUED | OUTPATIENT
Start: 2019-08-13 | End: 2019-08-14

## 2019-08-13 RX ORDER — ZOLPIDEM TARTRATE 5 MG/1
5 TABLET ORAL
Status: DISCONTINUED | OUTPATIENT
Start: 2019-08-13 | End: 2019-08-14

## 2019-08-13 RX ORDER — OXYTOCIN/0.9 % SODIUM CHLORIDE 30/500 ML
0-25 PLASTIC BAG, INJECTION (ML) INTRAVENOUS
Status: DISCONTINUED | OUTPATIENT
Start: 2019-08-14 | End: 2019-08-14

## 2019-08-13 RX ORDER — MAG HYDROX/ALUMINUM HYD/SIMETH 200-200-20
30 SUSPENSION, ORAL (FINAL DOSE FORM) ORAL
Status: DISCONTINUED | OUTPATIENT
Start: 2019-08-13 | End: 2019-08-14 | Stop reason: HOSPADM

## 2019-08-13 RX ORDER — SODIUM CHLORIDE 0.9 % (FLUSH) 0.9 %
5-40 SYRINGE (ML) INJECTION AS NEEDED
Status: DISCONTINUED | OUTPATIENT
Start: 2019-08-13 | End: 2019-08-14

## 2019-08-13 RX ADMIN — CEFAZOLIN 1 G: 1 INJECTION, POWDER, FOR SOLUTION INTRAMUSCULAR; INTRAVENOUS at 22:51

## 2019-08-13 RX ADMIN — ACETAMINOPHEN 650 MG: 650 SOLUTION ORAL at 20:05

## 2019-08-13 RX ADMIN — CEFAZOLIN 1 G: 1 INJECTION, POWDER, FOR SOLUTION INTRAMUSCULAR; INTRAVENOUS at 17:30

## 2019-08-13 RX ADMIN — ZOLPIDEM TARTRATE 5 MG: 5 TABLET ORAL at 20:36

## 2019-08-13 RX ADMIN — ONDANSETRON 4 MG: 2 INJECTION INTRAMUSCULAR; INTRAVENOUS at 20:05

## 2019-08-13 RX ADMIN — SODIUM CHLORIDE, SODIUM LACTATE, POTASSIUM CHLORIDE, AND CALCIUM CHLORIDE 125 ML/HR: 600; 310; 30; 20 INJECTION, SOLUTION INTRAVENOUS at 17:34

## 2019-08-13 NOTE — H&P
History & Physical    Name: Brittney Maddox MRN: [id number]  SSN: [id number]    YOB: 1985  Age: 30 y.o. Sex: female        Subjective:     Estimated Date of Delivery: 19  OB History        1    Para   0    Term   0       0    AB   0    Living   0       SAB   0    TAB   0    Ectopic   0    Molar        Multiple   0    Live Births              Obstetric Comments   Menarche:  17. LMP: 2014. # of Children:  n/a. Age at Delivery of First Child:  n/a. Hysterectomy/oophorectomy:  NO/NO. Breast Bx no. Hx of Breast Feeding:  n/a. BCP:  yes. Hormone therapy:  no.             Ms. Adriane Rivero is admitted with pregnancy at 39w0d for induction of labor. Prenatal course was complicated by gestational hypertension. Please see prenatal records for details. Has cook cath placed in office. Will leave until am. Start pitocin at 4am    Past Medical History:   Diagnosis Date    Dysmenorrhea     Dr. Baeza January Family history of breast cancer     mother age 28. seeing Dr. Justin Hawkins    Gestational hypertension     past three weeks    HPV vaccine counseling     gardasil series complete    Knee pain, chronic     softball injuries. cartillage damage    Patellofemoral arthralgia of both knees     hx of rowing, catching     History reviewed. No pertinent surgical history.   Social History     Occupational History    Occupation:      Employer: [employer] [address] Occupation:      Employer: SELF EMPLOYED   Tobacco Use    Smoking status: Never Smoker    Smokeless tobacco: Never Used   Substance and Sexual Activity    Alcohol use: Not Currently     Comment: 2-3 drinks per week    Drug use: No    Sexual activity: Yes     Partners: Male     Birth control/protection: None     Family History   Problem Relation Age of Onset    Hypertension Mother     Breast Cancer Mother 28    High Cholesterol Mother     Thyroid Disease Mother         nodule    Stroke Paternal Grandfather        Allergies   Allergen Reactions    Augmentin [Amoxicillin-Pot Clavulanate] Hives     8/13/19: Per RN pt has tolerated Keflex in the past    Bactrim [Sulfamethoprim] Hives    Penicillins Unable to Obtain     8/13/19: Per RN pt has tolerated Keflex in the past    Sulfamethoxazole-Trimethoprim Hives     Prior to Admission medications    Medication Sig Start Date End Date Taking? Authorizing Provider   PNV HY.89/UDICYAX fum/folic ac (PRENATAL PO) Take  by mouth. Yes Provider, Historical        Review of Systems: A comprehensive review of systems was negative except for that written in the HPI. Objective:     Vitals:  Vitals:    08/13/19 1630 08/13/19 1636 08/13/19 1640 08/13/19 1646   BP:       Pulse:       Resp:       Temp:       SpO2: 95% 94% 95% 94%        Physical Exam:  Heart: Regular rate and rhythm  Lung: clear to auscultation throughout lung fields, no wheezes, no rales, no rhonchi and normal respiratory effort  Abdomen: soft, nontender  Fundus: soft and non tender  Perineum: blood absent, amniotic fluid absent  Cervical Exam: 1 cm dilated    30% effaced    -3 station    Presenting Part: cephalic  Cervical Position: posterior  Consistency: Soft  Membranes:  Intact  Fetal Heart Rate: Reactive    Prenatal Labs:   Lab Results   Component Value Date/Time    Rubella, External immune 01/04/2019    GrBStrep, External Positive 07/23/2019    HBsAg, External neg 01/04/2019    HIV, External neg 01/04/2019    Gonorrhea, External neg 01/04/2019    Chlamydia, External neg 01/04/2019        Assessment/Plan:     Plan: Admit for induction of labor due to gestational hypertension. Labs normal except elevated Cr.  Group B Strep was positive, will treat prophylactically with ancef.     Signed By:  Rand Morataya MD     August 13, 2019

## 2019-08-13 NOTE — PROGRESS NOTES
1915 - Bedside and Verbal shift change report given to AMIE Pratt (oncoming nurse) by GRAHAM Mahoney and Aristides Cho, RN (offgoing nurse). Report included the following information SBAR, Kardex, Intake/Output, MAR and Recent Results.

## 2019-08-13 NOTE — PROGRESS NOTES
15:52- Pt arrived from office of MD Carmen for indux d/t GHTN. TORB for PIH labs, PCR, continuous monitoring, 1g ancef Q6hr to tx GBS +, pitocin to start at 04:00 8/14/19. Cook catheter placed in office. MD states 80cc in uterine balloon and 60cc in vaginal balloon. Pt may have nubain or stadol IV for pain PRN and ambien PRN sleep tonight. Regular diet until midnight then clear liquids only. This RN supervising GRAHAM Salazar RN who is also providing care for this pt while in orientation.

## 2019-08-13 NOTE — PROGRESS NOTES
Baby moving  For cook cath today - to L&D    Cervical Catheter insertion procedure note    Baron Wise is a ,  30 y.o. female who presents today far placement of a cervical catheter in the cervix for ripening. Her cervix is unfavorable and she is scheduled for induction tomorrow. She has elected to have a cervical catheter placement today. The risks, benefits and assets of the procedure were discussed. Her questions were answered. PROCEDURE: The vagina and cervix was prepped with Zephiran. A Cook catheter was placed through the cervix without difficulty. The uterine bulb was inflated with 80 cc of saline. The cervical balloon was inflated to 60 cc of saline. Bleeding was minimal. The patient's level of discomfort was minimal.   POST PROCEDURE: The patient tolerated the procedure well. There were no complications. She was was admitted as an outpatient for monitoring overnight.

## 2019-08-14 ENCOUNTER — ANESTHESIA EVENT (OUTPATIENT)
Dept: LABOR AND DELIVERY | Age: 34
End: 2019-08-14
Payer: COMMERCIAL

## 2019-08-14 ENCOUNTER — ANESTHESIA (OUTPATIENT)
Dept: LABOR AND DELIVERY | Age: 34
End: 2019-08-14
Payer: COMMERCIAL

## 2019-08-14 PROCEDURE — 77030040179 HC DEV DRSG WND PICO S&N -C

## 2019-08-14 PROCEDURE — 74011000258 HC RX REV CODE- 258: Performed by: OBSTETRICS & GYNECOLOGY

## 2019-08-14 PROCEDURE — 76060000078 HC EPIDURAL ANESTHESIA: Performed by: OBSTETRICS & GYNECOLOGY

## 2019-08-14 PROCEDURE — 77030018836 HC SOL IRR NACL ICUM -A

## 2019-08-14 PROCEDURE — 74011250636 HC RX REV CODE- 250/636: Performed by: NURSE ANESTHETIST, CERTIFIED REGISTERED

## 2019-08-14 PROCEDURE — 74011000250 HC RX REV CODE- 250: Performed by: ANESTHESIOLOGY

## 2019-08-14 PROCEDURE — 65270000029 HC RM PRIVATE

## 2019-08-14 PROCEDURE — 75410000002 HC LABOR FEE PER 1 HR: Performed by: OBSTETRICS & GYNECOLOGY

## 2019-08-14 PROCEDURE — 77030040361 HC SLV COMPR DVT MDII -B

## 2019-08-14 PROCEDURE — 74011250636 HC RX REV CODE- 250/636: Performed by: OBSTETRICS & GYNECOLOGY

## 2019-08-14 PROCEDURE — 10907ZC DRAINAGE OF AMNIOTIC FLUID, THERAPEUTIC FROM PRODUCTS OF CONCEPTION, VIA NATURAL OR ARTIFICIAL OPENING: ICD-10-PCS | Performed by: OBSTETRICS & GYNECOLOGY

## 2019-08-14 PROCEDURE — 74011250636 HC RX REV CODE- 250/636

## 2019-08-14 PROCEDURE — 75410000003 HC RECOV DEL/VAG/CSECN EA 0.5 HR: Performed by: OBSTETRICS & GYNECOLOGY

## 2019-08-14 PROCEDURE — 77030008459 HC STPLR SKN COOP -B

## 2019-08-14 PROCEDURE — 74011250637 HC RX REV CODE- 250/637: Performed by: OBSTETRICS & GYNECOLOGY

## 2019-08-14 PROCEDURE — 74011000250 HC RX REV CODE- 250: Performed by: NURSE ANESTHETIST, CERTIFIED REGISTERED

## 2019-08-14 PROCEDURE — 74011000250 HC RX REV CODE- 250: Performed by: STUDENT IN AN ORGANIZED HEALTH CARE EDUCATION/TRAINING PROGRAM

## 2019-08-14 PROCEDURE — 76010000392 HC C SECN EA ADDL 0.5 HR: Performed by: OBSTETRICS & GYNECOLOGY

## 2019-08-14 PROCEDURE — 3E033VJ INTRODUCTION OF OTHER HORMONE INTO PERIPHERAL VEIN, PERCUTANEOUS APPROACH: ICD-10-PCS | Performed by: OBSTETRICS & GYNECOLOGY

## 2019-08-14 PROCEDURE — 76010000391 HC C SECN FIRST 1 HR: Performed by: OBSTETRICS & GYNECOLOGY

## 2019-08-14 PROCEDURE — 77030018809 HC RETRCTR ALXSO DISP AMR -B

## 2019-08-14 RX ORDER — ONDANSETRON 2 MG/ML
4 INJECTION INTRAMUSCULAR; INTRAVENOUS
Status: DISCONTINUED | OUTPATIENT
Start: 2019-08-15 | End: 2019-08-15

## 2019-08-14 RX ORDER — HYDROCODONE BITARTRATE AND ACETAMINOPHEN 10; 325 MG/1; MG/1
1 TABLET ORAL
Status: DISCONTINUED | OUTPATIENT
Start: 2019-08-14 | End: 2019-08-17 | Stop reason: HOSPADM

## 2019-08-14 RX ORDER — IBUPROFEN 800 MG/1
800 TABLET ORAL EVERY 8 HOURS
Status: DISCONTINUED | OUTPATIENT
Start: 2019-08-14 | End: 2019-08-14

## 2019-08-14 RX ORDER — SODIUM CHLORIDE 9 MG/ML
75 INJECTION, SOLUTION INTRAVENOUS CONTINUOUS
Status: DISCONTINUED | OUTPATIENT
Start: 2019-08-14 | End: 2019-08-14

## 2019-08-14 RX ORDER — ONDANSETRON 2 MG/ML
4 INJECTION INTRAMUSCULAR; INTRAVENOUS
Status: COMPLETED | OUTPATIENT
Start: 2019-08-14 | End: 2019-08-14

## 2019-08-14 RX ORDER — ZOLPIDEM TARTRATE 5 MG/1
5 TABLET ORAL
Status: DISCONTINUED | OUTPATIENT
Start: 2019-08-14 | End: 2019-08-17 | Stop reason: HOSPADM

## 2019-08-14 RX ORDER — DOCUSATE SODIUM 100 MG/1
100 CAPSULE, LIQUID FILLED ORAL 2 TIMES DAILY
Status: DISCONTINUED | OUTPATIENT
Start: 2019-08-14 | End: 2019-08-17 | Stop reason: HOSPADM

## 2019-08-14 RX ORDER — LIDOCAINE HYDROCHLORIDE AND EPINEPHRINE 15; 5 MG/ML; UG/ML
INJECTION, SOLUTION EPIDURAL
Status: SHIPPED | OUTPATIENT
Start: 2019-08-14 | End: 2019-08-14

## 2019-08-14 RX ORDER — OXYTOCIN/RINGER'S LACTATE 20/1000 ML
125-500 PLASTIC BAG, INJECTION (ML) INTRAVENOUS CONTINUOUS
Status: DISCONTINUED | OUTPATIENT
Start: 2019-08-14 | End: 2019-08-17 | Stop reason: HOSPADM

## 2019-08-14 RX ORDER — HYDROCODONE BITARTRATE AND ACETAMINOPHEN 5; 325 MG/1; MG/1
1 TABLET ORAL
Status: DISCONTINUED | OUTPATIENT
Start: 2019-08-14 | End: 2019-08-17 | Stop reason: HOSPADM

## 2019-08-14 RX ORDER — SODIUM CHLORIDE, SODIUM LACTATE, POTASSIUM CHLORIDE, CALCIUM CHLORIDE 600; 310; 30; 20 MG/100ML; MG/100ML; MG/100ML; MG/100ML
125 INJECTION, SOLUTION INTRAVENOUS CONTINUOUS
Status: DISCONTINUED | OUTPATIENT
Start: 2019-08-14 | End: 2019-08-17 | Stop reason: HOSPADM

## 2019-08-14 RX ORDER — MORPHINE SULFATE 0.5 MG/ML
INJECTION, SOLUTION EPIDURAL; INTRATHECAL; INTRAVENOUS AS NEEDED
Status: DISCONTINUED | OUTPATIENT
Start: 2019-08-14 | End: 2019-08-14 | Stop reason: HOSPADM

## 2019-08-14 RX ORDER — IBUPROFEN 800 MG/1
800 TABLET ORAL EVERY 8 HOURS
Status: DISCONTINUED | OUTPATIENT
Start: 2019-08-15 | End: 2019-08-15

## 2019-08-14 RX ORDER — CEFAZOLIN SODIUM/WATER 2 G/20 ML
SYRINGE (ML) INTRAVENOUS
Status: DISCONTINUED
Start: 2019-08-14 | End: 2019-08-14

## 2019-08-14 RX ORDER — FENTANYL/BUPIVACAINE/NS/PF 2-1250MCG
1-16 PREFILLED PUMP RESERVOIR EPIDURAL CONTINUOUS
Status: DISCONTINUED | OUTPATIENT
Start: 2019-08-14 | End: 2019-08-14 | Stop reason: HOSPADM

## 2019-08-14 RX ORDER — OXYTOCIN 10 [USP'U]/ML
INJECTION, SOLUTION INTRAMUSCULAR; INTRAVENOUS AS NEEDED
Status: DISCONTINUED | OUTPATIENT
Start: 2019-08-14 | End: 2019-08-14 | Stop reason: HOSPADM

## 2019-08-14 RX ORDER — LIDOCAINE HYDROCHLORIDE AND EPINEPHRINE 20; 5 MG/ML; UG/ML
INJECTION, SOLUTION EPIDURAL; INFILTRATION; INTRACAUDAL; PERINEURAL AS NEEDED
Status: DISCONTINUED | OUTPATIENT
Start: 2019-08-14 | End: 2019-08-14 | Stop reason: HOSPADM

## 2019-08-14 RX ORDER — NALOXONE HYDROCHLORIDE 0.4 MG/ML
0.4 INJECTION, SOLUTION INTRAMUSCULAR; INTRAVENOUS; SUBCUTANEOUS AS NEEDED
Status: DISCONTINUED | OUTPATIENT
Start: 2019-08-14 | End: 2019-08-17 | Stop reason: HOSPADM

## 2019-08-14 RX ORDER — SODIUM CHLORIDE 0.9 % (FLUSH) 0.9 %
5-40 SYRINGE (ML) INJECTION AS NEEDED
Status: DISCONTINUED | OUTPATIENT
Start: 2019-08-14 | End: 2019-08-17 | Stop reason: HOSPADM

## 2019-08-14 RX ORDER — KETOROLAC TROMETHAMINE 30 MG/ML
30 INJECTION, SOLUTION INTRAMUSCULAR; INTRAVENOUS EVERY 6 HOURS
Status: DISCONTINUED | OUTPATIENT
Start: 2019-08-14 | End: 2019-08-15

## 2019-08-14 RX ORDER — DIPHENHYDRAMINE HYDROCHLORIDE 50 MG/ML
12.5 INJECTION, SOLUTION INTRAMUSCULAR; INTRAVENOUS
Status: DISCONTINUED | OUTPATIENT
Start: 2019-08-14 | End: 2019-08-17 | Stop reason: HOSPADM

## 2019-08-14 RX ORDER — SIMETHICONE 80 MG
80 TABLET,CHEWABLE ORAL 4 TIMES DAILY
Status: DISCONTINUED | OUTPATIENT
Start: 2019-08-14 | End: 2019-08-17 | Stop reason: HOSPADM

## 2019-08-14 RX ORDER — BUPIVACAINE HYDROCHLORIDE 7.5 MG/ML
INJECTION, SOLUTION EPIDURAL; RETROBULBAR AS NEEDED
Status: DISCONTINUED | OUTPATIENT
Start: 2019-08-14 | End: 2019-08-14 | Stop reason: HOSPADM

## 2019-08-14 RX ORDER — SODIUM CHLORIDE 0.9 % (FLUSH) 0.9 %
5-40 SYRINGE (ML) INJECTION EVERY 8 HOURS
Status: DISCONTINUED | OUTPATIENT
Start: 2019-08-14 | End: 2019-08-15

## 2019-08-14 RX ORDER — SODIUM CHLORIDE, SODIUM LACTATE, POTASSIUM CHLORIDE, CALCIUM CHLORIDE 600; 310; 30; 20 MG/100ML; MG/100ML; MG/100ML; MG/100ML
INJECTION, SOLUTION INTRAVENOUS
Status: DISCONTINUED | OUTPATIENT
Start: 2019-08-14 | End: 2019-08-14 | Stop reason: HOSPADM

## 2019-08-14 RX ORDER — ONDANSETRON 2 MG/ML
INJECTION INTRAMUSCULAR; INTRAVENOUS
Status: COMPLETED
Start: 2019-08-14 | End: 2019-08-14

## 2019-08-14 RX ADMIN — LIDOCAINE HYDROCHLORIDE,EPINEPHRINE BITARTRATE 5 ML: 20; .005 INJECTION, SOLUTION EPIDURAL; INFILTRATION; INTRACAUDAL; PERINEURAL at 15:45

## 2019-08-14 RX ADMIN — SODIUM CHLORIDE, POTASSIUM CHLORIDE, SODIUM LACTATE AND CALCIUM CHLORIDE: 600; 310; 30; 20 INJECTION, SOLUTION INTRAVENOUS at 15:36

## 2019-08-14 RX ADMIN — MORPHINE SULFATE 200 MCG: 0.5 INJECTION, SOLUTION EPIDURAL; INTRATHECAL; INTRAVENOUS at 15:57

## 2019-08-14 RX ADMIN — OXYTOCIN 30 UNITS: 10 INJECTION, SOLUTION INTRAMUSCULAR; INTRAVENOUS at 16:30

## 2019-08-14 RX ADMIN — SODIUM CHLORIDE 999 ML/HR: 900 INJECTION, SOLUTION INTRAVENOUS at 14:29

## 2019-08-14 RX ADMIN — SODIUM CHLORIDE, SODIUM LACTATE, POTASSIUM CHLORIDE, AND CALCIUM CHLORIDE 125 ML/HR: 600; 310; 30; 20 INJECTION, SOLUTION INTRAVENOUS at 03:10

## 2019-08-14 RX ADMIN — HYDROCODONE BITARTRATE AND ACETAMINOPHEN 1 TABLET: 10; 325 TABLET ORAL at 19:32

## 2019-08-14 RX ADMIN — SODIUM CHLORIDE, SODIUM LACTATE, POTASSIUM CHLORIDE, AND CALCIUM CHLORIDE 125 ML/HR: 600; 310; 30; 20 INJECTION, SOLUTION INTRAVENOUS at 15:00

## 2019-08-14 RX ADMIN — Medication 10 ML/HR: at 10:58

## 2019-08-14 RX ADMIN — LIDOCAINE HYDROCHLORIDE,EPINEPHRINE BITARTRATE 5 ML: 20; .005 INJECTION, SOLUTION EPIDURAL; INFILTRATION; INTRACAUDAL; PERINEURAL at 15:32

## 2019-08-14 RX ADMIN — LIDOCAINE HYDROCHLORIDE,EPINEPHRINE BITARTRATE 5 ML: 20; .005 INJECTION, SOLUTION EPIDURAL; INFILTRATION; INTRACAUDAL; PERINEURAL at 15:51

## 2019-08-14 RX ADMIN — CEFAZOLIN 1 G: 1 INJECTION, POWDER, FOR SOLUTION INTRAMUSCULAR; INTRAVENOUS at 11:11

## 2019-08-14 RX ADMIN — SODIUM CHLORIDE, SODIUM LACTATE, POTASSIUM CHLORIDE, AND CALCIUM CHLORIDE 999 ML/HR: 600; 310; 30; 20 INJECTION, SOLUTION INTRAVENOUS at 09:57

## 2019-08-14 RX ADMIN — CEFAZOLIN 3 G: 1 INJECTION, POWDER, FOR SOLUTION INTRAMUSCULAR; INTRAVENOUS; PARENTERAL at 16:10

## 2019-08-14 RX ADMIN — LIDOCAINE HYDROCHLORIDE,EPINEPHRINE BITARTRATE 5 ML: 20; .005 INJECTION, SOLUTION EPIDURAL; INFILTRATION; INTRACAUDAL; PERINEURAL at 15:39

## 2019-08-14 RX ADMIN — OXYTOCIN-SODIUM CHLORIDE 0.9% IV SOLN 30 UNIT/500ML 2 MILLI-UNITS/MIN: 30-0.9/5 SOLUTION at 04:45

## 2019-08-14 RX ADMIN — CEFAZOLIN 1 G: 1 INJECTION, POWDER, FOR SOLUTION INTRAMUSCULAR; INTRAVENOUS at 04:47

## 2019-08-14 RX ADMIN — BUPIVACAINE HYDROCHLORIDE 1 ML: 7.5 INJECTION, SOLUTION EPIDURAL; RETROBULBAR at 15:57

## 2019-08-14 RX ADMIN — ONDANSETRON 4 MG: 2 INJECTION INTRAMUSCULAR; INTRAVENOUS at 20:09

## 2019-08-14 RX ADMIN — SODIUM CHLORIDE, POTASSIUM CHLORIDE, SODIUM LACTATE AND CALCIUM CHLORIDE: 600; 310; 30; 20 INJECTION, SOLUTION INTRAVENOUS at 16:14

## 2019-08-14 RX ADMIN — PHENYLEPHRINE HYDROCHLORIDE 40 MCG: 10 INJECTION INTRAVENOUS at 16:22

## 2019-08-14 RX ADMIN — PHENYLEPHRINE HYDROCHLORIDE 40 MCG: 10 INJECTION INTRAVENOUS at 16:13

## 2019-08-14 RX ADMIN — LIDOCAINE HYDROCHLORIDE AND EPINEPHRINE 4.5 ML: 15; 5 INJECTION, SOLUTION EPIDURAL at 11:17

## 2019-08-14 RX ADMIN — Medication 125 ML/HR: at 18:00

## 2019-08-14 NOTE — OP NOTES
Operative Note    Name: [facility] Record Number: [id number]   YOB: 1985  Today's Date: 2019      Pre-operative Diagnosis: fetal intolerance of labor    Post-operative Diagnosis: fetal intolerance of labor, TLBMI    Operation: low transverse  section Procedure(s):   SECTION    Surgeon(s):  Nicolasa Gonzales MD    Anesthesia: Epidural/Spinal    Prophylactic Antibiotics: Ancef  DVT Prophylaxis: Sequential Compression Devices         Fetal Description: talley     Birth Information:   Information for the patient's :  Meghan Julian, Male [place] [[id number]]   Delivery of a 6 lb 13.4 oz (3.1 kg) male infant on 2019 at 4:28 PM. Apgars were 8  and 9 . Umbilical Cord: 3 Vessels     Umbilical Cord Events: Nuchal Cord Without Compressions     Placenta: Expressed removal with Normal;Intact appearance. Amniotic Fluid Volume: Moderate     Amniotic Fluid Description:  Clear      EBL: 630MF    Umbilical Cord: 3 vessels present    Placenta:  spontaneous and expressed    Specimens: none, cord pH sent           Complications:  none  Circ-1: Deven Sandoval RN  Circ-2: Jose Dinh RN  Scrub Tech-1: Candice Poster  Surg Asst-1: Chris Avelar    Procedure Detail:      After proper patient identification and consent, the patient was taken to the operating room, where spinal and epidural anesthesia was administered and found to be adequate. Thompson catheter had been placed using sterile technique. The patient was prepped and draped in the normal sterile fashion. The abdomen was entered using the Pfannenstiel technique. The peritoneum was entered bluntely well superior to the bladder without any apparent injury. An Kiko retractor was placed. Palpation revealed no bowel below the retractor. The bladder flap was created without difficulty. A low transverse uterine incision was made with the scalpel and extended with blunt finger dissection.  Amniotomy was performed and the fluid was medium amount clear. The babys head was then delivered atraumatically. The nose and mouth were suctioned. The cord was clamped and cut and the baby was handed off to Nursing staff in attendance. The placenta was expressed and it spontaneously delivered. The uterus was wiped clean with a moist lap pad and cleared of all clots and debris. The uterine incision was closed in 2 layers, first with a running locked suture of 0-Vicryl, then with an imbricated layer. Adequate hemostasis was noted. Both tubes and ovaries appeared normal. The pericolic gutters were then lavaged clean with normal saline. Good hemostasis was again reassured The Kiko retractor was removed. The fascia was closed with 1-PDS in a running fashion. Wilfred was placed. Good hemostasis was assured. The incision was lavaged clean and small bleeders were coagulated with the bovie. The subcutaneous tissue was reapproximated with plain gut suture. The skin was closed with absorbable staples. The patient tolerated the procedure well. Sponge, lap, and needle counts were correct times three and the patient and baby were taken to recovery/postpartum room in stable condition.     Radha Burk MD  August 14, 2019  5:17 PM

## 2019-08-14 NOTE — ANESTHESIA PROCEDURE NOTES
Epidural Block    Start time: 8/14/2019 10:50 AM  End time: 8/14/2019 11:05 AM  Performed by: Kasey Betancourt DO  Authorized by: Kasey Betancourt DO     Pre-Procedure  Indication: labor epidural    Preanesthetic Checklist: patient identified, risks and benefits discussed, anesthesia consent, patient being monitored, timeout performed and anesthesia consent      Epidural:   Patient position:  Seated  Prep region:  Lumbar  Prep: Betadine    Location:  L2-3    Needle and Epidural Catheter:   Needle Type:  Tuohy  Needle Gauge:  17 G  Injection Technique:  Loss of resistance using saline  Attempts:  1  Catheter Size:  20 G  Catheter at Skin Depth (cm):  12  Depth in Epidural Space (cm):  8  Events: no blood with aspiration, no cerebrospinal fluid with aspiration, no paresthesia and negative aspiration test    Test Dose:  Negative    Assessment:   Catheter Secured:  Tegaderm and tape (and mastisol)  Insertion:  Uncomplicated  Patient tolerance:  Patient tolerated the procedure well with no immediate complications

## 2019-08-14 NOTE — ROUTINE PROCESS
1915  Bedside and Verbal shift change report given to MOLINA Tucker RN (oncoming nurse) by Pastor Richey RN (offgoing nurse). Report included the following information SBAR, Kardex, Intake/Output, MAR, Accordion, Recent Results and Med Rec Status. Patient assessment completed. 2000  Patient is reporting nausea, and increased cramping. Tylenol, and Zofran administered. 2032  Patient ambulates to bathroom. 0157  Patient ambulates to bathroom. 0445  Pitocin started. 0443  Cook catheter comes out. 
 
0700  Bedside and Verbal shift change report given to [id number] Nikki Erickson[phone] (oncoming nurse) by Bigg Carias RN (offgoing nurse). Report included the following information SBAR, Kardex, Procedure Summary, Intake/Output, MAR, Accordion, Recent Results and Med Rec Status.

## 2019-08-14 NOTE — PROGRESS NOTES
Late Entry:  Pt seen at around 315. Variables persisting despite all rescucitative efforts. Accels now present between contractions  Decision to proceed with CS. Pitocin off.  Disc R/B/A  CS delayed due to needing to have a spinal placed in the OR - epidural not effective enough for surgery

## 2019-08-14 NOTE — PROGRESS NOTES
08/14/19 10:25 AM  CM met with patient and her /FOB Karly Ruiz ([phone]) to complete initial assessment and to begin discharge planning. Demographics were reviewed and confirmed; patient and FOB live together in [place] and this is their first baby. Patient practices as a  and will have 12 weeks off and then a flexible schedule. FOB works and will have 2 weeks off. Patient's family lives in the area and will provide support and assistance as needed. Patient has car seat, crib, clothing, and other necessary supplies. Patient plans to breastfeed and has a pump to use at home. [address] will provide medical follow up for the baby. Denied need for [facility] and Medicaid services.   Care Management Interventions  PCP Verified by CM: Yes(Dr. LEONARD [facility])  Mode of Transport at Discharge: Self  Transition of Care Consult (CM Consult): Discharge Planning  Current Support Network: Family Lives [place], Lives with Spouse  Confirm Follow Up Transport: Family  Plan discussed with Pt/Family/Caregiver: Yes  Freedom of Choice Offered: Yes  Discharge Location  Discharge Placement: Home with family assistance  YANNICK Brooks

## 2019-08-14 NOTE — PROGRESS NOTES
Labor Progress Note  Patient seen, fetal heart rate and contraction pattern evaluated, patient examined. Visit Vitals  /71   Pulse 60   Temp 98.2 °F (36.8 °C)   Resp 16   LMP 11/14/2018   SpO2 96%       Physical Exam:  Cervical Exam:  5/50 %/ /   Membranes:  Artificial Rupture of Membranes;  Amniotic Fluid: medium amount of clear fluid  Uterine Activity: Frequency: Every 3-4 minutes  Fetal Heart Rate: Reactive    Assessment/Plan:  Reassuring fetal status, Continue plan for vaginal delivery   IUPC and FSE placed to better evaluate labor and FHT

## 2019-08-14 NOTE — PROGRESS NOTES
1925: Bedside and Verbal shift change report given to MARISA Rojas (oncoming nurse) by Nick Coyle RN (offgoing nurse). Report included the following information SBAR, Kardex, OR Summary, Procedure Summary, Intake/Output, MAR, Accordion and Recent Results. TOTAL QBL 575mL    2015: TRANSFER - IN REPORT:    Verbal report received from [facility] (name) on [facility]  being received from MIU (unit) for routine progression of care      Report consisted of patients Situation, Background, Assessment and   Recommendations(SBAR). Information from the following report(s) SBAR, Kardex, OR Summary, Procedure Summary, Intake/Output, MAR, Accordion and Recent Results was reviewed with the receiving nurse. Opportunity for questions and clarification was provided. Assessment completed upon patients arrival to unit and care assumed.

## 2019-08-14 NOTE — PROGRESS NOTES
Pt evaluated. vtx well applied - 70/6/0 station  Repetitive variables for about 1.5 hours, return to baseline. No accels, moderate variability  Giving amnioinfusion now. O2 applied and pitocin has been decreased. Reevaluate in 30 min or sooner.  CS if no improvement remote from delivery

## 2019-08-14 NOTE — L&D DELIVERY NOTE
Delivery Summary    Patient: Alma Kulkarni MRN: [id number]  SSN: [id number]    YOB: 1985  Age: 30 y.o. Sex: female        Information for the patient's :  Nick Alvarado, Javier Carrasco [[id number]]       Labor Events:    Labor: No    Steroids: None   Cervical Ripening Date/Time:       Cervical Ripening Type: Thompson/EASI   Antibiotics During Labor: Yes   Rupture Identifier: Sac 1    Rupture Date/Time: 2019 9:30 AM   Rupture Type: AROM   Amniotic Fluid Volume: Moderate    Amniotic Fluid Description: Clear    Amniotic Fluid Odor: None    Induction: AROM; Thompson Bulb (balloon); Oxytocin       Induction Date/Time:        Indications for Induction: Gestational Hypertension    Augmentation: None   Augmentation Date/Time:      Indications for Augmentation:     Labor complications: Fetal Intolerance       Additional complications:        Delivery Events:  Indications For Episiotomy:     Episiotomy: None   Perineal Laceration(s): None   Repaired:     Periurethral Laceration Location:      Repaired:     Labial Laceration Location:     Repaired:     Sulcal Laceration Location:     Repaired:     Vaginal Laceration Location:     Repaired:     Cervical Laceration Location:     Repaired:     Repair Suture: None   Number of Repair Packets:     Estimated Blood Loss (ml):  ml     Delivery Date: 2019    Delivery Time: 4:28 PM   Delivery Type: , Low Transverse     Details    Trial of Labor: Yes   Primary/Repeat: Primary   Priority: Routine   Indications:  Fetal Intolerance of Labor       Sex:  Male     Gestational Age: 36w3d  Delivery Clinician:  Linsey Santoro  Living Status: Living   Delivery Location: OR OR #2          APGARS  One minute Five minutes Ten minutes   Skin color: 0   1        Heart rate: 2   2        Grimace: 2   2        Muscle tone: 2   2        Breathin   2        Totals: 8   9          Presentation: Vertex    Position:   Occiput Anterior  Resuscitation Method:  Suctioning-bulb; Tactile Stimulation     Meconium Stained: None      Cord Information: 3 Vessels  Complications: Nuchal Cord Without Compressions  Cord around: left upper extremity  Delayed cord clamping? Yes  Cord clamped date/time:2019  4:28 PM  Disposition of Cord Blood: Lab    Blood Gases Sent?: Yes    Placenta:  Date/Time: 2019  4:30 PM  Removal: Expressed      Appearance: Normal;Intact     [place] Measurements:  Birth Weight: 6 lb 13.4 oz (3.1 kg)      Birth Length: 1' 7\" (0.483 m)      Head Circumference: 1' 1.39\" (0.34 m)      Chest Circumference: 1' 0.6\" (0.32 m)     Abdominal Girth: 1' 0.21\" (0.31 m)    Other Providers:   ALBER DAMON;TIFFANIE WADDELL;MITZY HALL;Justin BELL MOLLY E;TAMMI VILCHIS;Karla PEDRAZA, Obstetrician;Primary Nurse;Primary [place] Nurse; Anesthesiologist;Crna;Tech;Tech             Group B Strep:   Lab Results   Component Value Date/Time    GrBStrep, External Positive 2019     Information for the patient's :  Pepper Saliva, Male [facility]   No results found for: ABORH, PCTABR, PCTDIG, BILI, ABORHEXT, ABORH    No results for input(s): PCO2CB, PO2CB, HCO3I, SO2I, IBD, PTEMPI, SPECTI, PHICB, ISITE, IDEV, IALLEN in the last 72 hours.

## 2019-08-14 NOTE — PROGRESS NOTES
0700 verbal and bedside report received from Carleene Habermann, RN. Patient care assumed at this time. 2547 Morning assessment completed at this time. No complaints. Discussed today's plan of care with patient. Denies any needs or concerns. Resting comfortably. Pitocin increased. 0735 Pitocin increased. Patient stating she is beginning to feel some light cramping. Family at bedside. No complaints. 3500 Assisted patient to bathroom. [id number] Attempting to readjust EFM. Difficulty tracing due to maternal habitus. Will request internal monitors from MD to appropriately assess uterine activity and FHR.     0908 RN rounding on patient. Stating she is uncomfortable with contractions but tolerable for now. 0930 Dr. Nia Christopher at bedside for SVE and AROM. 5/50 and AROM of moderate amount of clear fluid by MD. IUPC and FSE placed by MD without difficulty. 0532 Epidural bolus began as patient is requesting an epidural.     1028 Paged Dr. Sarath Rowan to come to bedside for epidural placement. 12 Dr. Don Meza at bedside to place epidural.    1041 Timeout performed with MD and patient. 1052 Epidural catheter in place. 1053 Test dose given by MD.     1055 Epidural taped into place. 46 MD completed epidural process. 1110 Patient comfortable with epidural. Will insert baeza catheter shortly. 1135 Baeza catheter inserted. Reassessment completed. Patient stating she is not feeling any contractions and plans to take a nap now. 1210 Patient stating she is feeling some discomfort with contractions on her left side. Repositioned patient towards left and pushed epidural bolus button. 1248 Patient stating epidural bolus helped with pain. 1325 SVE by Dr. Nia Christopher. 7/70/-1. To increase pitocin by 2 per MD.  Valeria Monroe changed, repositioned patient to right lateral, placed peanut ball between legs. 1346 Repositioned patient left lateral due to repetitive variables. Peanut ball replaced.      1355 Bed positioned to trendelenburg. 46 Updated Dr. Joseph Jacobson on variables. MD ordered amnioinfusion. 500 cc bolus with 75 ml/hr continuous normal saline through IUPC.     1429 Amnioinfusion initiated per order. 1436 Oxygen placed over maternal face and placed patient in high fowlers in attempt to reduce variables. 1438 Lateral right positioned. 1440 Decreased oxytocin from 14 to 10.     1445 Dr. Joseph Jacobson at bedside to review tracing and SVE. MD stating patient /0. Wanting to finish out the remainder of bolus for amnioinfusion then will reevaluate. 1450 Oxytocin discontinued at this time. 1457 Side lying hip release on right lateral position. 1502 IV fluid bolus started. 1506 High fowlers position. [id number] Dr. Joseph Jacobson at bedside. Decision for  section called at this time after discussing with patient and patient agreeable to plan to proceed with . 1512 Thompson emptied with 550 ml of clear urine. 1524 Chlorhexidine wipes used over abdomen. 1532 IUPC removed. 1536 Patient taken to OR for . 1539 FSE plugged in and  without decelerations. [phone] External monitoring performed on baby while waiting for epidural to set up. One small variable noted with baseline of 125; variable to 110.     1557 Epidural removed due to inadequate level and Dr. Gigi Doss placing spinal at this time. 1607 Spinal completed. Laying patient back on OR table. [phone] External monitoring performed on baby after spinal placed. 1613 Procedure timeout performed and skin incision made by MD.     6957 Delivery of vigorously crying male infant by Dr. Joseph Jacobson. Cord clamped and cut by MD and  handed to nursery nurse for drying and stimulation. 1630 Expressed removal of placenta. 65 Wilfred placed over uterine incision by MD.     1650 Incision closed with insorb josiah. OMAR dressing over incision. 1706 Patient transferred from [address] [place] [address] for recovery.  JOHN from delivery 510.     1708 Patient in room 205 via stretcher for recovery. 1744 Fundal performed. [phone] Patient doing well. Some discomfort with fundals, otherwise no pain. 1844 Fundal performed. Patient doing well. Family at bedside. 1926 Bedside and Verbal shift change report given to MARISA Sandoval (oncoming nurse) by Briaan Bill RN (offgoing nurse). Report included the following information SBAR, Kardex, Intake/Output, MAR, Accordion and Recent Results.

## 2019-08-14 NOTE — ANESTHESIA PROCEDURE NOTES
Spinal Block    Start time: 8/14/2019 3:57 PM  End time: 8/14/2019 4:07 PM  Performed by: Marlon Sidhu CRNA  Authorized by: Maureen Lauren DO     Pre-procedure:   Indications: primary anesthetic    Timeout Time: 15:57          Spinal Block:   Patient Position:  Seated  Prep Region:  Lumbar      Location:  L3-4  Technique:  Single shot        Needle:   Needle Type:  Pencan  Needle Gauge:  25 G  Attempts:  3      Events: CSF confirmed, no blood with aspiration and no paresthesia        Assessment:  Insertion:  Uncomplicated

## 2019-08-15 LAB
BASOPHILS # BLD: 0 K/UL (ref 0–0.1)
BASOPHILS NFR BLD: 0 % (ref 0–1)
DIFFERENTIAL METHOD BLD: ABNORMAL
EOSINOPHIL # BLD: 0 K/UL (ref 0–0.4)
EOSINOPHIL NFR BLD: 0 % (ref 0–7)
ERYTHROCYTE [DISTWIDTH] IN BLOOD BY AUTOMATED COUNT: 13.5 % (ref 11.5–14.5)
HCT VFR BLD AUTO: 33.1 % (ref 35–47)
HGB BLD-MCNC: 11 G/DL (ref 11.5–16)
IMM GRANULOCYTES # BLD AUTO: 0.1 K/UL (ref 0–0.04)
IMM GRANULOCYTES NFR BLD AUTO: 1 % (ref 0–0.5)
LYMPHOCYTES # BLD: 1.9 K/UL (ref 0.8–3.5)
LYMPHOCYTES NFR BLD: 17 % (ref 12–49)
MCH RBC QN AUTO: 30 PG (ref 26–34)
MCHC RBC AUTO-ENTMCNC: 33.2 G/DL (ref 30–36.5)
MCV RBC AUTO: 90.2 FL (ref 80–99)
MONOCYTES # BLD: 0.7 K/UL (ref 0–1)
MONOCYTES NFR BLD: 7 % (ref 5–13)
NEUTS SEG # BLD: 8.2 K/UL (ref 1.8–8)
NEUTS SEG NFR BLD: 75 % (ref 32–75)
NRBC # BLD: 0 K/UL (ref 0–0.01)
NRBC BLD-RTO: 0 PER 100 WBC
PLATELET # BLD AUTO: 155 K/UL (ref 150–400)
PMV BLD AUTO: 11.2 FL (ref 8.9–12.9)
RBC # BLD AUTO: 3.67 M/UL (ref 3.8–5.2)
WBC # BLD AUTO: 11 K/UL (ref 3.6–11)

## 2019-08-15 PROCEDURE — 74011250636 HC RX REV CODE- 250/636: Performed by: OBSTETRICS & GYNECOLOGY

## 2019-08-15 PROCEDURE — 65270000029 HC RM PRIVATE

## 2019-08-15 PROCEDURE — 74011250637 HC RX REV CODE- 250/637: Performed by: OBSTETRICS & GYNECOLOGY

## 2019-08-15 PROCEDURE — 85025 COMPLETE CBC W/AUTO DIFF WBC: CPT

## 2019-08-15 PROCEDURE — 36415 COLL VENOUS BLD VENIPUNCTURE: CPT

## 2019-08-15 PROCEDURE — 77030027138 HC INCENT SPIROMETER -A

## 2019-08-15 RX ORDER — ACETAMINOPHEN 500 MG
1000 TABLET ORAL
Status: DISCONTINUED | OUTPATIENT
Start: 2019-08-15 | End: 2019-08-17 | Stop reason: HOSPADM

## 2019-08-15 RX ORDER — IBUPROFEN 800 MG/1
800 TABLET ORAL EVERY 8 HOURS
Status: DISCONTINUED | OUTPATIENT
Start: 2019-08-15 | End: 2019-08-17 | Stop reason: HOSPADM

## 2019-08-15 RX ORDER — ONDANSETRON 4 MG/1
4 TABLET, ORALLY DISINTEGRATING ORAL
Status: DISCONTINUED | OUTPATIENT
Start: 2019-08-15 | End: 2019-08-17 | Stop reason: HOSPADM

## 2019-08-15 RX ADMIN — SIMETHICONE CHEW TAB 80 MG 80 MG: 80 TABLET ORAL at 11:47

## 2019-08-15 RX ADMIN — DOCUSATE SODIUM 100 MG: 100 CAPSULE, LIQUID FILLED ORAL at 20:57

## 2019-08-15 RX ADMIN — IBUPROFEN 800 MG: 800 TABLET ORAL at 11:46

## 2019-08-15 RX ADMIN — KETOROLAC TROMETHAMINE 30 MG: 30 INJECTION, SOLUTION INTRAMUSCULAR at 06:30

## 2019-08-15 RX ADMIN — ONDANSETRON 4 MG: 2 INJECTION INTRAMUSCULAR; INTRAVENOUS at 00:38

## 2019-08-15 RX ADMIN — IBUPROFEN 800 MG: 800 TABLET ORAL at 20:57

## 2019-08-15 RX ADMIN — DOCUSATE SODIUM 100 MG: 100 CAPSULE, LIQUID FILLED ORAL at 11:47

## 2019-08-15 RX ADMIN — KETOROLAC TROMETHAMINE 30 MG: 30 INJECTION, SOLUTION INTRAMUSCULAR at 00:38

## 2019-08-15 RX ADMIN — SIMETHICONE CHEW TAB 80 MG 80 MG: 80 TABLET ORAL at 20:57

## 2019-08-15 NOTE — PROGRESS NOTES
Post-Operative Day Number 1 Progress Note    Patient doing well post-op day 1 from  delivery without significant complaints. Pain controlled on current medication. Voiding without difficulty, normal lochia. Vitals:    Patient Vitals for the past 8 hrs:   BP Temp Pulse Resp   08/15/19 1451 115/77 98.1 °F (36.7 °C) 86 16     Temp (24hrs), Av.8 °F (37.1 °C), Min:98.1 °F (36.7 °C), Max:99.2 °F (37.3 °C)      Vital signs stable, afebrile. Exam:  Patient without distress. Abdomen soft, fundus firm at level of umbilicus, nontender. Incision dry and clean without erythema. Lower extremities are negative for swelling, cords or tenderness. Labs:   Recent Results (from the past 24 hour(s))   CBC WITH AUTOMATED DIFF    Collection Time: 08/15/19  3:22 AM   Result Value Ref Range    WBC 11.0 3.6 - 11.0 K/uL    RBC 3.67 (L) 3.80 - 5.20 M/uL    HGB 11.0 (L) 11.5 - 16.0 g/dL    HCT 33.1 (L) 35.0 - 47.0 %    MCV 90.2 80.0 - 99.0 FL    MCH 30.0 26.0 - 34.0 PG    MCHC 33.2 30.0 - 36.5 g/dL    RDW 13.5 11.5 - 14.5 %    PLATELET 288 146 - 917 K/uL    MPV 11.2 8.9 - 12.9 FL    NRBC 0.0 0  WBC    ABSOLUTE NRBC 0.00 0.00 - 0.01 K/uL    NEUTROPHILS 75 32 - 75 %    LYMPHOCYTES 17 12 - 49 %    MONOCYTES 7 5 - 13 %    EOSINOPHILS 0 0 - 7 %    BASOPHILS 0 0 - 1 %    IMMATURE GRANULOCYTES 1 (H) 0.0 - 0.5 %    ABS. NEUTROPHILS 8.2 (H) 1.8 - 8.0 K/UL    ABS. LYMPHOCYTES 1.9 0.8 - 3.5 K/UL    ABS. MONOCYTES 0.7 0.0 - 1.0 K/UL    ABS. EOSINOPHILS 0.0 0.0 - 0.4 K/UL    ABS. BASOPHILS 0.0 0.0 - 0.1 K/UL    ABS. IMM. GRANS. 0.1 (H) 0.00 - 0.04 K/UL    DF AUTOMATED         Assessment and Plan:  Patient appears to be having uncomplicated post- course. Continue routine post-op care and maternal education.

## 2019-08-15 NOTE — LACTATION NOTE
This note was copied from a baby's chart. Mother has been trying to breastfeed baby but he would not latch on. Mother wanted to try to nurse baby with [facility]. Baby was awake and put to breast. Baby was spitty and then not interested in feeding. Mother tried for 10 minutes. LC then had mother hand express 10 drops colostrum which were finger fed to baby. Symphony pump set up - instructed mother to pump Q 2-3 hr if baby does not breastfeed to help stimulate her milk supply. Discussed with mother her plan for feeding. Reviewed the benefits of exclusive breast milk feeding during the hospital stay. Informed her of the risks of using formula to supplement in the first few days of life as well as the benefits of successful breast milk feeding; referred her to the Breastfeeding booklet about this information. She acknowledges understanding of information reviewed and states that it is her plan to breastfeed her infant. Will support her choice and offer additional information as needed. Pumping:  Guidelines for pumping, milk collection and storage, proper cleaning of pump parts all reviewed. How to establish and maintain breast milk supply through pumping reviewed. Differences between hospital grade rental pumps vs store bought double electric/hand pumps discussed. Set up pumping with double electric set up. Assisted with pump session. List of area pump rental locations and lactation support services provided. Encouraged mom to attempt feeding with baby led feeding cues. Just as sucking on fingers, rooting, mouthing. Looking for 8-12 feedings in 24 hours. Don't limit baby at breast, allow baby to come of breast on it's own. Baby may want to feed  often and may increase number of feedings on second day of life. Skin to skin encouraged.       If baby doesn't nurse,  Mom should  hand express  10-20 drops of colostrum and drip into baby's mouth, or give to baby by finger feeding, cup feeding, or spoon feeding at least every 2-3 hours. Hand Expression Education:  Mom taught how to manually hand express her colostrum. Emphasized the importance of providing infant with valuable colostrum as infant rests skin to skin at breast.  Aware to avoid extended periods of non-feeding. Aware to offer 10-20+ drops of colostrum every 2-3 hours until infant is latching and nursing effectively. Taught the rationale behind this low tech but highly effective evidence based practice. Pt will successfully establish breastfeeding by feeding in response to early feeding cues   or wake every 3h, will obtain deep latch, and will keep log of feedings/output. Taught to BF at hunger cues and or q 2-3 hrs and to offer 10-20 drops of hand expressed colostrum at any non-feeds. Breast Assessment  Left Breast: Extra large  Left Nipple: Everted, Large  Right Breast: Extra large  Right Nipple: Everted, Large  Breast- Feeding Assessment  Attends Breast-Feeding Classes: Yes  Breast-Feeding Experience: No  Breast Trauma/Surgery: No  Type/Quality: Good(Mother states baby breast fed well last giancarlo. after delivery. He has been sleepy and not interested in nursing. Mother able to hand express drops.)  Lactation Consultant Visits  Breast-Feedings: Attempted breast-feeding(Mother tried to nurse with [address] [facility]. Baby is spitty and not interested in nursing. Mother able to hand express 10 drops which were finger fed to baby. Symphony pump set up - instructed mom to pump 20 min. if baby does not nurse to stimulate her milk supply)  Mother/Infant Observation  Mother Observation: Alignment, Holds breast, Close hold  Infant Observation: Opens mouth  LATCH Documentation  Latch: (Breastfeeding attempted. Mom hand expressed 10 drops)  Mother given LC# and support group info.  - breastfeeding handouts given

## 2019-08-15 NOTE — ANESTHESIA PREPROCEDURE EVALUATION
Relevant Problems   No relevant active problems       Anesthetic History   No history of anesthetic complications            Review of Systems / Medical History  Patient summary reviewed and pertinent labs reviewed    Pulmonary  Within defined limits                 Neuro/Psych   Within defined limits           Cardiovascular  Within defined limits                Exercise tolerance: >4 METS     GI/Hepatic/Renal  Within defined limits              Endo/Other  Within defined limits           Other Findings              Physical Exam    Airway  Mallampati: II  TM Distance: 4 - 6 cm  Neck ROM: normal range of motion   Mouth opening: Normal     Cardiovascular    Rhythm: regular  Rate: normal         Dental    Dentition: Upper dentition intact and Lower dentition intact     Pulmonary  Breath sounds clear to auscultation               Abdominal         Other Findings            Anesthetic Plan    ASA: 2  Anesthesia type: epidural          Induction: Intravenous  Anesthetic plan and risks discussed with: Patient

## 2019-08-15 NOTE — ANESTHESIA POSTPROCEDURE EVALUATION
Procedure(s):   SECTION.     spinal, epidural    Anesthesia Post Evaluation      Multimodal analgesia: multimodal analgesia not used between 6 hours prior to anesthesia start to PACU discharge  Patient location during evaluation: PACU  Patient participation: complete - patient participated  Level of consciousness: awake  Pain management: adequate  Airway patency: patent  Anesthetic complications: no  Cardiovascular status: acceptable, blood pressure returned to baseline and hemodynamically stable  Respiratory status: acceptable  Hydration status: acceptable  Post anesthesia nausea and vomiting:  controlled      Vitals Value Taken Time   /58 2019  6:39 PM   Temp 37.1 °C (98.7 °F) 2019  5:16 PM   Pulse 66 2019  6:39 PM   Resp 15 2019  5:16 PM   SpO2 96 % 2019  7:04 PM

## 2019-08-15 NOTE — ROUTINE PROCESS
Bedside and Verbal shift change report given to Serena Kinney RN (oncoming nurse) by Wilber Hill RN (offgoing nurse). Report included the following information SBAR, Kardex and MAR.

## 2019-08-15 NOTE — ROUTINE PROCESS
Bedside shift change report given to Ed Antoni RN (oncoming nurse) by Tonie Tomlin RN and Sana Torres RN (offgoing nurse). Report included the following information SBAR, Kardex and MAR.

## 2019-08-15 NOTE — PROGRESS NOTES
Provided pastoral care visit to [facility] 5 patient. Did not include sacramental care.     Veda Scott

## 2019-08-16 PROCEDURE — 74011250637 HC RX REV CODE- 250/637: Performed by: OBSTETRICS & GYNECOLOGY

## 2019-08-16 PROCEDURE — 65270000029 HC RM PRIVATE

## 2019-08-16 RX ORDER — IBUPROFEN 800 MG/1
800 TABLET ORAL
Qty: 60 TAB | Refills: 0 | Status: SHIPPED | OUTPATIENT
Start: 2019-08-16 | End: 2020-01-20 | Stop reason: ALTCHOICE

## 2019-08-16 RX ORDER — HYDROCODONE BITARTRATE AND ACETAMINOPHEN 5; 325 MG/1; MG/1
1 TABLET ORAL
Qty: 20 TAB | Refills: 0 | Status: SHIPPED | OUTPATIENT
Start: 2019-08-16 | End: 2019-08-19

## 2019-08-16 RX ADMIN — SIMETHICONE CHEW TAB 80 MG 80 MG: 80 TABLET ORAL at 18:09

## 2019-08-16 RX ADMIN — IBUPROFEN 800 MG: 800 TABLET ORAL at 21:32

## 2019-08-16 RX ADMIN — ACETAMINOPHEN 1000 MG: 500 TABLET ORAL at 16:03

## 2019-08-16 RX ADMIN — IBUPROFEN 800 MG: 800 TABLET ORAL at 13:40

## 2019-08-16 RX ADMIN — DOCUSATE SODIUM 100 MG: 100 CAPSULE, LIQUID FILLED ORAL at 18:09

## 2019-08-16 RX ADMIN — SIMETHICONE CHEW TAB 80 MG 80 MG: 80 TABLET ORAL at 10:20

## 2019-08-16 RX ADMIN — IBUPROFEN 800 MG: 800 TABLET ORAL at 04:58

## 2019-08-16 RX ADMIN — ACETAMINOPHEN 1000 MG: 500 TABLET ORAL at 22:50

## 2019-08-16 RX ADMIN — SIMETHICONE CHEW TAB 80 MG 80 MG: 80 TABLET ORAL at 13:41

## 2019-08-16 RX ADMIN — DOCUSATE SODIUM 100 MG: 100 CAPSULE, LIQUID FILLED ORAL at 10:20

## 2019-08-16 NOTE — PROGRESS NOTES
Post-Operative Day Number 2 Progress Note    Patient doing well post-op day 2 from  delivery without significant complaints. Pain controlled on current medication. Voiding without difficulty, normal lochia. Vitals:    Patient Vitals for the past 8 hrs:   BP Temp Pulse Resp   19 0721 146/78 98.5 °F (36.9 °C) 80 16     Temp (24hrs), Av.5 °F (36.9 °C), Min:98.1 °F (36.7 °C), Max:98.9 °F (37.2 °C)      Vital signs stable, afebrile. Exam:  Patient without distress. Abdomen soft, fundus firm at level of umbilicus, nontender. Incision dry and clean without erythema. Lower extremities are negative for swelling, cords or tenderness. Labs: No results found for this or any previous visit (from the past 24 hour(s)). Assessment and Plan:  Patient appears to be having uncomplicated post- course. Continue routine post-op care and maternal education.

## 2019-08-16 NOTE — LACTATION NOTE
This note was copied from a baby's chart. Mother will successfully establish breastfeeding by feeding in response to early feeding cues   or wake every 3h, will obtain deep latch, and will keep log of feedings/output. Taught to BF at hunger cues and or q 2-3 hrs and to offer 10-20 drops of hand expressed colostrum at any non-feeds. Breast Assessment  Left Breast: Extra large  Left Nipple: Everted, Short, Large  Right Breast: Extra large  Right Nipple: Everted, Large, Short  Breast- Feeding Assessment  Attends Breast-Feeding Classes: Yes  Breast-Feeding Experience: No  Breast Trauma/Surgery: No  Type/Quality: Attempted  Lactation Consultant Visits  Breast-Feedings: Good (Baby was awake and trying to nurse but having latch difficulty. Nipples are everted but short. Nipple shield applied and baby latched on well and nursed well. Mother able to feel good pulls and tugs. Baby needed occassional stimulation to suckle. )  Mother/Infant Observation  Mother Observation: Alignment, Recognizes feeding cues, Breast comfortable, Holds breast, Close hold  Infant Observation: Audible swallows, Lips flanged, lower, Lips flanged, upper, Opens mouth, Frenulum checked, Latches nipple and aereolae, Rhythmic suck(Can extend tongue past lower gumline.  Mother to discuss with baby's doctor)  [facility] Documentation  Latch: Repeated attempts, hold nipple in mouth, stimulate to suck  Audible Swallowing: A few with stimulation  Type of Nipple: Everted (after stimulation)  Comfort (Breast/Nipple): Soft/non-tender  Hold (Positioning): Full assist, teach one side, mother does other, staff holds  [facility] Score: 7

## 2019-08-16 NOTE — LACTATION NOTE
This note was copied from a baby's chart. Mother states baby did breast feed last night. She pumped this morning and got 5 ml that she spilled. Baby was circumcised this am and is sleepy. Mother was pumping with the symphony pump and got about 5 large drops which were finger fed to baby. Instructed mother to put baby skin to skin to help her milk come in and enhance breastfeeding. Infant weight loss is -6.6%. Baby is voiding and spooling. Pumping:  Guidelines for pumping, milk collection and storage, proper cleaning of pump parts all reviewed. How to establish and maintain breast milk supply through pumping reviewed. Differences between hospital grade rental pumps vs store bought double electric/hand pumps discussed. Set up pumping with double electric set up. Assisted with pump session. List of area pump rental locations and lactation support services provided. Pt will successfully establish breastfeeding by feeding in response to early feeding cues   or wake every 3h, will obtain deep latch, and will keep log of feedings/output. Taught to BF at hunger cues and or q 2-3 hrs and to offer 10-20 drops of hand expressed colostrum at any non-feeds. Breast Assessment  Left Breast: Extra large  Left Nipple: Everted, Large  Right Breast: Extra large  Right Nipple: Everted, Large  Breast- Feeding Assessment  Attends Breast-Feeding Classes: Yes  Breast-Feeding Experience: No  Breast Trauma/Surgery: No  Type/Quality: Attempted  Lactation Consultant Visits  Breast-Feedings: (Mother was pumping when LC came in. She tried to breastfeed but baby was circumcised this am and is sleepy. Mother got about 5 large drops which were finger fed to baby.  Instructed her to put baby skin to skin. )

## 2019-08-16 NOTE — PROGRESS NOTES
Bedside and Verbal shift change report given to JESSIKA Wheeler RN (oncoming nurse) by Zoya Ontiveros RN & Elizabet Jerome RN (offgoing nurse). Report included the following information SBAR, Kardex, Intake/Output and MAR.

## 2019-08-16 NOTE — DISCHARGE SUMMARY
Obstetrical Discharge Summary     Name: James Samayoa MRN: [id number]  SSN: [id number]    YOB: 1985  Age: 30 y.o. Sex: female      Admit Date: 2019    Discharge Date: 2019     Admitting Physician: Daniela Mclean MD     Attending Physician:  Jennifer Kee MD     Admission Diagnoses: Pregnancy [Z34.90], Gestational HTN    Discharge Diagnoses:   Information for the patient's :  Barbra Sierra, Male [facility] [[id number]]   Delivery of a 6 lb 13.4 oz (3.1 kg) male infant via , Low Transverse on 2019 at 4:28 PM  by Daniela Mclean. Apgars were 8  and 9 . Additional Diagnoses:   Hospital Problems  Date Reviewed: 2019          Codes Class Noted POA    Pregnancy ICD-10-CM: Z34.90  ICD-9-CM: V22.2  2019 Unknown             Lab Results   Component Value Date/Time    Rubella, External immune 2019    GrBStrep, External Positive 2019       Hospital Course: Induction for GHTN. CS for persistent variables - baby had large cone head (6#) and would recommend elective repeat CS in future. Normal pp course  Condition: good  Patient Instructions:   Current Discharge Medication List      START taking these medications    Details   ibuprofen (MOTRIN) 800 mg tablet Take 1 Tab by mouth every six (6) hours as needed for Pain. Qty: 60 Tab, Refills: 0      HYDROcodone-acetaminophen (NORCO) 5-325 mg per tablet Take 1 Tab by mouth every six (6) hours as needed for Pain for up to 3 days. Max Daily Amount: 4 Tabs. Qty: 20 Tab, Refills: 0    Associated Diagnoses: Post-op pain         CONTINUE these medications which have NOT CHANGED    Details   PNV PO.43/EHBGQOS fum/folic ac (PRENATAL PO) Take  by mouth. Associated Diagnoses: Encounter for gynecological examination (general) (routine) without abnormal findings; Screening for HPV (human papillomavirus)             Reference my discharge instructions.     Follow-up Appointments   Procedures    FOLLOW UP VISIT Appointment in: 6 Weeks Standing Status:   Standing     Number of Occurrences:   1     Order Specific Question:   Appointment in     Answer:   6 Weeks        Signed By:  Rand Morataya MD     August 16, 2019

## 2019-08-16 NOTE — ROUTINE PROCESS
Bedside shift change report given to Bradly [place] RN and Artemio Lopez RN (oncoming nurse) by Carly Dill RN and Bo Zarate RN (offgoing nurse). Report included the following information SBAR, Kardex and MAR.

## 2019-08-17 VITALS
RESPIRATION RATE: 16 BRPM | HEART RATE: 72 BPM | DIASTOLIC BLOOD PRESSURE: 69 MMHG | TEMPERATURE: 98.3 F | SYSTOLIC BLOOD PRESSURE: 136 MMHG | OXYGEN SATURATION: 96 %

## 2019-08-17 PROBLEM — Z34.90 PREGNANCY: Status: RESOLVED | Noted: 2019-08-13 | Resolved: 2019-08-17

## 2019-08-17 PROBLEM — Z34.00 SUPERVISION OF NORMAL FIRST PREGNANCY, ANTEPARTUM: Status: RESOLVED | Noted: 2019-01-10 | Resolved: 2019-08-17

## 2019-08-17 PROCEDURE — 74011250637 HC RX REV CODE- 250/637: Performed by: OBSTETRICS & GYNECOLOGY

## 2019-08-17 RX ADMIN — IBUPROFEN 800 MG: 800 TABLET ORAL at 05:52

## 2019-08-17 RX ADMIN — DOCUSATE SODIUM 100 MG: 100 CAPSULE, LIQUID FILLED ORAL at 13:53

## 2019-08-17 RX ADMIN — IBUPROFEN 800 MG: 800 TABLET ORAL at 13:53

## 2019-08-17 NOTE — LACTATION NOTE
This note was copied from a baby's chart. Mother and baby for discharge. Baby had weight loss os -10.64%. Baby has a pediatric appointment in 2 days. Mother states baby cluster fed last night. Her milk is not in yet. Baby did not seem content and mother concerned about weight loss so she started formula early this morning. Reviewed how to know baby is getting enough breast milk and what to expect re: baby 's output. She has been using a nipple shield but was able to get baby to latch on well without nipple shield and breastfeed baby for 15 minutes with LC. Mother plans to put baby to breast when home then pump for 20 minutes to help stimulate her breast milk  supply and offer baby formula if baby not content after breastfeeding. Reviewed breastfeeding basics:  Supply and demand, breastfeed baby 8-12 times in 24 hr., feed baby on demand,  stomach size, early  Feeding cues, skin to skin, positioning and baby led latch-on, assymetrical latch with signs of good, deep latch vs shallow, feeding frequency and duration, and log sheet for tracking infant feedings and output. Breastfeeding Booklet and Warm line information given. Discussed typical  weight loss and the importance of infant weight checks with pediatrician 1-2 post discharge. Engorgement Care Guidelines:  Reviewed how milk is made and normal phases of milk production. Taught care of engorged breasts - frequent breastfeeding encouraged, cool packs and motrin as tolerated. Anticipatory guidance shared. Care for sore/tender nipples discussed:  ways to improve positioning and latch practiced and discussed, hand express colostrum after feedings and let air dry, light application of lanolin, hydrogel pads, seek comfortable laid back feeding position, start feedings on least sore side first.    Discussed eating a healthy diet. Instructed mother to eat a variety of foods in order to get a well balanced diet.  She should consume an extra 500 calories per day (more than her non-pregnant requirement.) These extra calories will help provide energy needed for optimal breast milk production. Mother also encouraged to \"drink to thirst\" and it is recommended that she drink fluids such as water, fruit/vegetable juice. Nutritious snacks should be available so that she can eat throughout the day to help satisfy her hunger and maintain a good milk supply. Pumping:  Guidelines for pumping, milk collection and storage, proper cleaning of pump parts all reviewed. How to establish and maintain breast milk supply through pumping reviewed. Differences between hospital grade rental pumps vs store bought double electric/hand pumps discussed. Set up pumping with double electric set up. List of area pump rental locations and lactation support services provided. Pt will successfully establish breastfeeding by feeding in response to early feeding cues   or wake every 3h, will obtain deep latch, and will keep log of feedings/output. Taught to BF at hunger cues and or q 2-3 hrs and to offer 10-20 drops of hand expressed colostrum at any non-feeds. Breast Assessment  Left Breast: Extra large  Left Nipple: Everted, Intact, Short, Large  Right Breast: Extra large  Right Nipple: Everted, Large, Intact, Short  Breast- Feeding Assessment  Attends Breast-Feeding Classes: Yes  Breast-Feeding Experience: No  Breast Trauma/Surgery: No  Type/Quality: Good(Mother and baby for D/C. Mother states baby cluster fed last night. Baby had a 10.6% weight loss. She has been using a nipple shield. Mom started pumping after breastfeeding - gets drops. Also started formula after breastfeeding if baby not satisfied. )  Lactation Consultant Visits  Breast-Feedings: (Baby awakened - he latched on well to left breast and nursed vigorously in starter position without nipple shield (able to sandwich nipple for baby to latch onto) for 15 minutes then came off breast.Baby offered formula - he took 10 ml then fell asleep. )  Mother/Infant Observation  Mother Observation: Alignment, Recognizes feeding cues  Infant Observation: Audible swallows, Lips flanged, lower, Lips flanged, upper, Feeding cues, Opens mouth, Relaxed after feeding, Latches nipple and aereolae, Rhythmic suck, Frenulum checked  LATCH Documentation  Latch: Grasps breast, tongue down, lips flanged, rhythmic sucking  Audible Swallowing: A few with stimulation  Type of Nipple: Everted (after stimulation)  Comfort (Breast/Nipple): Soft/non-tender  Hold (Positioning): Full assist, teach one side, mother does other, staff holds  [facility] Score: 8      Chart shows numerous feedings, void, stool WNL. Discussed importance of monitoring outputs and feedings on first week of life. Discussed ways to tell if baby is  getting enough breast milk, ie  voids and stools, change in color of stool, and return to birth wt within 2 weeks. Follow up with pediatrician visit for weight check in 1-2 days (per AAP guidelines.)  Encouraged to call Warm Line  [phone]  for any questions/problems that arise.  Mother also given breastfeeding support group dates and times for any future needs

## 2019-08-17 NOTE — PROGRESS NOTES
Post-Operative Day Number 3 Progress/Discharge Note    Patient doing well post-op day 3 from  delivery without significant complaints. Pain controlled on current medication. Voiding without difficulty, normal lochia. Tolerating regular diet without nausea or vomiting.  +flatus. Breastfeeding well. States they are supplementing because she does not have mature milk and he needs to be fed. Vitals:    Patient Vitals for the past 8 hrs:   BP Temp Pulse Resp   19 0730 136/69 98.3 °F (36.8 °C) 72 16     Temp (24hrs), Av.7 °F (37.1 °C), Min:98.3 °F (36.8 °C), Max:99 °F (37.2 °C)        Exam:  Patient without distress. Lungs:  CTA bilaterally               CV:  Regular rate and rhythm               Breasts: filling, nipples intact               Abdomen soft, nondistended, normal bowel sounds               Uterus: fundus firm at level of umbilicus, nontender. Incision:  Intact,  without erythema, exudate, or induration. Lower extremities are negative for cords or tenderness; 1+ swelling. Labs: No results found for this or any previous visit (from the past 24 hour(s)). Assessment and Plan:  Postoperative day #3, S/P C/S. Doing well. _lactation support   - discharge to home   - f/u in office 6wks, sooner prn.   Plans OCP's for contraception

## 2019-08-17 NOTE — PROGRESS NOTES
Pt off unit in stable condition via wheelchair with volunteers for discharge home per Dr. Nissa Gonzalez Self. Pt is aware to follow-up in 6 weeks. OMAR dressing (information sheet provided) and steristrip to bed removed 1 week call if any questions or concerns. Prescriptions given to pt. Pt denies any HA, dizziness, N/V or pain at this time. Infant in car seat and discharged with mother.

## 2019-08-17 NOTE — ROUTINE PROCESS
Bedside shift change report given to Karli Soto RN (oncoming nurse) by Wesley Hendrix RN and Johnna Sanz RN (offgoing nurse). Report included the following information SBAR, Kardex and MAR.

## 2019-08-17 NOTE — DISCHARGE INSTRUCTIONS
POST DELIVERY DISCHARGE INSTRUCTIONS    Name: [place]  YOB: 1985  Primary Diagnosis: Active Problems:    Pregnancy (2019)        General:     Diet/Diet Restrictions:  Eight 8-ounce glasses of fluid daily (water, juices); avoid excessive caffeine intake. Meals/snacks as desired which are high in fiber and carbohydrates and low in fat and cholesterol. Physical Activity / Restrictions / Safety:     Avoid heavy lifting, no more that 8 lbs. For 2-3 weeks; No driving while taking narcotic pain medication. Post  patients should not drive until pain free. No intercourse 4-6 weeks, no douching or tampon use. May resume exercise in 6 weeks. Discharge Instructions/Special Treatment/Home Care Needs:     Continue prenatal vitamins. Continue to use squirt bottle with warm water on your episiotomy after each bathroom use until bleeding stops. If steri-strips applied to your incision, remove in 7 days. Take stool softeners daily. Call your doctor for the following:     Fever over 101 degrees by mouth. Vaginal bleeding heavier than a normal menstrual period or lost larger than a golf ball. Red streaks or increased swelling of legs, painful red streaks on your breast.  Painful urination, or increased pain, redness or discharge with your incision. Pain Management:     Pain Management:   Take Acetaminophen (Tylenol) or Ibuprofen (Advil, Motrin), as directed for pain. Use a warm Sitz bath 3 times daily to relieve episiotomy or hemorrhoidal discomfort. Heating pad to  incision as needed. For hemorrhoidal discomfort, use Tucks and Anusol cream as needed and directed.     Follow-Up Care:     Appointment with MD:   Follow-up Appointments   Procedures    FOLLOW UP VISIT Appointment in: 6 Weeks     Standing Status:   Standing     Number of Occurrences:   1     Order Specific Question:   Appointment in     Answer:   6 Weeks     Telephone number: [phone]    Signed By: Kosta Lakhani MD                                                                                                   Date: 2019 Time: 4:59 PM    POSTPARTUM DISCHARGE INSTRUCTIONS       Name:  [place]  YOB: 1985  Admission Diagnosis:  Pregnancy [Z34.90]     Discharge Diagnosis:    Problem List as of 2019 Date Reviewed: 2019          Codes Class Noted - Resolved    Postpartum care following  delivery ICD-10-CM: Z39.2  ICD-9-CM: V24.2  2019 - Present        Obesity, morbid ([address] ICD-10-CM: E66.01  ICD-9-CM: 278.01  2018 - Present        At high risk for breast cancer ICD-10-CM: Z91.89  ICD-9-CM: V49.89  2017 - Present    Overview Signed 2017  4:03 PM by Katerina Pressley risk 24.3 %             Dysmenorrhea ICD-10-CM: N94.6  ICD-9-CM: 625.3  Unknown - Present        Family history of breast cancer ICD-10-CM: Z80.3  ICD-9-CM: V16.3  9/15/2014 - Present    Overview Addendum 2017  4:02 PM by Katerina Hanna     Mother diagnosed with breast cancer age 35(Mallory Nunez ), maternal cousin, maternal aunt also with breast cancer all post menopausal. Her mother does not carry the BRCA gene. RESOLVED: Pregnancy ICD-10-CM: Z34.90  ICD-9-CM: V22.2  2019 - 2019        RESOLVED: Supervision of normal first pregnancy, antepartum ICD-10-CM: Z34.00  ICD-9-CM: V22.0  1/10/2019 - 2019    Overview Addendum 2019  2:16 PM by Ellie Laughlin     Intrauterine pregnancy with the following problems identified:   EDC 2019 by D=  Femara conception  Had flu vaccine  NIPTS being repeated 2/15 due to insuff fetal DNA - still insufficient  AFP/tetra--negative  RH neg-rhogam given 19  BMI>40  Tdap given 19  GBS resistant to clind - Ancef/Vanc-GBS Positive  Induction 19. Jay . Pt aware.                  Attending Physician:  Fran Levine MD    Delivery Type:   Section: What to Expect at Home    Your Recovery:  A  section, or , is surgery to deliver your baby through a cut, called an incision that the doctor makes in your lower belly and uterus. You may have some pain in your lower belly and need pain medicine for 1 to 2 weeks. You can expect some vaginal bleeding for several weeks. You will probably need about 6 weeks to fully recover. It is important to take it easy while the incision is healing. Avoid heavy lifting, strenuous activities, or exercises that strain the belly muscles while you are recovering. Ask a family member or friend for help with housework, cooking, and shopping. This care sheet gives you a general idea about how long it will take for you to recover. But each person recovers at a different pace. Follow the steps below to get better as quickly as possible. How can you care for yourself at home? Activity  · Rest when you feel tired. Getting enough sleep will help you recover. · Try to walk each day. Start by walking a little more than you did the day before. Bit by bit, increase the amount you walk. Walking boosts blood flow and helps prevent pneumonia, constipation, and blood clots. · Avoid strenuous activities, such as bicycle riding, jogging, weightlifting, and aerobic exercise, for 6 weeks or until your doctor says it is okay. · Until your doctor says it is okay, do not lift anything heavier than your baby. · Do not do sit-ups or other exercise that strain the belly muscles for 6 weeks or until your doctor says it is okay. · Hold a pillow over your incision when you cough or take deep breaths. This will support your belly and decrease your pain. · You may shower as usual. Pat the incision dry when you are done. · You will have some vaginal bleeding. Wear sanitary pads. Do not douche or use tampons until your doctor says it is okay. · Ask your doctor when you can drive again. · You will probably need to take at least 6 weeks off work.  It depends on the type of work you do and how you feel. · Wait until you are healed (about 4 to 6 weeks) before you have sexual intercourse. Your doctor will tell you when it is okay to have sex. · Talk to your doctor about birth control. You can get pregnant even before your period returns. Also, you can get pregnant while you are breast-feeding. Incision care  Your skin is your body's first line of defense against germs, but an incision site leaves an easy way for germs to enter your body. To prevent infection:  · Clean your hands frequently and before and after changing any touching any dressings. · If you have strips of tape on the incision, leave the tape on for a week or until it falls off. · Look at your incision closely every day for any changes. Contact your doctor if you experience any signs of infection, such as fever or increased redness at the surgical site. · Wash the area daily with warm, soapy water, and pat it dry. Don't use hydrogen peroxide or alcohol, which can slow healing. You may cover the area with a gauze bandage if it weeps or rubs against clothing. Change the bandage every day. · Keep the area clean and dry. Diet  · You can eat your normal diet. If your stomach is upset, try bland, low-fat foods like plain rice, broiled chicken, toast, and yogurt. · Drink plenty of fluids (unless your doctor tells you not to). · You may notice that your bowel movements are not regular right after your surgery. This is common. Try to avoid constipation and straining with bowel movements. You may want to take a fiber supplement every day. If you have not had a bowel movement after a couple of days, ask your doctor about taking a mild laxative. · If you are breast-feeding, do not drink any alcohol. Medicines  · Take pain medicines exactly as directed. · If the doctor gave you a prescription medicine for pain, take it as prescribed.   · If you are not taking a prescription pain medicine, ask your doctor if you can take an over-the-counter medicine such as acetaminophen (Tylenol), ibuprofen (Advil, Motrin), or naproxen (Aleve), for cramps. Read and follow all instructions on the label. Do not take aspirin, because it can cause more bleeding. Do not take acetaminophen (Tylenol) and other acetaminophen containing medications (i.e. Percocet) at the same time. · If you think your pain medicine is making you sick to your stomach:  · Take your medicine after meals (unless your doctor has told you not to). · Ask your doctor for a different pain medicine. · If your doctor prescribed antibiotics, take them as directed. Do not stop taking them just because you feel better. You need to take the full course of antibiotics. Mental Health  · Many women get the \"baby blues\" during the first few days after childbirth. You may lose sleep, feel irritable, and cry easily. You may feel happy one minute and sad the next. Hormone changes are one cause of these emotional changes. Also, the demands of a new baby, along with visits from relatives or other family needs, add to a mother's stress. The \"baby blues\" often peak around the fourth day. Then they ease up in less than 2 weeks. · If your moodiness or anxiety lasts for more than 2 weeks, or if you feel like life is not worth living, you may have postpartum depression. This is different for each mother. Some mothers with serious depression may worry intensely about their infant's well-being. Others may feel distant from their child. Some mothers might even feel that they might harm their baby. A mother may have signs of paranoia, wondering if someone is watching her. · With all the changes in your life, you may not know if you are depressed. Pregnancy sometimes causes changes in how you feel that are similar to the symptoms of depression. · Symptoms of depression include:  · Feeling sad or hopeless and losing interest in daily activities.  These are the most common symptoms of depression. · Sleeping too much or not enough. · Feeling tired. You may feel as if you have no energy. · Eating too much or too little. · [employer] ([employer]) offers a Warm line; Chat with the Expert phone sessions; Information and Articles about Pregnancy and Postpartum Mood Disorders; Comprehensive List of Free Support Groups; Knowledgeable local coordinators who will offer support, information, and resources; Guide to Resources on [employer]; Calendar of events in the  mood disorders community; Latest News and Research; and [facility] & [facility] [place] [address] for [employer]. Remember - You are not alone; You are not to blame; With help, you will be well. 8-250-261-PPD(9395). WWW. POSTPARTUM. NET   · Writing or talking about death, such as writing suicide notes or talking about guns, knives, or pills. Keep the numbers for these national suicide hotlines: 7-318-773-TALK (5-592.460.4926) and 7-805-XGSHVVW (2-280.273.7475). If you or someone you know talks about suicide or feeling hopeless, get help right away. Other instructions  · If you breast-feed your baby, you may be more comfortable while you are healing if you place the baby so that he or she is not resting on your belly. Try tucking your baby under your arm, with his or her body along the side you will be feeding on. Support your baby's upper body with your arm. With that hand you can control your baby's head to bring his or her mouth to your breast. This is sometimes called the football hold. Follow-up care is a key part of your treatment and safety. Be sure to make and go to all appointments, and call your doctor if you are having problems. It's also a good idea to know your test results and keep a list of the medicines you take. When should you call for help? Call 911 anytime you think you may need emergency care. For example, call if:  · You are thinking of hurting yourself, your baby, or anyone else.   · You passed out (lost consciousness). · You have symptoms of a blood clot in your lung (called a pulmonary embolism). These may include:  · Sudden chest pain. · Trouble breathing. · Coughing up blood. Call your doctor now or seek immediate medical care if:    · You have severe vaginal bleeding. · You are soaking through a pad each hour for 2 or more hours. · Your vaginal bleeding seems to be getting heavier or is still bright red 4 days after delivery. · You are dizzy or lightheaded, or you feel like you may faint. · You are vomiting or cannot keep fluids down. · You have a fever. · You have new or more belly pain. · You have loose stitches, or your incision comes open. · You have symptoms of infection, such as:  · Increased pain, swelling, warmth, or redness. · Red streaks leading from the incision. · Pus draining from the incision. · A fever  · You pass tissue (not just blood). · Your vaginal discharge smells bad. · Your belly feels tender or full and hard. · Your breasts are continuously painful or red. · You feel sad, anxious, or hopeless for more than a few days. · You have sudden, severe pain in your belly. · You have symptoms of a blood clot in your leg (called a deep vein thrombosis), such as:  · Pain in your calf, back of the knee, thigh, or groin. · Redness and swelling in your leg or groin. · You have symptoms of preeclampsia, such as:  · Sudden swelling of your face, hands, or feet. · New vision problems (such as dimness or blurring). · A severe headache. · Your blood pressure is higher than it should be or rises suddenly. · You have new nausea or vomiting. Watch closely for changes in your health, and be sure to contact your doctor if you have any problems. Additional Information:  Learning About Hypertensive Disorders After Childbirth    What is preeclampsia?      A woman with preeclampsia has blood pressure that is higher than usual. She may also have other serious symptoms. Preeclampsia can be dangerous. When it is severe, it can cause seizures (eclampsia) or liver or kidney damage. When the liver is affected, some women get HELLP syndrome, a blood-clotting and bleeding problem. HELLP can come on quickly and can be deadly. This is why your doctor checks you and your baby often. Preeclampsia usually occurs after 20 weeks of pregnancy. In rare cases, it is first noted right after childbirth. Most often, it starts near the end of pregnancy and goes away after childbirth. What are the symptoms? Mild preeclampsia usually doesn't cause symptoms. But preeclampsia can cause rapid weight gain and sudden swelling of the hands and face. Severe preeclampsia does cause symptoms. It can cause a very bad headache and trouble seeing and breathing. It also can cause belly pain. You may also urinate less than usual.    If you have new preeclampsia symptoms after you go home from the hospital, call your doctor right away. What can you expect after you have had preeclampsia? In the hospital  After the baby and the placenta are delivered, preeclampsia usually starts to improve. Most women get better in the first few days after childbirth. After having preeclampsia, you still have a risk of seizures for a day or more after childbirth. (Very rarely, seizures happen later on.) So your doctor may have you take magnesium sulfate for a day or more to prevent seizures. You may also take medicine to lower your blood pressure. When you go home  Your blood pressure will most likely return to normal a few days after delivery. Your doctor will want to check your blood pressure sometime in the first week after you leave the hospital.    Some women still have high blood pressure 6 weeks after childbirth. But most return to normal levels over the long term. · Take and record your blood pressure at home if your doctor tells you to.   · Learn the importance of the two measures of blood pressure (such as 120 over 80, or 120/80). The first number is the systolic pressure. This is the force of blood on the artery walls as the heart pumps. The second number is the diastolic pressure. This is the force of blood on the artery walls between heartbeats, when the heart is at rest. You have a choice of monitors to use. Manual monitor: You pump up the cuff and use a stethoscope to listen for your  Pulse. · Electronic monitor: The cuff inflates, and a gauge shows your pulse rate. · To take your blood pressure:  · Ask your doctor to check your blood pressure monitor to be sure that it is accurate and that the cuff fits you. Also ask your doctor to watch you use it, to make sure that you are using it right. · You should not eat, use tobacco products, or use medicine known to raise blood pressure (such as some nasal decongestant sprays) before you take your blood pressure. · Avoid taking your blood pressure if you have just exercised or are nervous or upset. Rest at least 15 minutes before you take your blood pressure. · Be safe with medicines. If you take medicine, take it exactly as prescribed. Call your doctor if you think you are having a problem with your medicine. · Do not smoke. Quitting smoking will help lower your blood pressure and improve your baby's growth and health. If you need help quitting, talk to your doctor about stop-smoking programs and medicines. These can increase your chances of quitting for good. · Eat a balanced and healthy diet that has lots of fruits and vegetables. Long-term health   After you have had preeclampsia, you have a higher-than-average risk of heart disease, stroke, and kidney disease. This may be because the same things that cause preeclampsia also cause heart and kidney disease. To protect your health, work with your doctor on living a heart-healthy lifestyle and getting the checkups you need.  Your doctor may also want you to check your blood pressure at home.    Follow-up care is a key part of your treatment and safety. Be sure to make and go to all appointments, and call your doctor if you are having problems. It's also a good idea to know your test results and keep a list of the medicines you take. Preventing Infection at Home    We care about preventing infection and avoiding the spread of germs - not only when you are in the hospital but also when you return home. When you return home from the hospital, it's important to take the following steps to help prevent infection and avoid spreading germs that could infect you and others. Ask everyone in your home to follow these guidelines, too. Clean Your Hands  · Clean your hands whenever your hands are visibly dirty, before you eat, before or after touching your mouth, nose or eyes, and before preparing food. Clean them after contact with body fluids, using the restroom, touching animals or changing diapers. · When washing hands, wet them with warm water and work up a lather. Rub hands for at least 15 seconds, then rinse them and pat them dry with a clean towel or paper towel. · When using hand sanitizers, it should take about 15 seconds to rub your hands dry. If not, you probably didn't apply enough . Cover Your Sneeze or Cough  Germs are released into the air whenever you sneeze or cough. To prevent the spread of infection:  · Turn away from other people before coughing or sneezing. · Cover your mouth or nose with a tissue when you cough or sneeze. Put the tissue in the trash. · If you don't have a tissue, cough or sneeze into your upper sleeve, not your hands. · Always clean your hands after coughing or sneezing. Care for Wounds  Your skin is your body's first line of defense against germs, but an open wound leaves an easy way for germs to enter your body.  To prevent infection:  · Clean your hands before and after changing wound dressings, and wear gloves to change dressings if recommended by your doctor. · Take special care with IV lines or other devices inserted into the body. If you must touch them, clean your hands first.  · Follow any specific instructions from your doctor to care for your wounds. Contact your doctor if you experience any signs of infection, such as fever or increased redness at the surgical or wound site. Keep a Clean Home  · Clean or wipe commonly touched hard surfaces like door handles, sinks, tabletops, phones and TV remotes. · Use products labeled \"disinfectant\" to kill harmful bacteria and viruses. · Use a clean cloth or paper towel to clean and dry surfaces. Wiping surfaces with a dirty dishcloth, sponge or towel will only spread germs. · Never share toothbrushes, stinson, drinking glasses, utensils, razor blades, face cloths or bath towels to avoid spreading germs. · Be sure that the linens that you sleep on are clean. · Keep pets away from wounds and wash your hands after touching pets, their toys or bedding. We care about you and your health. Remember, preventing infections is a   team effort between you, your family, friends and health care providers. Postpartum Support    PARENTS:  Are you feeling sad or depressed? Is it difficult for you to enjoy yourself? Do you feel more irritable or tense? Do you feel anxious or panicky? Are you having difficulty bonding with your baby? Do you feel as if you are \"out of control\" or \"going crazy\"? Are you worried that you might hurt your baby or yourself? FAMILIES: Do you worry that something is wrong but don't know how to help? Do you think that your partner or spouse is having problems coping? Are you worried that it may never get better? While many women experience some mild mood change or \"the blues\" during or after the birth of a child, 1 in 9 women experience more significant symptoms of depression or anxiety. 1 in 10 Dads become depressed during the first year.     Things you can do  Being a good parent includes taking care of yourself. If you take care of yourself, you will be able to take better care of your baby and your family. · Talk to a counselor or healthcare provider who has training in  mood and anxiety problems. · Learn as much as you can about pregnancy and postpartum depression and anxiety. · Get support from family and friends. Ask for help when you need it. · Join a support group in your area or online. · Keep active by walking, stretching or whatever form of exercise helps you to feel better. · Get enough rest and time for yourself. · Eat a healthy diet. · Don't give up! It may take more than one try to get the right help you need. These are general instructions for a healthy lifestyle:    No smoking/ No tobacco products/ Avoid exposure to second hand smoke    Surgeon General's Warning:  Quitting smoking now greatly reduces serious risk to your health. Obesity, smoking, and sedentary lifestyle greatly increases your risk for illness    A healthy diet, regular physical exercise & weight monitoring are important for maintaining a healthy lifestyle    Recognize signs and symptoms of STROKE:    F-face looks uneven    A-arms unable to move or move unevenly    S-speech slurred or non-existent    T-time-call 911 as soon as signs and symptoms begin - DO NOT go       back to bed or wait to see if you get better - TIME IS BRAIN. I have had the opportunity to make my options or choices for discharge. I have received and understand these instructions.

## 2019-09-25 ENCOUNTER — OFFICE VISIT (OUTPATIENT)
Dept: OBGYN CLINIC | Age: 34
End: 2019-09-25

## 2019-09-25 VITALS
SYSTOLIC BLOOD PRESSURE: 136 MMHG | BODY MASS INDEX: 33.04 KG/M2 | WEIGHT: 218 LBS | DIASTOLIC BLOOD PRESSURE: 71 MMHG | HEIGHT: 68 IN

## 2019-09-25 RX ORDER — ACETAMINOPHEN AND CODEINE PHOSPHATE 120; 12 MG/5ML; MG/5ML
1 SOLUTION ORAL DAILY
Qty: 3 PACKAGE | Refills: 1 | Status: SHIPPED | OUTPATIENT
Start: 2019-09-25 | End: 2020-01-20 | Stop reason: ALTCHOICE

## 2019-09-25 NOTE — PROGRESS NOTES
Postpartum evaluation    [place] is a 30 y.o. female who presents for a postpartum exam.  Mother has new primary breast cancer 22 years since original dx    She is now six weeks post primary  section. Her baby is doing well. She has had no menses since delivery. She has had the following significant problems since her delivery: none    The patient is breast feeding without difficulty. The patient would like to use nothing for birth control. She is currently taking: vitamin    She is due for her next AE in 3-4 months.      Visit Vitals  /71   Ht 5' 8\" (1.727 m)   Wt 218 lb (98.9 kg)   BMI 33.15 kg/m²       PHYSICAL EXAMINATION    Constitutional  · Appearance: well-nourished, well developed, alert, in no acute distress    HENT  · Head and Face: appears normal    Neck  · Inspection/Palpation: normal appearance, no masses or tenderness  · Lymph Nodes: no lymphadenopathy present  · Thyroid: gland size normal, nontender, no nodules or masses present on palpation    Breasts  · Inspection of Breasts: breasts symmetrical, no skin changes, no discharge present, nipple appearance normal, no skin retraction present  · Palpation of Breasts and Axillae: no masses present on palpation, no breast tenderness  · Axillary Lymph Nodes: no lymphadenopathy present    Gastrointestinal  · Abdominal Examination: abdomen non-tender to palpation, normal bowel sounds, no masses present  · Liver and spleen: no hepatomegaly present, spleen not palpable  · Hernias: no hernias identified    Genitourinary  · External Genitalia: normal appearance for age, no discharge present, no tenderness present, no inflammatory lesions present, no masses present, no atrophy present  · Vagina: normal vaginal vault without central or paravaginal defects, no discharge present, no inflammatory lesions present, no masses present  · Bladder: non-tender to palpation  · Urethra: appears normal  · Cervix: normal   · Uterus: normal size, shape and consistency  · Adnexa: no adnexal tenderness present, no adnexal masses present  · Perineum: perineum within normal limits, no evidence of trauma, no rashes or skin lesions present  · Anus: anus within normal limits, no hemorrhoids present  · Inguinal Lymph Nodes: no lymphadenopathy present    Skin  · General Inspection: no rash, no lesions identified    Neurologic/Psychiatric  · Mental Status:  · Orientation: grossly oriented to person, place and time  · Mood and Affect: mood normal, affect appropriate    Assessment:  Normal postpartum check    Plan:  RTO for AE.  micronor

## 2019-09-25 NOTE — PATIENT INSTRUCTIONS
Breastfeeding: Care Instructions  Overview    Breastfeeding has many benefits. It may lower your baby's chances of getting an infection. It also may make it less likely that your baby will have problems such as diabetes and obesity later in life. Breastfeeding also helps you bond with your baby. In the first days after birth, your breasts make a thick, yellow liquid called colostrum. This liquid gives your baby nutrients and antibodies against infection. It is all that babies need in the first days after birth. Your breasts will fill with milk a few days after the birth. Breastfeeding is a skill that gets better with practice. Be patient with yourself and your baby. If you have trouble, you can get help and keep breastfeeding. Follow-up care is a key part of your treatment and safety. Be sure to make and go to all appointments, and call your doctor if you are having problems. It's also a good idea to know your test results and keep a list of the medicines you take. How can you care for yourself at home? · Breastfeed your baby whenever he or she is hungry. In the first 2 weeks, your baby will feed about every 1 to 3 hours. That often works out to about 8 to 12 times in a 24-hour period. This will help you keep up your supply of milk. Signs that your baby is hungry include:  ? Sucking on his or her hands. ? [place] his or her lips. ? Turning his or her head toward your breast.  · Put a bed pillow or a nursing pillow on your lap to support your arms and your baby. · Hold your baby in a comfortable position. ? You can hold your baby in several ways. One of the most common positions is the cradle hold. One arm supports your baby, with his or her head in the bend of your elbow. Your open hand supports your baby's bottom or back. Your baby's belly lies against yours. ? If you had your baby by , or , try the football hold. This position keeps your baby off your belly.  Tuck your baby under your arm, with his or her body along the side you will be feeding on. Support your baby's upper body with your arm. With that hand you can control your baby's head to bring his or her mouth to your breast.  ? Try different positions with each feeding. If you are having problems, ask for help from your doctor or a lactation consultant. · To get your baby to latch on:  ? Support and narrow your breast with one hand using a \"U hold,\" with your thumb on the outer side of your breast and your fingers on the inner side. You can also use a \"C hold,\" with all your fingers below the nipple and your thumb above it. Try the different holds to get the deepest latch for whichever breastfeeding position you use. Your other arm is behind your baby's back, with your hand supporting the base of the baby's head. Position your fingers and thumb to point toward your baby's ears. ? You can touch your baby's lower lip with your nipple to get your baby to open his or her mouth. Wait until your baby opens up really wide, like a big yawn. Then be sure to bring the baby quickly to your breast--not your breast to the baby. As you bring your baby toward your breast, use your other hand to support the breast and guide it into his or her mouth. ? Both the nipple and a large portion of the darker area around the nipple (areola) should be in the baby's mouth. The baby's lips should be flared outward, not folded in (inverted). ? Listen for a regular sucking and swallowing pattern while the baby is feeding. If you cannot see or hear a swallowing pattern, watch the baby's ears, which will wiggle slightly when the baby swallows. If the baby's nose appears to be blocked by your breast, bring your baby's body closer to you. This will help tilt the baby's head back slightly, so just the edge of one nostril is clear for breathing. ?  When your baby is latched, you can usually remove your hand from supporting your breast and bring it under your baby to cradle him or her. Now just relax and breastfeed your baby. · You will know that your baby is feeding well when:  ? His or her mouth covers a lot of the areola, and the lips are flared out.  ? His or her chin and nose rest against your breast.  ? Sucking is deep and rhythmic, with short pauses. ? You are able to see and hear your baby swallowing. ? You do not feel pain in your nipple. · Offer both breasts to your baby at each feeding. Each time you breastfeed, switch which breast you start with. · Anytime you need to remove your baby from the breast, put one finger in the corner of his or her mouth. Push your finger between your baby's gums to gently break the seal. If you do not break the tight seal before you remove your baby, your nipples can become sore, cracked, or bruised. · After feeding your baby, gently pat his or her back to let out any swallowed air. After your baby burps, offer the breast again, or offer the other breast. Sometimes a baby will want to keep feeding after being burped. When should you call for help? Call your doctor now or seek immediate medical care if:    · You have symptoms of a breast infection, such as:  ? Increased pain, swelling, redness, or warmth around a breast.  ? Red streaks extending from the breast.  ? Pus draining from a breast.  ? A fever.     · Your baby has no wet diapers for 6 hours.    Watch closely for changes in your health, and be sure to contact your doctor if:    · Your baby has trouble latching on to your breast.     · You continue to have pain or discomfort when breastfeeding.     · You have other questions or concerns. Where can you learn more? Go to http://jared-leia.info/. Enter P492 in the search box to learn more about \"Breastfeeding: Care Instructions. \"  Current as of: May 29, 2019  Content Version: 12.2  © 0653-5968 [employer].  Care instructions adapted under license by [employer] (which disclaims liability or warranty for this information). If you have questions about a medical condition or this instruction, always ask your healthcare professional. [place] [address] any warranty or liability for your use of this information.

## 2020-01-20 ENCOUNTER — OFFICE VISIT (OUTPATIENT)
Dept: INTERNAL MEDICINE CLINIC | Age: 35
End: 2020-01-20

## 2020-01-20 VITALS
OXYGEN SATURATION: 96 % | BODY MASS INDEX: 41.04 KG/M2 | HEIGHT: 68 IN | TEMPERATURE: 97.9 F | SYSTOLIC BLOOD PRESSURE: 130 MMHG | DIASTOLIC BLOOD PRESSURE: 84 MMHG | WEIGHT: 270.8 LBS | RESPIRATION RATE: 14 BRPM | HEART RATE: 77 BPM

## 2020-01-20 DIAGNOSIS — J02.9 SORE THROAT: Primary | ICD-10-CM

## 2020-01-20 DIAGNOSIS — J01.10 ACUTE FRONTAL SINUSITIS, RECURRENCE NOT SPECIFIED: ICD-10-CM

## 2020-01-20 DIAGNOSIS — J40 BRONCHITIS: ICD-10-CM

## 2020-01-20 DIAGNOSIS — J45.21 MILD INTERMITTENT REACTIVE AIRWAY DISEASE WITH ACUTE EXACERBATION: ICD-10-CM

## 2020-01-20 DIAGNOSIS — H61.21 IMPACTED CERUMEN OF RIGHT EAR: ICD-10-CM

## 2020-01-20 LAB
S PYO AG THROAT QL: NEGATIVE
VALID INTERNAL CONTROL?: YES

## 2020-01-20 RX ORDER — BENZONATATE 200 MG/1
200 CAPSULE ORAL
Qty: 30 CAP | Refills: 0 | Status: SHIPPED | OUTPATIENT
Start: 2020-01-20 | End: 2020-01-30

## 2020-01-20 RX ORDER — ALBUTEROL SULFATE 90 UG/1
2 AEROSOL, METERED RESPIRATORY (INHALATION)
Qty: 1 INHALER | Refills: 0 | Status: SHIPPED | OUTPATIENT
Start: 2020-01-20 | End: 2020-02-11

## 2020-01-20 RX ORDER — AZITHROMYCIN 250 MG/1
250 TABLET, FILM COATED ORAL SEE ADMIN INSTRUCTIONS
Qty: 6 TAB | Refills: 0 | Status: SHIPPED | OUTPATIENT
Start: 2020-01-20 | End: 2020-01-25

## 2020-01-20 NOTE — PROGRESS NOTES
HISTORY OF PRESENT ILLNESS  [place] is a 30 y.o. female. HPI  Upper respiratory illness:  [place] presents with complaints of congestion, sore throat, post nasal drip, cough described as harsh, productive of green sputum, barking, headache, generalized sinus pain, right ear fullness, green nasal discharge, pain while swallowing and hoarseness for 1 week. no nausea and no vomiting . she has not had  myalgias, fever and chills. Symptoms are moderate. Over-the-counter remedies including Dayquil   has been used with poor relief of symptoms. Has been noting some upper chest tightness with coughing spasms and has been feeling slightly short of breath at times. Drinking plenty of fluids: yes  Asthma?:  Yes -- has had exercise induced asthma in past.  non-smoker  Contacts with similar infections: yes -- her 11 month old son has been ill with similar symptoms after being exposed to several ill children in his . Review of Systems   Constitutional: Positive for malaise/fatigue. Negative for chills and fever. HENT: Positive for congestion, hearing loss, sinus pain and sore throat. Respiratory: Positive for cough, sputum production and shortness of breath. Cardiovascular: Negative for chest pain and palpitations. Gastrointestinal: Negative for nausea and vomiting. Musculoskeletal: Negative for myalgias. Skin: Negative for rash. Neurological: Positive for headaches. Negative for dizziness and tingling. /84 (BP 1 Location: Left arm, BP Patient Position: Sitting)   Pulse 77   Temp 97.9 °F (36.6 °C) (Oral)   Resp 14   Ht 5' 8\" (1.727 m)   Wt 270 lb 12.8 oz (122.8 kg)   SpO2 96%   Breastfeeding No   BMI 41.17 kg/m²   Physical Exam  Vitals signs and nursing note reviewed. Constitutional:       Appearance: Normal appearance. She is obese. HENT:      Head: Normocephalic and atraumatic.       Right Ear: Tympanic membrane, ear canal and external ear normal. There is impacted cerumen. Left Ear: Tympanic membrane, ear canal and external ear normal.      Nose: Congestion present. Right Turbinates: Swollen. Left Turbinates: Swollen. Right Sinus: Frontal sinus tenderness present. Mouth/Throat:      Mouth: Mucous membranes are moist.      Pharynx: Posterior oropharyngeal erythema present. Neck:      Musculoskeletal: Normal range of motion and neck supple. Cardiovascular:      Rate and Rhythm: Normal rate and regular rhythm. Pulmonary:      Effort: Pulmonary effort is normal.      Breath sounds: Wheezing (faint scattered) present. Comments: Upper chest congestion with loose cough  Skin:     General: Skin is warm and dry. Neurological:      General: No focal deficit present. Mental Status: She is alert and oriented to person, place, and time. Psychiatric:         Mood and Affect: Mood normal.         Behavior: Behavior normal.         ASSESSMENT and PLAN  Diagnoses and all orders for this visit:    1. Sore throat  -     AMB POC RAPID STREP A - negative    2. Acute frontal sinusitis, recurrence not specified  -     azithromycin (ZITHROMAX) 250 mg tablet; Take 1 Tab by mouth See Admin Instructions for 5 days.  -     guaiFENesin-dextromethorphan SR (MUCINEX DM) 600-30 mg per tablet; Take 1 Tab by mouth every twelve (12) hours as needed for Cough. 3. Bronchitis  -     azithromycin (ZITHROMAX) 250 mg tablet; Take 1 Tab by mouth See Admin Instructions for 5 days.  -     guaiFENesin-dextromethorphan SR (MUCINEX DM) 600-30 mg per tablet; Take 1 Tab by mouth every twelve (12) hours as needed for Cough. -     benzonatate (TESSALON) 200 mg capsule; Take 1 Cap by mouth three (3) times daily as needed for Cough for up to 10 days. 4. Mild intermittent reactive airway disease with acute exacerbation  -     albuterol (PROVENTIL HFA, VENTOLIN HFA, PROAIR HFA) 90 mcg/actuation inhaler;  Take 2 Puffs by inhalation every six (6) hours as needed for Wheezing. 5. Impacted cerumen of right ear -- large amount of cerumen removed via irrigation; patient tolerated well.   -     REMOVAL IMPACTED CERUMEN IRRIGATION/LVG UNILAT      lab results and schedule of future lab studies reviewed with patient  reviewed diet, exercise and weight control  reviewed medications and side effects in detail

## 2020-01-20 NOTE — PATIENT INSTRUCTIONS
Earwax Blockage: Care Instructions Your Care Instructions Earwax is a natural substance that protects the ear canal. Normally, earwax drains from the ears and does not cause problems. Sometimes earwax builds up and hardens. Earwax blockage (also called cerumen impaction) can cause some loss of hearing and pain. When wax is tightly packed, you will need to have your doctor remove it. Follow-up care is a key part of your treatment and safety. Be sure to make and go to all appointments, and call your doctor if you are having problems. It's also a good idea to know your test results and keep a list of the medicines you take. How can you care for yourself at home? · Do not try to remove earwax with cotton swabs, fingers, or other objects. This can make the blockage worse and damage the eardrum. · If your doctor recommends that you try to remove earwax at home: ? Soften and loosen the earwax with warm mineral oil. You also can try hydrogen peroxide mixed with an equal amount of room temperature water. Place 2 drops of the fluid, warmed to body temperature, in the ear two times a day for up to 5 days. ? Once the wax is loose and soft, all that is usually needed to remove it from the ear canal is a gentle, warm shower. Direct the water into the ear, then tip your head to let the earwax drain out. Dry your ear thoroughly with a hair dryer set on low. Hold the dryer several inches from your ear. ? If the warm mineral oil and shower do not work, use an over-the-counter wax softener. Read and follow all instructions on the label. After using the wax softener, use an ear syringe to gently flush the ear. Make sure the flushing solution is body temperature. Cool or hot fluids in the ear can cause dizziness. When should you call for help? Call your doctor now or seek immediate medical care if: 
  · Pus or blood drains from your ear.  
  · Your ears are ringing or feel full.  
  · You have a loss of hearing.  Watch closely for changes in your health, and be sure to contact your doctor if: 
  · You have pain or reduced hearing after 1 week of home treatment.  
  · You have any new symptoms, such as nausea or balance problems. Where can you learn more? Go to http://jared-leia.info/. Enter I948 in the search box to learn more about \"Earwax Blockage: Care Instructions. \" Current as of: June 26, 2019 Content Version: 12.2 © 9575-2021 [employer]. Care instructions adapted under license by [employer] (which disclaims liability or warranty for this information). If you have questions about a medical condition or this instruction, always ask your healthcare professional. [address] any warranty or liability for your use of this information. Sinusitis: Care Instructions Your Care Instructions Sinusitis is an infection of the lining of the sinus cavities in your head. Sinusitis often follows a cold. It causes pain and pressure in your head and face. In most cases, sinusitis gets better on its own in 1 to 2 weeks. But some mild symptoms may last for several weeks. Sometimes antibiotics are needed. Follow-up care is a key part of your treatment and safety. Be sure to make and go to all appointments, and call your doctor if you are having problems. It's also a good idea to know your test results and keep a list of the medicines you take. How can you care for yourself at home? · Take an over-the-counter pain medicine, such as acetaminophen (Tylenol), ibuprofen (Advil, Motrin), or naproxen (Aleve). Read and follow all instructions on the label. · If the doctor prescribed antibiotics, take them as directed. Do not stop taking them just because you feel better. You need to take the full course of antibiotics. · Be careful when taking over-the-counter cold or flu medicines and Tylenol at the same time.  Many of these medicines have acetaminophen, which is Tylenol. Read the labels to make sure that you are not taking more than the recommended dose. Too much acetaminophen (Tylenol) can be harmful. · Breathe warm, moist air from a steamy shower, a hot bath, or a sink filled with hot water. Avoid cold, dry air. Using a humidifier in your home may help. Follow the directions for cleaning the machine. · Use saline (saltwater) nasal washes to help keep your nasal passages open and wash out mucus and bacteria. You can buy saline nose drops at a grocery store or drugstore. Or you can make your own at home by adding 1 teaspoon of salt and 1 teaspoon of baking soda to 2 cups of distilled water. If you make your own, fill a bulb syringe with the solution, insert the tip into your nostril, and squeeze gently. [place] your nose. · Put a hot, wet towel or a warm gel pack on your face 3 or 4 times a day for 5 to 10 minutes each time. · Try a decongestant nasal spray like oxymetazoline (Afrin). Do not use it for more than 3 days in a row. Using it for more than 3 days can make your congestion worse. When should you call for help? Call your doctor now or seek immediate medical care if: 
  · You have new or worse swelling or redness in your face or around your eyes.  
  · You have a new or higher fever.  
 Watch closely for changes in your health, and be sure to contact your doctor if: 
  · You have new or worse facial pain.  
  · The mucus from your nose becomes thicker (like pus) or has new blood in it.  
  · You are not getting better as expected. Where can you learn more? Go to http://jared-leia.info/. Enter D014 in the search box to learn more about \"Sinusitis: Care Instructions. \" Current as of: October 21, 2018 Content Version: 12.2 © 6566-4626 [employer].  Care instructions adapted under license by [employer] (which disclaims liability or warranty for this information). If you have questions about a medical condition or this instruction, always ask your healthcare professional. [address] any warranty or liability for your use of this information. Bronchitis: Care Instructions Your Care Instructions Bronchitis is inflammation of the bronchial tubes, which carry air to the lungs. The tubes swell and produce mucus, or phlegm. The mucus and inflamed bronchial tubes make you cough. You may have trouble breathing. Most cases of bronchitis are caused by viruses like those that cause colds. Antibiotics usually do not help and they may be harmful. Bronchitis usually develops rapidly and lasts about 2 to 3 weeks in otherwise healthy people. Follow-up care is a key part of your treatment and safety. Be sure to make and go to all appointments, and call your doctor if you are having problems. It's also a good idea to know your test results and keep a list of the medicines you take. How can you care for yourself at home? · Take all medicines exactly as prescribed. Call your doctor if you think you are having a problem with your medicine. · Get some extra rest. 
· Take an over-the-counter pain medicine, such as acetaminophen (Tylenol), ibuprofen (Advil, Motrin), or naproxen (Aleve) to reduce fever and relieve body aches. Read and follow all instructions on the label. · Do not take two or more pain medicines at the same time unless the doctor told you to. Many pain medicines have acetaminophen, which is Tylenol. Too much acetaminophen (Tylenol) can be harmful. · Take an over-the-counter cough medicine that contains dextromethorphan to help quiet a dry, hacking cough so that you can sleep. Avoid cough medicines that have more than one active ingredient. Read and follow all instructions on the label.  
· Breathe moist air from a humidifier, hot shower, or sink filled with hot water. The heat and moisture will thin mucus so you can cough it out. · Do not smoke. Smoking can make bronchitis worse. If you need help quitting, talk to your doctor about stop-smoking programs and medicines. These can increase your chances of quitting for good. When should you call for help? Call 911 anytime you think you may need emergency care. For example, call if: 
  · You have severe trouble breathing.  
 Call your doctor now or seek immediate medical care if: 
  · You have new or worse trouble breathing.  
  · You cough up dark brown or bloody mucus (sputum).  
  · You have a new or higher fever.  
  · You have a new rash.  
 Watch closely for changes in your health, and be sure to contact your doctor if: 
  · You cough more deeply or more often, especially if you notice more mucus or a change in the color of your mucus.  
  · You are not getting better as expected. Where can you learn more? Go to http://jared-leia.info/. Enter H333 in the search box to learn more about \"Bronchitis: Care Instructions. \" Current as of: June 9, 2019 Content Version: 12.2 © 5035-0913 [employer]. Care instructions adapted under license by [employer] (which disclaims liability or warranty for this information). If you have questions about a medical condition or this instruction, always ask your healthcare professional. [address] any warranty or liability for your use of this information.

## 2020-01-31 ENCOUNTER — TELEPHONE (OUTPATIENT)
Dept: INTERNAL MEDICINE CLINIC | Age: 35
End: 2020-01-31

## 2020-01-31 ENCOUNTER — OFFICE VISIT (OUTPATIENT)
Dept: INTERNAL MEDICINE CLINIC | Age: 35
End: 2020-01-31

## 2020-01-31 VITALS
SYSTOLIC BLOOD PRESSURE: 108 MMHG | HEIGHT: 68 IN | BODY MASS INDEX: 40.66 KG/M2 | WEIGHT: 268.25 LBS | HEART RATE: 65 BPM | DIASTOLIC BLOOD PRESSURE: 72 MMHG | OXYGEN SATURATION: 97 % | TEMPERATURE: 98.5 F | RESPIRATION RATE: 18 BRPM

## 2020-01-31 DIAGNOSIS — J01.11 ACUTE RECURRENT FRONTAL SINUSITIS: Primary | ICD-10-CM

## 2020-01-31 RX ORDER — DOXYCYCLINE 100 MG/1
100 CAPSULE ORAL 2 TIMES DAILY
Qty: 10 CAP | Refills: 0 | Status: SHIPPED | OUTPATIENT
Start: 2020-01-31 | End: 2021-05-05 | Stop reason: SDUPTHER

## 2020-01-31 NOTE — TELEPHONE ENCOUNTER
----- Message from Debby Tyson sent at 1/31/2020 11:32 AM EST -----  Regarding: /telephone  Contact: [phone]  Appointment not available    Caller's first and last name and relationship to patient (if not the patient):      Best contact number:[phone]      Preferred date and time: anytime today      Scheduled appointment date and time:      Reason for appointment: pt was seen on January 20 for sinus infection, however, pt is still not feeling better. Please call for an appt today.       Details to clarify the request:      Debby Tyson

## 2020-01-31 NOTE — PROGRESS NOTES
Assessment and Plan   Diagnoses and all orders for this visit:    1. Acute recurrent frontal sinusitis  -     doxycycline (VIBRAMYCIN) 100 mg capsule; Take 1 Cap by mouth two (2) times a day for 5 days. Also recommend symptomatic treatment with nasal steroids, nasal saline spray, Sudafed, Tylenol ibuprofen. If no improvement after second round of antibiotics, recommend ENT referral      Benefits, risks, possible drug interactions, and side effects of all new medications were reviewed with the patient. Pt verbalized understanding. Return to clinic: As needed    Branden Bates MD  [facility]  1/31/2020    Future Appointments   Date Time Provider Bob Reardon   2/13/2020  8:10 AM Marysol Hurtado MD [address] [place] [address]        Subjective   Chief Complaint   Sinus congestion    Gilmaanusha Amin is a 30 y.o. female     Was seen in clinic on the 20th for a one-week history of sinus congestion and cough. Was discharged with a Z-Ezequiel. Patient reports that her symptoms improved somewhat but returned yesterday. Reports increased sinus congestion and drainage of green phlegm and mild right ear pain. Denies any fever, chills, cough, shortness of breath, chest pain. Review of Systems   Constitutional: Negative for chills and fever. HENT: Positive for congestion. Respiratory: Negative for shortness of breath. Cardiovascular: Negative for chest pain. Objective   Vitals:       Visit Vitals  /72 (BP 1 Location: Left arm, BP Patient Position: Sitting)   Pulse 65   Temp 98.5 °F (36.9 °C) (Oral)   Resp 18   Ht 5' 8\" (1.727 m)   Wt 268 lb 4 oz (121.7 kg)   SpO2 97%   BMI 40.79 kg/m²        Physical Exam  Constitutional:       Appearance: Normal appearance. HENT:      Right Ear: Tympanic membrane, ear canal and external ear normal.      Left Ear: Tympanic membrane, ear canal and external ear normal.      Nose: Congestion present.       Mouth/Throat:      Mouth: Mucous membranes are moist.      Pharynx: No posterior oropharyngeal erythema. Eyes:      Conjunctiva/sclera: Conjunctivae normal.   Cardiovascular:      Rate and Rhythm: Normal rate and regular rhythm. Heart sounds: No murmur. No friction rub. No gallop. Pulmonary:      Effort: No respiratory distress. Breath sounds: No wheezing, rhonchi or rales. Lymphadenopathy:      Cervical: Cervical adenopathy present. Neurological:      Mental Status: She is alert.           Voice recognition software is utilized and this note may contain transcription errors

## 2020-01-31 NOTE — TELEPHONE ENCOUNTER
Appt schedule for today at 1:30pm with Dr. Tray Beauchamp. Please advise pt of this appt. Thank you!

## 2020-02-11 DIAGNOSIS — J45.21 MILD INTERMITTENT REACTIVE AIRWAY DISEASE WITH ACUTE EXACERBATION: ICD-10-CM

## 2020-02-11 RX ORDER — ALBUTEROL SULFATE 90 UG/1
AEROSOL, METERED RESPIRATORY (INHALATION)
Qty: 6.7 INHALER | Refills: 0 | Status: SHIPPED | OUTPATIENT
Start: 2020-02-11

## 2020-02-13 ENCOUNTER — OFFICE VISIT (OUTPATIENT)
Dept: OBGYN CLINIC | Age: 35
End: 2020-02-13

## 2020-02-13 VITALS
DIASTOLIC BLOOD PRESSURE: 72 MMHG | HEIGHT: 68 IN | BODY MASS INDEX: 40.77 KG/M2 | SYSTOLIC BLOOD PRESSURE: 118 MMHG | WEIGHT: 269 LBS

## 2020-02-13 DIAGNOSIS — Z01.419 ENCOUNTER FOR GYNECOLOGICAL EXAMINATION (GENERAL) (ROUTINE) WITHOUT ABNORMAL FINDINGS: Primary | ICD-10-CM

## 2020-02-13 NOTE — PATIENT INSTRUCTIONS
Well Visit, Ages 25 to 48: Care Instructions  Your Care Instructions    Physical exams can help you stay healthy. Your doctor has checked your overall health and may have suggested ways to take good care of yourself. He or she also may have recommended tests. At home, you can help prevent illness with healthy eating, regular exercise, and other steps. Follow-up care is a key part of your treatment and safety. Be sure to make and go to all appointments, and call your doctor if you are having problems. It's also a good idea to know your test results and keep a list of the medicines you take. How can you care for yourself at home? · Reach and stay at a healthy weight. This will lower your risk for many problems, such as obesity, diabetes, heart disease, and high blood pressure. · Get at least 30 minutes of physical activity on most days of the week. Walking is a good choice. You also may want to do other activities, such as running, swimming, cycling, or playing tennis or team sports. Discuss any changes in your exercise program with your doctor. · Do not smoke or allow others to smoke around you. If you need help quitting, talk to your doctor about stop-smoking programs and medicines. These can increase your chances of quitting for good. · Talk to your doctor about whether you have any risk factors for sexually transmitted infections (STIs). Having one sex partner (who does not have STIs and does not have sex with anyone else) is a good way to avoid these infections. · Use birth control if you do not want to have children at this time. Talk with your doctor about the choices available and what might be best for you. · Protect your skin from too much sun. When you're outdoors from 10 a.m. to 4 p.m., stay in the shade or cover up with clothing and a hat with a wide brim. Wear sunglasses that block UV rays. Even when it's cloudy, put broad-spectrum sunscreen (SPF 30 or higher) on any exposed skin.   · See a dentist one or two times a year for checkups and to have your teeth cleaned. · Wear a seat belt in the car. Follow your doctor's advice about when to have certain tests. These tests can spot problems early. For everyone  · Cholesterol. Have the fat (cholesterol) in your blood tested after age 21. Your doctor will tell you how often to have this done based on your age, family history, or other things that can increase your risk for heart disease. · Blood pressure. Have your blood pressure checked during a routine doctor visit. Your doctor will tell you how often to check your blood pressure based on your age, your blood pressure results, and other factors. · Vision. Talk with your doctor about how often to have a glaucoma test.  · Diabetes. Ask your doctor whether you should have tests for diabetes. · Colon cancer. Your risk for colorectal cancer gets higher as you get older. Some experts say that adults should start regular screening at age 48 and stop at age 76. Others say to start before age 48 or continue after age 76. Talk with your doctor about your risk and when to start and stop screening. For women  · Breast exam and mammogram. Talk to your doctor about when you should have a clinical breast exam and a mammogram. Medical experts differ on whether and how often women under 50 should have these tests. Your doctor can help you decide what is right for you. · Cervical cancer screening test and pelvic exam. Begin with a Pap test at age 24. The test often is part of a pelvic exam. Starting at age 27, you may choose to have a Pap test, an HPV test, or both tests at the same time (called co-testing). Talk with your doctor about how often to have testing. · Tests for sexually transmitted infections (STIs). Ask whether you should have tests for STIs. You may be at risk if you have sex with more than one person, especially if your partners do not wear condoms.   For men  · Tests for sexually transmitted infections (STIs). Ask whether you should have tests for STIs. You may be at risk if you have sex with more than one person, especially if you do not wear a condom. · Testicular cancer exam. Ask your doctor whether you should check your testicles regularly. · Prostate exam. Talk to your doctor about whether you should have a blood test (called a PSA test) for prostate cancer. Experts differ on whether and when men should have this test. Some experts suggest it if you are older than 39 and are -American or have a father or brother who got prostate cancer when he was younger than 72. When should you call for help? Watch closely for changes in your health, and be sure to contact your doctor if you have any problems or symptoms that concern you. Where can you learn more? Go to http://jared-leia.info/. Enter P072 in the search box to learn more about \"Well Visit, Ages 25 to 48: Care Instructions. \"  Current as of: December 13, 2018  Content Version: 12.2  © 2458-0189 [employer]. Care instructions adapted under license by [employer] (which disclaims liability or warranty for this information). If you have questions about a medical condition or this instruction, always ask your healthcare professional. [place] [address] any warranty or liability for your use of this information.

## 2020-02-13 NOTE — PROGRESS NOTES
Micheal Hitchcock is a ,  30 y.o. female [facility] whose No LMP recorded. was on  who presents for her annual checkup. She is having no significant problems. With regard to the Gardisil vaccine, she has received all 3 injections. Menstrual status:    Her periods are minimal to nonexistent in flow. Per patient she stopped breast feeding in 2019. She states she has not had a menstrual cycle. She denies dysmenorrhea. She reports no premenstrual symptoms. Contraception:    The current method of family planning is none and She declines contraception and counseling. Per patient she did not take the mini pill that was prescribed. Has not been SA in weeks    Sexual history:    She  reports being sexually active and has had partner(s) who are Male. She reports using the following method of birth control/protection: None. Medical conditions:    Since her last annual GYN exam about 2019 ago, she has not the following changes in her health history: none. Pap and Mammogram History:    Her most recent Pap smear was normal/-HPV obtained 2018 year(s) ago. The patient has never had a mammogram.    The patient does have a family history of breast cancer. Past Medical History:   Diagnosis Date    Dysmenorrhea     Dr. Ledezma Comment Family history of breast cancer     mother age 28. seeing Dr. Graves Hidden    Gestational hypertension     past three weeks    HPV vaccine counseling     gardasil series complete    Knee pain, chronic     softball injuries.  cartillage damage    Patellofemoral arthralgia of both knees     hx of rowing, catching     Past Surgical History:   Procedure Laterality Date    HX  SECTION  2019    Son        Current Outpatient Medications   Medication Sig Dispense Refill    albuterol (PROVENTIL HFA, VENTOLIN HFA, PROAIR HFA) 90 mcg/actuation inhaler INHALE 2 PUFFS EVERY 6 HOURS AS NEEDED FOR WHEEZE 6.7 Inhaler 0    guaiFENesin-dextromethorphan SR (MUCINEX DM) 600-30 mg per tablet Take 1 Tab by mouth every twelve (12) hours as needed for Cough. 30 Tab 0     Allergies: Augmentin [amoxicillin-pot clavulanate]; Bactrim [sulfamethoprim];  Penicillins; and Sulfamethoxazole-trimethoprim   Social History     Socioeconomic History    Marital status:      Spouse name: Samina Otero Number of children: 0    Years of education: Not on file    Highest education level: Not on file   Occupational History    Occupation:      Employer: [id number] Occupation:      Employer: [id number] resource strain: Not on file    Food insecurity:     Worry: Not on file     Inability: Not on file   [employer] needs:     Medical: Not on file     Non-medical: Not on file   Tobacco Use    Smoking status: Never Smoker    Smokeless tobacco: Never Used   Substance and Sexual Activity    Alcohol use: Not Currently     Comment: 2-3 drinks per week    Drug use: No    Sexual activity: Yes     Partners: Male     Birth control/protection: None   Lifestyle    Physical activity:     Days per week: Not on file     Minutes per session: Not on file    Stress: Not on file   Relationships    Social connections:     Talks on phone: Not on file     Gets together: Not on file     Attends [employer] service: Not on file     Active member of club or organization: Not on file     Attends meetings of clubs or organizations: Not on file     Relationship status: Not on file    Intimate partner violence:     Fear of current or ex partner: Not on file     Emotionally abused: Not on file     Physically abused: Not on file     Forced sexual activity: Not on file   Other Topics Concern     Service Not Asked    Blood Transfusions Not Asked    Caffeine Concern Not Asked    Occupational Exposure Not Asked    Hobby Hazards Not Asked    Sleep Concern Not Asked    Stress Concern Not Asked    Weight Concern Not Asked    Special Diet Not Asked    Back Care Not Asked    Exercise Not Asked    Bike Helmet Not Asked    [address] Not Asked    Self-Exams Not Asked   Social History Narrative    Not on file     Tobacco History:  reports that she has never smoked. She has never used smokeless tobacco.  Alcohol Abuse:  reports previous alcohol use. Drug Abuse:  reports no history of drug use. Patient Active Problem List   Diagnosis Code    Family history of breast cancer Z80.3    Dysmenorrhea N94.6    At high risk for breast cancer Z91.89    Obesity, morbid ([facility] [address] E66.01    Postpartum care following  delivery Z39.2       Review of Systems - History obtained from the patient  Constitutional: negative for weight loss, fever, night sweats  HEENT: negative for hearing loss, earache, congestion, snoring, sorethroat  CV: negative for chest pain, palpitations, edema  Resp: negative for cough, shortness of breath, wheezing  GI: negative for change in bowel habits, abdominal pain, black or bloody stools  : negative for frequency, dysuria, hematuria, vaginal discharge  MSK: negative for back pain, joint pain, muscle pain  Breast: negative for breast lumps, nipple discharge, galactorrhea  Skin :negative for itching, rash, hives  Neuro: negative for dizziness, headache, confusion, weakness  Psych: negative for anxiety, depression, change in mood  Heme/lymph: negative for bleeding, bruising, pallor    Physical Exam    Visit Vitals  /72 (BP 1 Location: Right arm)   Ht 5' 8\" (1.727 m)   Wt 269 lb (122 kg)   Breastfeeding No Comment: No cycle yet. Patient stop breast feeding in 2019.    BMI 40.90 kg/m²       Constitutional  · Appearance: well-nourished, well developed, alert, in no acute distress    HENT  · Head and Face: appears normal    Neck  · Inspection/Palpation: normal appearance, no masses or tenderness  · Lymph Nodes: no lymphadenopathy present  · Thyroid: gland size normal, nontender, no nodules or masses present on palpation    Chest  · Respiratory Effort: breathing normal  · Auscultation: normal breath sounds    Cardiovascular  · Heart:  · Auscultation: regular rate and rhythm without murmur    Breasts  · Inspection of Breasts: breasts symmetrical, no skin changes, no discharge present, nipple appearance normal, no skin retraction present  · Palpation of Breasts and Axillae: no masses present on palpation, no breast tenderness  · Axillary Lymph Nodes: no lymphadenopathy present    Gastrointestinal  · Abdominal Examination: abdomen non-tender to palpation, normal bowel sounds, no masses present  · Liver and spleen: no hepatomegaly present, spleen not palpable  · Hernias: no hernias identified    Genitourinary  · External Genitalia: normal appearance for age, no discharge present, no tenderness present, no inflammatory lesions present, no masses present, no atrophy present  · Vagina: normal vaginal vault without central or paravaginal defects, no discharge present, no inflammatory lesions present, no masses present  · Bladder: non-tender to palpation  · Urethra: appears normal  · Cervix: normal   · Uterus: normal size, shape and consistency  · Adnexa: no adnexal tenderness present, no adnexal masses present  · Perineum: perineum within normal limits, no evidence of trauma, no rashes or skin lesions present  · Anus: anus within normal limits, no hemorrhoids present  · Inguinal Lymph Nodes: no lymphadenopathy present    Skin  · General Inspection: no rash, no lesions identified    Neurologic/Psychiatric  · Mental Status:  · Orientation: grossly oriented to person, place and time  · Mood and Affect: mood normal, affect appropriate    . Assessment:  Routine gynecologic examination  Her current medical status is satisfactory with no evidence of significant gynecologic issues.     Plan:  Counseled re: diet, exercise, healthy lifestyle  Return for yearly wellness visits  Start OC

## 2020-10-22 ENCOUNTER — OFFICE VISIT (OUTPATIENT)
Dept: INTERNAL MEDICINE CLINIC | Age: 35
End: 2020-10-22

## 2020-10-22 VITALS
HEIGHT: 69 IN | DIASTOLIC BLOOD PRESSURE: 82 MMHG | RESPIRATION RATE: 14 BRPM | OXYGEN SATURATION: 97 % | WEIGHT: 279.6 LBS | TEMPERATURE: 97.7 F | HEART RATE: 70 BPM | BODY MASS INDEX: 41.41 KG/M2 | SYSTOLIC BLOOD PRESSURE: 127 MMHG

## 2020-10-22 DIAGNOSIS — Z23 NEEDS FLU SHOT: ICD-10-CM

## 2020-10-22 DIAGNOSIS — Z83.49 FHX: THYROID DISEASE: ICD-10-CM

## 2020-10-22 DIAGNOSIS — Z80.3 FAMILY HISTORY OF BREAST CANCER: ICD-10-CM

## 2020-10-22 DIAGNOSIS — F41.9 ANXIETY: ICD-10-CM

## 2020-10-22 DIAGNOSIS — R63.5 WEIGHT GAIN: ICD-10-CM

## 2020-10-22 DIAGNOSIS — Z00.00 PREVENTATIVE HEALTH CARE: Primary | ICD-10-CM

## 2020-10-22 DIAGNOSIS — K58.0 IRRITABLE BOWEL SYNDROME WITH DIARRHEA: ICD-10-CM

## 2020-10-22 DIAGNOSIS — Z23 ENCOUNTER FOR IMMUNIZATION: ICD-10-CM

## 2020-10-22 DIAGNOSIS — E66.01 OBESITY, MORBID (HCC): ICD-10-CM

## 2020-10-22 LAB
ALBUMIN SERPL-MCNC: 3.8 G/DL (ref 3.5–5)
ALBUMIN/GLOB SERPL: 1.2 {RATIO} (ref 1.1–2.2)
ALP SERPL-CCNC: 101 U/L (ref 45–117)
ALT SERPL-CCNC: 28 U/L (ref 12–78)
ANION GAP SERPL CALC-SCNC: 9 MMOL/L (ref 5–15)
AST SERPL-CCNC: 16 U/L (ref 15–37)
BILIRUB SERPL-MCNC: 0.5 MG/DL (ref 0.2–1)
BUN SERPL-MCNC: 9 MG/DL (ref 6–20)
BUN/CREAT SERPL: 12 (ref 12–20)
CALCIUM SERPL-MCNC: 9.3 MG/DL (ref 8.5–10.1)
CHLORIDE SERPL-SCNC: 106 MMOL/L (ref 97–108)
CHOLEST SERPL-MCNC: 154 MG/DL
CO2 SERPL-SCNC: 25 MMOL/L (ref 21–32)
CREAT SERPL-MCNC: 0.78 MG/DL (ref 0.55–1.02)
ERYTHROCYTE [DISTWIDTH] IN BLOOD BY AUTOMATED COUNT: 12.7 % (ref 11.5–14.5)
GLOBULIN SER CALC-MCNC: 3.3 G/DL (ref 2–4)
GLUCOSE SERPL-MCNC: 75 MG/DL (ref 65–100)
HCT VFR BLD AUTO: 43.9 % (ref 35–47)
HDLC SERPL-MCNC: 55 MG/DL
HDLC SERPL: 2.8 {RATIO} (ref 0–5)
HGB BLD-MCNC: 14.5 G/DL (ref 11.5–16)
LDLC SERPL CALC-MCNC: 80.6 MG/DL (ref 0–100)
LIPID PROFILE,FLP: NORMAL
MCH RBC QN AUTO: 30 PG (ref 26–34)
MCHC RBC AUTO-ENTMCNC: 33 G/DL (ref 30–36.5)
MCV RBC AUTO: 90.7 FL (ref 80–99)
NRBC # BLD: 0 K/UL (ref 0–0.01)
NRBC BLD-RTO: 0 PER 100 WBC
PLATELET # BLD AUTO: 239 K/UL (ref 150–400)
PMV BLD AUTO: 10.8 FL (ref 8.9–12.9)
POTASSIUM SERPL-SCNC: 4.2 MMOL/L (ref 3.5–5.1)
PROT SERPL-MCNC: 7.1 G/DL (ref 6.4–8.2)
RBC # BLD AUTO: 4.84 M/UL (ref 3.8–5.2)
SODIUM SERPL-SCNC: 140 MMOL/L (ref 136–145)
T4 FREE SERPL-MCNC: 1 NG/DL (ref 0.8–1.5)
TRIGL SERPL-MCNC: 92 MG/DL (ref ?–150)
TSH SERPL DL<=0.05 MIU/L-ACNC: 1.4 UIU/ML (ref 0.36–3.74)
VLDLC SERPL CALC-MCNC: 18.4 MG/DL
WBC # BLD AUTO: 8.1 K/UL (ref 3.6–11)

## 2020-10-22 PROCEDURE — 90732 PPSV23 VACC 2 YRS+ SUBQ/IM: CPT

## 2020-10-22 PROCEDURE — 99213 OFFICE O/P EST LOW 20 MIN: CPT | Performed by: INTERNAL MEDICINE

## 2020-10-22 PROCEDURE — 99395 PREV VISIT EST AGE 18-39: CPT | Performed by: INTERNAL MEDICINE

## 2020-10-22 PROCEDURE — 90686 IIV4 VACC NO PRSV 0.5 ML IM: CPT

## 2020-10-22 RX ORDER — SERTRALINE HYDROCHLORIDE 25 MG/1
25 TABLET, FILM COATED ORAL DAILY
Qty: 30 TAB | Refills: 2 | Status: SHIPPED | OUTPATIENT
Start: 2020-10-22 | End: 2021-05-30

## 2020-10-22 RX ORDER — ALUMINUM ZIRCONIUM OCTACHLOROHYDREX GLY 16 G/100G
1 GEL TOPICAL DAILY
Qty: 30 CAP | Refills: 2
Start: 2020-10-22

## 2020-10-23 LAB — 25(OH)D3 SERPL-MCNC: 28 NG/ML (ref 30–100)

## 2020-10-23 NOTE — PROGRESS NOTES
[place] is a 37 y.o. female  Presenting for her annual checkup and follow-up    Report IBS diarrhea for years. 3-4 loose BMs per day. Does not feel foods affect this. Denies blood in stool or significant abdominal pain, nausea, vomiting. Anxiety  She reports anxiety, stress re: COVID-19, work as an , caring for her 14 mo son, and her mother with metastatic cancer. Ongoing symptoms include: insomnia, racing thoughts, difficulty concentrating. Patient denies: sweating, chest pain, dizziness, paresthesias, suicidal ideation. Denies substance or alcohol abuse. Asks to see a counselor. Weight gain. She admits to poor diet due to stress and limited time to eat during trials. Trying exercise more. Wt Readings from Last 3 Encounters:   10/22/20 279 lb 9.6 oz (126.8 kg)   02/13/20 269 lb (122 kg)   01/31/20 268 lb 4 oz (121.7 kg)       Seeing GYN  Last DEXA:   Health Maintenance   Topic Date Due    Pneumococcal 0-64 years (1 of 1 - PPSV23) 07/23/1993    Flu Vaccine (1) 09/01/2020    PAP AKA CERVICAL CYTOLOGY  08/24/2023    DTaP/Tdap/Td series (4 - Td) 05/24/2029       Exercise: moderately active  Diet: generally follows a low fat low cholesterol diet    Vaccinations reviewed  Immunization History   Administered Date(s) Administered    Hep B Vaccine 01/01/2005    Influenza Vaccine [employer]) PF (>6 Mo Flulaval, Fluarix, and >3 Yrs Afluria, Fluzone 08524) 09/12/2018, 12/09/2019, 10/22/2020    Pneumococcal Polysaccharide (PPSV-23) 10/22/2020    Rho(D) Immune Globulin - IM 05/24/2019    Td 01/01/2005    Tdap 12/19/2018, 05/24/2019       Allergies: Augmentin [amoxicillin-pot clavulanate]; Bactrim [sulfamethoprim]; Penicillins; and Sulfamethoxazole-trimethoprim  Current Outpatient Medications   Medication Sig    sertraline (ZOLOFT) 25 mg tablet Take 1 Tab by mouth daily.  Align 4 mg cap Take 1 Cap by mouth daily.     norethindrone-e estradiol-iron (LOMEDIA 24 FE) 1 mg-20 mcg (24)/75 mg (4) tab Take 1 Tab by mouth daily.  albuterol (PROVENTIL HFA, VENTOLIN HFA, PROAIR HFA) 90 mcg/actuation inhaler INHALE 2 PUFFS EVERY 6 HOURS AS NEEDED FOR WHEEZE     No current facility-administered medications for this visit. has a past medical history of Dysmenorrhea, Exercise-induced asthma, Exercise-induced asthma, Family history of breast cancer, Gestational hypertension, HPV vaccine counseling, Knee pain, chronic, and Patellofemoral arthralgia of both knees. She also has no past medical history of Abnormal Papanicolaou smear of cervix, Anemia, Breast disorder, Chlamydia, Complication of anesthesia, Diabetes ([facility] [address] Disease of blood and blood forming organ, Epilepsy ([address] Essential hypertension, Genital herpes, Gestational diabetes, Gonorrhea, Heart abnormality, Herpes gestationis, Herpes simplex virus (HSV) infection, Human immunodeficiency virus (HIV) disease ([facility] [address] Infertility, female, Kidney disease, Liver disease, Nicotine vapor product user, Non-nicotine vapor product user, Phlebitis and thrombophlebitis, Pituitary disorder ([address] Polycystic disease, ovaries, Postpartum depression, Psychiatric problem, Rhesus isoimmunization affecting pregnancy, Sickle cell disease ([address] Sickle cell trait syndrome ([address] Syphilis, Systemic lupus erythematosus ([address] Thyroid activity decreased, or Trauma.   Past Surgical History:   Procedure Laterality Date    HX  SECTION  2019    Son       Social History     Socioeconomic History    Marital status:      Spouse name: Evelio Putnam Number of children: 0    Years of education: Not on file    Highest education level: Not on file   Occupational History    Occupation:     Occupation:      Employer: [employer] resource strain: Not on file    Food insecurity     Worry: Not on file     Inability: Not on file   [place] [place] needs     Medical: Not on file     Non-medical: Not on file Tobacco Use    Smoking status: Never Smoker    Smokeless tobacco: Never Used   Substance and Sexual Activity    Alcohol use: Not Currently     Comment: 2-3 drinks per week    Drug use: No    Sexual activity: Yes     Partners: Male     Birth control/protection: None   Lifestyle    Physical activity     Days per week: Not on file     Minutes per session: Not on file    Stress: Not on file   Relationships    Social connections     Talks on phone: Not on file     Gets together: Not on file     Attends Latter-day service: Not on file     Active member of club or organization: Not on file     Attends meetings of clubs or organizations: Not on file     Relationship status: Not on file    Intimate partner violence     Fear of current or ex partner: Not on file     Emotionally abused: Not on file     Physically abused: Not on file     Forced sexual activity: Not on file   Other Topics Concern     Service Not Asked    Blood Transfusions Not Asked    Caffeine Concern Not Asked    Occupational Exposure Not Asked    Hobby Hazards Not Asked    Sleep Concern Not Asked    Stress Concern Not Asked    Weight Concern Not Asked    Special Diet Not Asked    Back Care Not Asked    Exercise Not Asked    Bike Helmet Not Asked    Seat Belt Not Asked    Self-Exams Not Asked   Social History Narrative    Not on file     Family History   Problem Relation Age of Onset    Hypertension Mother     Breast Cancer Mother 28    High Cholesterol Mother     Thyroid Disease Mother         hypo    Stroke Paternal Grandfather        Review of Systems - History obtained from the patient  General ROS: negative for - night sweats, weight gain or weight loss  Cardiovascular ROS: no chest pain, dyspnea on exertion, edema  GYN ROS: normal menses, no abnormal bleeding, pelvic pain or discharge, no breast pain or new or enlarging lumps on self exam.    Physical exam  Blood pressure 127/82, pulse 70, temperature 97.7 °F (36.5 °C), temperature source Oral, resp. rate 14, height 5' 8.5\" (1.74 m), weight 279 lb 9.6 oz (126.8 kg), last menstrual period 09/30/2020, SpO2 97 %, not currently breastfeeding. Wt Readings from Last 3 Encounters:   10/22/20 279 lb 9.6 oz (126.8 kg)   02/13/20 269 lb (122 kg)   01/31/20 268 lb 4 oz (121.7 kg)     she appears well, alert and oriented x 3, pleasant and cooperative. Vitals as noted. No rashes or significant lesions. Neck supple and free of adenopathy, or masses. No thyromegaly or carotid bruits. Cranial nerves normal. Lungs are clear to auscultation. Heart sounds are normal with no murmurs, clicks, gallops or rubs. Abdomen is soft, non- tender, with no masses or organomegaly. Extremities, peripheral pulses and reflexes are normal.  .     Diagnoses and all orders for this visit:    1. Preventative health care  -     VITAMIN D, 25 HYDROXY; Future  -     METABOLIC PANEL, COMPREHENSIVE; Future  -     CBC W/O DIFF; Future  -     LIPID PANEL; Future    2. Family history of breast cancer - close f/u    3. FHx: thyroid disease  -     T4, FREE; Future  -     TSH 3RD GENERATION; Future    4. Encounter for immunization  -     PNEUMOCOCCAL POLYSACCHARIDE VACCINE, 23-VALENT, ADULT OR IMMUNOSUPPRESSED PT DOSE,  -     LA IMMUNIZ ADMIN,1 SINGLE/COMB VAC/TOXOID    5. Needs flu shot  -     INFLUENZA VIRUS VAC QUAD,SPLIT,PRESV FREE SYRINGE IM    6. Irritable bowel syndrome with diarrhea  - trial of lactose-free diet, then try align, then SSRI  -     Align 4 mg cap; Take 1 Cap by mouth daily.  -     CELIAC ANTIBODY PROFILE; Future    7. Weight gain - The patient is asked to make an attempt to improve diet and exercise patterns to aid in medical management of this problem. -     T4, FREE; Future  -     TSH 3RD GENERATION; Future    8. Anxiety - uncontrolled. With some dysthymia. Discussed deep breathing exercises, yoga, exercise. Avoid caffeine and cold medicines.    Reviewed concept of anxiety as biochemical imbalance of neurotransmitters and rationale for treatment. Instructed patient to contact office or on-call physician promptly should condition worsen or any new symptoms appear and provided on-call telephone numbers. IF THE PATIENT HAS ANY SUICIDAL OR HOMICIDAL IDEATION, CALL THE OFFICE, DISCUSS WITH A SUPPORT MEMBER OR GO TO THE ER IMMEDIATELY. Patient was agreeable with this plan. Potential medication side effects were discussed with the patient; let me know if any occur.  -     Start sertraline (ZOLOFT) 25 mg tablet; Take 1 Tab by mouth daily.  -     REFERRAL TO PSYCHOLOGY for counseling      9. Obesity, morbid ([facility] [address]  The patient is asked to make an attempt to improve diet and exercise patterns to aid in medical management of this problem.       The patient is asked to make an attempt to improve diet and exercise patterns    Return for yearly wellness visits

## 2020-10-26 LAB
GLIADIN PEPTIDE IGA SER-ACNC: 5 UNITS (ref 0–19)
GLIADIN PEPTIDE IGG SER-ACNC: 2 UNITS (ref 0–19)
IGA SERPL-MCNC: 61 MG/DL (ref 87–352)
TTG IGA SER-ACNC: <2 U/ML (ref 0–3)
TTG IGG SER-ACNC: 6 U/ML (ref 0–5)

## 2021-05-05 ENCOUNTER — E-VISIT (OUTPATIENT)
Dept: INTERNAL MEDICINE CLINIC | Age: 36
End: 2021-05-05
Payer: COMMERCIAL

## 2021-05-05 DIAGNOSIS — J01.11 ACUTE RECURRENT FRONTAL SINUSITIS: ICD-10-CM

## 2021-05-05 PROCEDURE — 99421 OL DIG E/M SVC 5-10 MIN: CPT | Performed by: INTERNAL MEDICINE

## 2021-05-05 RX ORDER — DOXYCYCLINE 100 MG/1
100 CAPSULE ORAL 2 TIMES DAILY
Qty: 14 CAP | Refills: 0 | Status: SHIPPED | OUTPATIENT
Start: 2021-05-05 | End: 2021-05-12

## 2021-05-07 NOTE — TELEPHONE ENCOUNTER
4-day history of sinus congestion, dry cough, nasal congestion. You do not report any fever, chills, shortness of breath, loss of smell or taste, nausea, vomiting, diarrhea. Symptoms are similar to previous sinus infections. Stated symptoms are getting a bit better, but have required antibiotic therapy in the past.Last given doxycycline for similar symptoms. Did not mention any COVID-19 exposures. I sent in doxycycline to [facility].  I hope it helps. If you have any progressive symptoms, we should consider COVID-19 testing and additional evaluation. 7 minutes spent on visit

## 2021-05-26 RX ORDER — NORETHINDRONE ACETATE AND ETHINYL ESTRADIOL 1MG-20(24)
KIT ORAL
Qty: 1 PACKAGE | Refills: 0 | Status: SHIPPED | OUTPATIENT
Start: 2021-05-26 | End: 2021-05-28 | Stop reason: SDUPTHER

## 2021-05-26 NOTE — TELEPHONE ENCOUNTER
Pharmacy request for refill of Blisovi 24 fe    Last AE 2/13/20    Next AE 5/28/21    Will refill per protocol to last until AE

## 2021-05-28 ENCOUNTER — OFFICE VISIT (OUTPATIENT)
Dept: OBGYN CLINIC | Age: 36
End: 2021-05-28
Payer: COMMERCIAL

## 2021-05-28 VITALS
BODY MASS INDEX: 42.28 KG/M2 | HEIGHT: 68 IN | WEIGHT: 279 LBS | DIASTOLIC BLOOD PRESSURE: 80 MMHG | SYSTOLIC BLOOD PRESSURE: 116 MMHG

## 2021-05-28 DIAGNOSIS — Z01.419 ENCOUNTER FOR GYNECOLOGICAL EXAMINATION (GENERAL) (ROUTINE) WITHOUT ABNORMAL FINDINGS: Primary | ICD-10-CM

## 2021-05-28 DIAGNOSIS — N92.6 IRREGULAR MENSES: ICD-10-CM

## 2021-05-28 PROCEDURE — 99395 PREV VISIT EST AGE 18-39: CPT | Performed by: OBSTETRICS & GYNECOLOGY

## 2021-05-28 RX ORDER — NORETHINDRONE ACETATE AND ETHINYL ESTRADIOL 1MG-20(24)
KIT ORAL
Qty: 3 PACKAGE | Refills: 4 | Status: SHIPPED | OUTPATIENT
Start: 2021-05-28

## 2021-05-28 NOTE — PROGRESS NOTES
Kevon Lawler is a ,  37 y.o. female WHITE whose Patient's last menstrual period was 2021 (approximate). was on 2021 who presents for her annual checkup. She is having no significant problems. With regard to the Gardisil vaccine, she is older than the FDA approved age to receive it. Menstrual status:    Her periods are minimal to nonexistent in flow. She is using essentially none pads or tampons per day, very light to nonexistent on contraceptive hormones. She denies dysmenorrhea. She reports no premenstrual symptoms. Contraception:    The current method of family planning is OCP (estrogen/progesterone) and She declines contraception and counseling. Sexual history:    She  reports being sexually active and has had partner(s) who are Male. She reports using the following method of birth control/protection: None. Medical conditions:    Since her last annual GYN exam about 20 ago, she has not the following changes in her health history: none. Pap and Mammogram History:    Her most recent Pap smear was normal/-HPV obtained 2018 year(s) ago. The patient has never had a mammogram.    The patient does have a family history of breast cancer. Past Medical History:   Diagnosis Date    Dysmenorrhea     Dr. Ruma Dukes Exercise-induced asthma     Exercise-induced asthma     Family history of breast cancer     mother age 28. seeing Dr. Maria Luisa Danielle    Gestational hypertension     past three weeks    HPV vaccine counseling     gardasil series complete    Knee pain, chronic     softball injuries.  cartillage damage    Patellofemoral arthralgia of both knees     hx of rowing, catching     Past Surgical History:   Procedure Laterality Date    HX  SECTION  2019    Son        Current Outpatient Medications   Medication Sig Dispense Refill    Blisovi 24 Fe 1 mg-20 mcg (24)/75 mg (4) tab TAKE 1 TABLET BY MOUTH EVERY DAY 1 Package 0    sertraline (ZOLOFT) 25 mg tablet Take 1 Tab by mouth daily. 30 Tab 2    Align 4 mg cap Take 1 Cap by mouth daily. (Patient not taking: Reported on 5/28/2021) 30 Cap 2    albuterol (PROVENTIL HFA, VENTOLIN HFA, PROAIR HFA) 90 mcg/actuation inhaler INHALE 2 PUFFS EVERY 6 HOURS AS NEEDED FOR WHEEZE (Patient not taking: Reported on 5/28/2021) 6.7 Inhaler 0     Allergies: Augmentin [amoxicillin-pot clavulanate], Bactrim [sulfamethoprim], Penicillins, and Sulfamethoxazole-trimethoprim   Social History     Socioeconomic History    Marital status:      Spouse name: Lisa Acevedo Number of children: 0    Years of education: Not on file    Highest education level: Not on file   Occupational History    Occupation: [de-identified]    Occupation:      Employer: SELF EMPLOYED   Tobacco Use    Smoking status: Never Smoker    Smokeless tobacco: Never Used   Substance and Sexual Activity    Alcohol use: Not Currently     Comment: 2-3 drinks per week    Drug use: No    Sexual activity: Yes     Partners: Male     Birth control/protection: None   Other Topics Concern     Service Not Asked    Blood Transfusions Not Asked    Caffeine Concern Not Asked    Occupational Exposure Not Asked    Hobby Hazards Not Asked    Sleep Concern Not Asked    Stress Concern Not Asked    Weight Concern Not Asked    Special Diet Not Asked    Back Care Not Asked    Exercise Not Asked    Bike Helmet Not Asked   [address] Not Asked    Self-Exams Not Asked   Social History Narrative    Not on file     Social Determinants of Health     Financial Resource Strain:     Difficulty of Paying Living Expenses:    Food Insecurity:     Worried About Running Out of Food in the Last Year:     [address] in the Last Year:    Transportation Needs:     Lack of Transportation (Medical):      Lack of Transportation (Non-Medical):    Physical Activity:     Days of Exercise per Week:     Minutes of Exercise per Session:    Stress:     Feeling of Stress :    Social Connections:     Frequency of Communication with Friends and Family:     Frequency of Social Gatherings with Friends and Family:     Attends Zoroastrian Services:     Active Member of Clubs or Organizations:     Attends Club or Organization Meetings:     Marital Status:    Intimate Partner Violence:     Fear of Current or Ex-Partner:     Emotionally Abused:     Physically Abused:     Sexually Abused: Tobacco History:  reports that she has never smoked. She has never used smokeless tobacco.  Alcohol Abuse:  reports previous alcohol use. Drug Abuse:  reports no history of drug use.     Patient Active Problem List   Diagnosis Code    Family history of breast cancer Z80.3    Dysmenorrhea N94.6    At high risk for breast cancer Z91.89    Obesity, morbid ([facility] [address] E66.01    Postpartum care following  delivery Z39.2    Exercise-induced asthma J45.990       Review of Systems - History obtained from the patient  Constitutional: negative for weight loss, fever, night sweats  HEENT: negative for hearing loss, earache, congestion, snoring, sorethroat  CV: negative for chest pain, palpitations, edema  Resp: negative for cough, shortness of breath, wheezing  GI: negative for change in bowel habits, abdominal pain, black or bloody stools  : negative for frequency, dysuria, hematuria, vaginal discharge  MSK: negative for back pain, joint pain, muscle pain  Breast: negative for breast lumps, nipple discharge, galactorrhea  Skin :negative for itching, rash, hives  Neuro: negative for dizziness, headache, confusion, weakness  Psych: negative for anxiety, depression, change in mood  Heme/lymph: negative for bleeding, bruising, pallor    Physical Exam    Visit Vitals  /80   Ht 5' 8\" (1.727 m)   Wt 279 lb (126.6 kg)   LMP 2021 (Approximate)   BMI 42.42 kg/m²       Constitutional  · Appearance: well-nourished, well developed, alert, in no acute distress    HENT  · Head and Face: appears normal    Neck  · Inspection/Palpation: normal appearance, no masses or tenderness  · Lymph Nodes: no lymphadenopathy present  · Thyroid: gland size normal, nontender, no nodules or masses present on palpation    Chest  · Respiratory Effort: breathing normal  · Auscultation: normal breath sounds    Cardiovascular  · Heart:  · Auscultation: regular rate and rhythm without murmur    Breasts  · Inspection of Breasts: breasts symmetrical, no skin changes, no discharge present, nipple appearance normal, no skin retraction present  · Palpation of Breasts and Axillae: no masses present on palpation, no breast tenderness  · Axillary Lymph Nodes: no lymphadenopathy present    Gastrointestinal  · Abdominal Examination: abdomen non-tender to palpation, normal bowel sounds, no masses present  · Liver and spleen: no hepatomegaly present, spleen not palpable  · Hernias: no hernias identified    Genitourinary  · External Genitalia: normal appearance for age, no discharge present, no tenderness present, no inflammatory lesions present, no masses present, no atrophy present  · Vagina: normal vaginal vault without central or paravaginal defects, no discharge present, no inflammatory lesions present, no masses present  · Bladder: non-tender to palpation  · Urethra: appears normal  · Cervix: normal   · Uterus: normal size, shape and consistency  · Adnexa: no adnexal tenderness present, no adnexal masses present  · Perineum: perineum within normal limits, no evidence of trauma, no rashes or skin lesions present  · Anus: anus within normal limits, no hemorrhoids present  · Inguinal Lymph Nodes: no lymphadenopathy present    Skin  · General Inspection: no rash, no lesions identified    Neurologic/Psychiatric  · Mental Status:  · Orientation: grossly oriented to person, place and time  · Mood and Affect: mood normal, affect appropriate    .   Assessment:  Routine gynecologic examination  Her current medical status is satisfactory with no evidence of significant gynecologic issues.   Strong fam hx of breast cancer  Hx of irregular periods  Plan:  Counseled re: diet, exercise, healthy lifestyle  Return for yearly wellness visits  Considering conception - needed meds to ovulate last time - start vits, stop pills when ready and see if gets period - let me know if needs meds  Cont OCP  Needs fu with Rae

## 2021-07-12 ENCOUNTER — OFFICE VISIT (OUTPATIENT)
Dept: SURGERY | Age: 36
End: 2021-07-12
Payer: COMMERCIAL

## 2021-07-12 VITALS
SYSTOLIC BLOOD PRESSURE: 119 MMHG | HEART RATE: 66 BPM | BODY MASS INDEX: 40.73 KG/M2 | DIASTOLIC BLOOD PRESSURE: 54 MMHG | WEIGHT: 275 LBS | HEIGHT: 69 IN

## 2021-07-12 DIAGNOSIS — Z80.3 FAMILY HISTORY OF BREAST CANCER: ICD-10-CM

## 2021-07-12 DIAGNOSIS — Z91.89 AT HIGH RISK FOR BREAST CANCER: Primary | ICD-10-CM

## 2021-07-12 PROCEDURE — 99213 OFFICE O/P EST LOW 20 MIN: CPT | Performed by: SURGERY

## 2021-07-12 NOTE — PATIENT INSTRUCTIONS

## 2021-07-12 NOTE — PROGRESS NOTES
HISTORY OF PRESENT ILLNESS  [place] is a 37 y.o. female. HPI  ESTABLISHED patient here for follow up high risk screening. No breast problems to report at this time. She had a baby in 2019 and discontinued breastfeeding 2019. This is her first follow up here since 2019. Has not had any breast imaging done since she has been post partum.       Tyrer-Anaik risk 24.3 %     Family history-  Mother diagnosed with breast cancer age 28 ([place]), maternal cousin, maternal aunt also with breast cancer all post menopausal. Her mother does not carry the BRCA gene. Past Medical History:   Diagnosis Date    Dysmenorrhea     Dr. Anna Graham Exercise-induced asthma     Exercise-induced asthma     Family history of breast cancer     mother age 28. seeing Dr. Neetu Sanchez    Gestational hypertension     past three weeks    HPV vaccine counseling     gardasil series complete    Knee pain, chronic     softball injuries.  cartillage damage    Morbid obesity (HCC)     Patellofemoral arthralgia of both knees     hx of rowing, catching       Past Surgical History:   Procedure Laterality Date    HX  SECTION  2019    Son        Social History     Socioeconomic History    Marital status:      Spouse name: Dheeraj Baeuchamp Number of children: 0    Years of education: Not on file    Highest education level: Not on file   Occupational History    Occupation: [de-identified]    Occupation:      Employer: SELF EMPLOYED   Tobacco Use    Smoking status: Never Smoker    Smokeless tobacco: Never Used   Substance and Sexual Activity    Alcohol use: Not Currently     Comment: 2-3 drinks per week    Drug use: No    Sexual activity: Yes     Partners: Male     Birth control/protection: None   Other Topics Concern     Service Not Asked    Blood Transfusions Not Asked    Caffeine Concern Not Asked    Occupational Exposure Not Asked    Hobby Hazards Not Asked    Sleep Concern Not Asked    Stress Concern Not Asked    Weight Concern Not Asked    Special Diet Not Asked    Back Care Not Asked    Exercise Not Asked    Bike Helmet Not Asked   [address] Not Asked    Self-Exams Not Asked   Social History Narrative    Not on file     Social Determinants of Health     Financial Resource Strain:     Difficulty of Paying Living Expenses:    Food Insecurity:     Worried About Running Out of Food in the Last Year:     Ran Out of Food in the Last Year:    Transportation Needs:     Lack of Transportation (Medical):  Lack of Transportation (Non-Medical):    Physical Activity:     Days of Exercise per Week:     Minutes of Exercise per Session:    Stress:     Feeling of Stress :    Social Connections:     Frequency of Communication with Friends and Family:     Frequency of Social Gatherings with Friends and Family:     Attends Roman Catholic Services:     Active Member of Clubs or Organizations:     Attends Club or Organization Meetings:     Marital Status:    Intimate Partner Violence:     Fear of Current or Ex-Partner:     Emotionally Abused:     Physically Abused:     Sexually Abused:        Current Outpatient Medications on File Prior to Visit   Medication Sig Dispense Refill    sertraline (ZOLOFT) 25 mg tablet TAKE 1 TABLET BY MOUTH EVERY DAY 30 Tablet 5    norethindrone-e estradiol-iron (Blisovi 24 Fe) 1 mg-20 mcg (24)/75 mg (4) tab TAKE 1 TABLET BY MOUTH EVERY DAY 3 Package 4    Align 4 mg cap Take 1 Cap by mouth daily. (Patient not taking: Reported on 5/28/2021) 30 Cap 2    albuterol (PROVENTIL HFA, VENTOLIN HFA, PROAIR HFA) 90 mcg/actuation inhaler INHALE 2 PUFFS EVERY 6 HOURS AS NEEDED FOR WHEEZE (Patient not taking: Reported on 5/28/2021) 6.7 Inhaler 0     No current facility-administered medications on file prior to visit.        Allergies   Allergen Reactions    Augmentin [Amoxicillin-Pot Clavulanate] Hives     8/13/19: Per RN pt has tolerated Keflex in the past    Bactrim [Sulfamethoprim] Hives    Penicillins Unable to Obtain     19: Per RN pt has tolerated Keflex in the past    Sulfamethoxazole-Trimethoprim Hives       OB History        1    Para   1    Term   1       0    AB   0    Living   1       SAB   0    TAB   0    Ectopic   0    Molar        Multiple   0    Live Births   1          Obstetric Comments   Menarche:  17. LMP: 2014. # of Children:  n/a. Age at Delivery of First Child:  n/a. Hysterectomy/oophorectomy:  NO/NO. Breast Bx no. Hx of Breast Feeding:  n/a. BCP:  yes. Hormone therapy:  no.             ROS    Physical Exam  Exam conducted with a chaperone present. Cardiovascular:      Rate and Rhythm: Normal rate and regular rhythm. Heart sounds: Normal heart sounds. Pulmonary:      Breath sounds: Normal breath sounds. Chest:      Breasts: Breasts are symmetrical.         Right: Normal. No swelling, bleeding, inverted nipple, mass, nipple discharge, skin change or tenderness. Left: Normal. No swelling, bleeding, inverted nipple, mass, nipple discharge, skin change or tenderness. Lymphadenopathy:      Cervical:      Right cervical: No superficial, deep or posterior cervical adenopathy. Left cervical: No superficial, deep or posterior cervical adenopathy. Upper Body:      Right upper body: No supraclavicular or axillary adenopathy. Left upper body: No supraclavicular or axillary adenopathy. ASSESSMENT and PLAN    ICD-10-CM ICD-9-CM    1. At high risk for breast cancer  Z91.89 V49.89 MRI BREAST BI W WO CONT   2. Family history of breast cancer  Z80.3 V16.3 MRI BREAST BI W WO CONT      Established pt presents for annual high risk screening, and is doing well overall. Physical exam today normal. Will order another breast MRI for high risk, and follow up with the results and for further planning. This plan was reviewed with the patient and patient agrees. All questions were answered.     Total time spent was 20 minutes.     Written by Nicole Subramanian, as dictated by Dr. Josette Fermin MD

## 2021-07-12 NOTE — PROGRESS NOTES
HISTORY OF PRESENT ILLNESS  [place] is a 37 y.o. female. HPI ESTABLISHED patient here for follow up high risk screening. No breast problems to report at this time. She had a baby in 2019 and discontinued breastfeeding 12/2019. This is her first follow up here since 2019. Has not had any breast imaging done since she has been post partum. Barbaraer-Anaik risk 24.3 %     Family history-  Mother diagnosed with breast cancer age 28 (Jenny Rey), maternal cousin, maternal aunt also with breast cancer all post menopausal. Her mother does not carry the BRCA gene.     [facility]    Physical Exam    ASSESSMENT and PLAN  {ASSESSMENT/PLAN:07257}

## 2021-09-23 ENCOUNTER — TELEPHONE (OUTPATIENT)
Dept: SURGERY | Age: 36
End: 2021-09-23

## 2021-09-23 ENCOUNTER — HOSPITAL ENCOUNTER (OUTPATIENT)
Dept: MRI IMAGING | Age: 36
Discharge: HOME | End: 2021-09-23
Attending: SURGERY
Payer: COMMERCIAL

## 2021-09-23 DIAGNOSIS — Z91.89 AT HIGH RISK FOR BREAST CANCER: ICD-10-CM

## 2021-09-23 DIAGNOSIS — Z80.3 FAMILY HISTORY OF BREAST CANCER: ICD-10-CM

## 2021-09-23 PROCEDURE — A9585 GADOBUTROL INJECTION: HCPCS | Performed by: SURGERY

## 2021-09-23 PROCEDURE — 77049 MRI BREAST C-+ W/CAD BI: CPT

## 2021-09-23 PROCEDURE — 74011250636 HC RX REV CODE- 250/636: Performed by: SURGERY

## 2021-09-23 RX ADMIN — GADOBUTROL 12 ML: 604.72 INJECTION INTRAVENOUS at 09:35

## 2022-03-19 PROBLEM — E66.01 OBESITY, MORBID (HCC): Status: ACTIVE | Noted: 2018-08-24

## 2022-03-19 PROBLEM — Z91.89 AT HIGH RISK FOR BREAST CANCER: Status: ACTIVE | Noted: 2017-05-08

## 2022-07-01 DIAGNOSIS — F41.9 ANXIETY: ICD-10-CM

## 2022-07-01 RX ORDER — SERTRALINE HYDROCHLORIDE 25 MG/1
TABLET, FILM COATED ORAL
Qty: 90 TABLET | Refills: 1 | Status: SHIPPED | OUTPATIENT
Start: 2022-07-01

## 2022-07-01 NOTE — TELEPHONE ENCOUNTER
T/C to patient to advise she is due for an appt with Dr. Velinda Leyden and to schedule. No answer and LVM.

## 2022-12-15 NOTE — PROGRESS NOTES
[place] is a 30 y.o. female returns for an annual exam     Chief Complaint   Patient presents with    Well Woman       Patient's last menstrual period was 12/01/2022 (exact date). Her periods are about every 6 weeks when she is not on OCP. Regular flow for her last about 5 days  She does not have dysmenorrhea. Problems: no significant problems  Birth Control: none currently  Last Pap: 08/24/2018 normal/HPV neg  She does not have a history of LINDA 2, 3 or cervical cancer. She has Breast MRIs every year  With regard to the Gardisil vaccine, she has received all 3 injections. 1. Have you been to the ER, urgent care clinic, or hospitalized since your last visit? No    2. Have you seen or consulted any other health care providers outside of the [address] [place] [address] since your last visit?  No    Examination chaperoned by Liu Gonzalez RN

## 2022-12-19 ENCOUNTER — OFFICE VISIT (OUTPATIENT)
Dept: SURGERY | Age: 37
End: 2022-12-19
Payer: COMMERCIAL

## 2022-12-19 DIAGNOSIS — Z91.89 AT HIGH RISK FOR BREAST CANCER: ICD-10-CM

## 2022-12-19 DIAGNOSIS — Z80.3 FAMILY HISTORY OF BREAST CANCER: ICD-10-CM

## 2022-12-19 DIAGNOSIS — Z12.31 BREAST CANCER SCREENING BY MAMMOGRAM: Primary | ICD-10-CM

## 2022-12-19 NOTE — PROGRESS NOTES
HISTORY OF PRESENT ILLNESS  [place] is a 30 y.o. female. HPI  ESTABLISHED patient here for follow up high risk screening. No breast problems to report at this time. She would like to have her annual MRI for screening. Denies any breast changes. Barbaraer-Anaik risk 24.3 %     Family history-  Mother diagnosed with breast cancer age 28 (Panchito Khans), maternal cousin, maternal aunt also with breast cancer all post menopausal. Her mother does not carry the BRCA gene. Passed away in 2022 from metastatic breast cancer. Review of Systems   All other systems reviewed and are negative. Past Medical History:   Diagnosis Date    Dysmenorrhea     Dr. Marce Hill    Exercise-induced asthma     Exercise-induced asthma     Family history of breast cancer     mother age 28. seeing Dr. Amie Goetz    Gestational hypertension     past three weeks    HPV vaccine counseling     gardasil series complete    Knee pain, chronic     softball injuries.  cartillage damage    Morbid obesity (HCC)     Patellofemoral arthralgia of both knees     hx of rowing, catching       Past Surgical History:   Procedure Laterality Date    HX  SECTION  2019    Son        Social History     Socioeconomic History    Marital status:      Spouse name: Scott Vee    Number of children: 0    Years of education: Not on file    Highest education level: Not on file   Occupational History    Occupation:     Occupation:      Employer: SELF EMPLOYED   Tobacco Use    Smoking status: Never    Smokeless tobacco: Never   Substance and Sexual Activity    Alcohol use: Not Currently     Comment: 2-3 drinks per week    Drug use: No    Sexual activity: Yes     Partners: Male     Birth control/protection: None   Other Topics Concern     Service Not Asked    Blood Transfusions Not Asked    Caffeine Concern Not Asked    Occupational Exposure Not Asked    Hobby Hazards Not Asked    Sleep Concern Not Asked Stress Concern Not Asked    Weight Concern Not Asked    Special Diet Not Asked    Back Care Not Asked    Exercise Not Asked    Bike Helmet Not Asked    Seat Belt Not Asked    Self-Exams Not Asked   Social History Narrative    Not on file     Social Determinants of Health     Financial Resource Strain: Not on file   Food Insecurity: Not on file   Transportation Needs: Not on file   Physical Activity: Not on file   Stress: Not on file   Social Connections: Not on file   Intimate Partner Violence: Not on file   Housing Stability: Not on file       Current Outpatient Medications on File Prior to Visit   Medication Sig Dispense Refill    sertraline (ZOLOFT) 25 mg tablet TAKE 1 TABLET BY MOUTH EVERY DAY 90 Tablet 1    norethindrone-e estradiol-iron (Blisovi 24 Fe) 1 mg-20 mcg (24)/75 mg (4) tab TAKE 1 TABLET BY MOUTH EVERY DAY 3 Package 4    Align 4 mg cap Take 1 Cap by mouth daily. (Patient not taking: Reported on 2021) 30 Cap 2    albuterol (PROVENTIL HFA, VENTOLIN HFA, PROAIR HFA) 90 mcg/actuation inhaler INHALE 2 PUFFS EVERY 6 HOURS AS NEEDED FOR WHEEZE (Patient not taking: Reported on 2021) 6.7 Inhaler 0     No current facility-administered medications on file prior to visit. Allergies   Allergen Reactions    Augmentin [Amoxicillin-Pot Clavulanate] Hives     19: Per RN pt has tolerated Keflex in the past    Bactrim [Sulfamethoprim] Hives    Penicillins Unable to Obtain     19: Per RN pt has tolerated Keflex in the past    Sulfamethoxazole-Trimethoprim Hives       OB History          1    Para   1    Term   1       0    AB   0    Living   1         SAB   0    IAB   0    Ectopic   0    Molar        Multiple   0    Live Births   1          Obstetric Comments   Menarche:  17. LMP: 2014. # of Children:  n/a. Age at Delivery of First Child:  n/a. Hysterectomy/oophorectomy:  NO/NO. Breast Bx no. Hx of Breast Feeding:  n/a. BCP:  yes.  Hormone therapy:  no. ROS      Physical Exam  Exam conducted with a chaperone present. Constitutional:       Appearance: She is well-developed. She is not diaphoretic. HENT:      Head: Normocephalic and atraumatic. Right Ear: External ear normal.      Left Ear: External ear normal.   Eyes:      General: No scleral icterus. Right eye: No discharge. Left eye: No discharge. Pupils: Pupils are equal, round, and reactive to light. Neck:      Thyroid: No thyromegaly. Vascular: No JVD. Trachea: No tracheal deviation. Cardiovascular:      Rate and Rhythm: Normal rate and regular rhythm. Heart sounds: Normal heart sounds. Pulmonary:      Effort: Pulmonary effort is normal. No tachypnea, accessory muscle usage or respiratory distress. Breath sounds: Normal breath sounds. No stridor. Chest:   Breasts:     Breasts are symmetrical.      Right: No inverted nipple, mass, nipple discharge, skin change or tenderness. Left: No inverted nipple, mass, nipple discharge, skin change or tenderness. Abdominal:      General: There is no distension. Palpations: Abdomen is soft. There is no mass. Tenderness: There is no abdominal tenderness. Musculoskeletal:         General: Normal range of motion. Cervical back: Normal range of motion and neck supple. Lymphadenopathy:      Cervical: No cervical adenopathy. Skin:     General: Skin is warm and dry. Neurological:      Mental Status: She is alert and oriented to person, place, and time. Psychiatric:         Speech: Speech normal.         Behavior: Behavior normal.         Thought Content: Thought content normal.         Judgment: Judgment normal.           BREAST ULTRASOUND  Indication: RIGHT nodularity. Technique:  The area was scanned using a high-frequency linear-array near-field transducer  Findings: No abnormal mass, lesion, or shadowing noted; no cysts  Impression: Normal dense breast tissue   Disposition: No worrisome finding on ultrasound         ASSESSMENT and PLAN    ICD-10-CM ICD-9-CM    1. Breast cancer screening by mammogram  Z12.31 V76.12 ANDREA 3D TOM W MAMMO BI SCREENING INCL CAD      2. At high risk for breast cancer  Z91.89 V49.89 ANDREA 3D TOM W MAMMO BI SCREENING INCL CAD      MRI BREAST BI W WO CONT      3. Family history of breast cancer  Z80.3 V16.3 ANDREA 3D TOM W MAMMO BI SCREENING INCL CAD      MRI BREAST BI W WO CONT        Patient presents to follow up on high risk screening, and is doing well overall. Upon physical examination noted palpable nodularity at 6:00 of RIGHT breast. Ultrasound visualizes normal. Informed patient that first step will be screening mammogram and breast MRI. Explained in detail of procedure of BILATERAL mastectomies, with different reconstruction routes, will refer patient to plastic surgeon. Follow up with imaging results. This plan was reviewed with the patient and patient agrees. All questions were answered. Total time spent was 40 minutes.     Written by Elsa Lamas, as dictated by Dr. Louis Villalpando MD.

## 2022-12-19 NOTE — PROGRESS NOTES
HISTORY OF PRESENT ILLNESS  [facility] 30 [facility]. HPI ESTABLISHED patient here for follow up high risk screening. No breast problems to report at this time. She would like to have her annual MRI for screening. Denies any breast changes. Wendie risk 24.3 %     Family history-  Mother diagnosed with breast cancer age 28 (Sebastian Trevino), maternal cousin, maternal aunt also with breast cancer all post menopausal. Her mother does not carry the BRCA gene. Passed away in October 2022 from metastatic breast cancer. Review of Systems   All other systems reviewed and are negative.     Physical Exam    ASSESSMENT and PLAN  {ASSESSMENT/PLAN:73350}

## 2022-12-22 ENCOUNTER — OFFICE VISIT (OUTPATIENT)
Dept: OBGYN CLINIC | Age: 37
End: 2022-12-22
Payer: COMMERCIAL

## 2022-12-22 VITALS — BODY MASS INDEX: 42.5 KG/M2 | DIASTOLIC BLOOD PRESSURE: 84 MMHG | WEIGHT: 287.8 LBS | SYSTOLIC BLOOD PRESSURE: 127 MMHG

## 2022-12-22 DIAGNOSIS — Z01.419 ENCOUNTER FOR GYNECOLOGICAL EXAMINATION (GENERAL) (ROUTINE) WITHOUT ABNORMAL FINDINGS: Primary | ICD-10-CM

## 2022-12-22 PROCEDURE — 99395 PREV VISIT EST AGE 18-39: CPT | Performed by: OBSTETRICS & GYNECOLOGY

## 2022-12-22 NOTE — PROGRESS NOTES
Cheyanne Kat is a V1 ,  30 y.o. female WHITE/NON- whose Patient's last menstrual period was 2022 (exact date). was on 2022 who presents for her annual checkup. She is having no significant problems. Mother  of breast cancer in October. She is considering a bilateral mastectomy but will likely wait until she is done with childbearing. Menstrual status:    Her periods are moderate in flow, irregular about every 6 weeks    She denies dysmenorrhea. Contraception:    The current method of family planning is none    Sexual history:    She  reports being sexually active and has had partner(s) who are male. She reports using the following method of birth control/protection: None. Pap and Mammogram History:        The patient does have a family history of breast cancer. Mother premenopausal breast cancer BRCA negative    Past Medical History:   Diagnosis Date    Dysmenorrhea     Dr. Coby Goodpasture    Exercise-induced asthma     Exercise-induced asthma     Family history of breast cancer     mother age 28. seeing Dr. Evan Delgado    Gestational hypertension     past three weeks    HPV vaccine counseling     gardasil series complete    Knee pain, chronic     softball injuries.  cartillage damage    Morbid obesity (HCC)     Patellofemoral arthralgia of both knees     hx of rowing, catching     Past Surgical History:   Procedure Laterality Date    HX  SECTION  2019    Son        Current Outpatient Medications   Medication Sig Dispense Refill    sertraline (ZOLOFT) 25 mg tablet TAKE 1 TABLET BY MOUTH EVERY DAY 90 Tablet 1     Allergies: Augmentin [amoxicillin-pot clavulanate], Bactrim [sulfamethoprim], Penicillins, and Sulfamethoxazole-trimethoprim   Social History     Socioeconomic History    Marital status:      Spouse name: Adam Carrasco    Number of children: 0    Years of education: Not on file    Highest education level: Not on file   Occupational History    Occupation:     Occupation:      Employer: SELF EMPLOYED   Tobacco Use    Smoking status: Never    Smokeless tobacco: Never   Substance and Sexual Activity    Alcohol use: Not Currently     Comment: 2-3 drinks per week    Drug use: No    Sexual activity: Yes     Partners: Male     Birth control/protection: None   Other Topics Concern     Service Not Asked    Blood Transfusions Not Asked    Caffeine Concern Not Asked    Occupational Exposure Not Asked    Hobby Hazards Not Asked    Sleep Concern Not Asked    Stress Concern Not Asked    Weight Concern Not Asked    Special Diet Not Asked    Back Care Not Asked    Exercise Not Asked    Bike Helmet Not Asked    Seat Belt Not Asked    Self-Exams Not Asked   Social History Narrative    Not on file     Social Determinants of Health     Financial Resource Strain: Not on file   Food Insecurity: Not on file   Transportation Needs: Not on file   Physical Activity: Not on file   Stress: Not on file   Social Connections: Not on file   Intimate Partner Violence: Not on file   Housing Stability: Not on file     Tobacco History:  reports that she has never smoked. She has never used smokeless tobacco.  Alcohol Abuse:  reports that she does not currently use alcohol. Drug Abuse:  reports no history of drug use.     Patient Active Problem List   Diagnosis Code    Family history of breast cancer Z80.3    Dysmenorrhea N94.6    At high risk for breast cancer Z91.89    Obesity, morbid ([facility] [address] E66.01    Postpartum care following  delivery Z39.2    Exercise-induced asthma J45.990       Review of Systems - History obtained from the patient  Constitutional: negative for weight loss, fever, night sweats  HEENT: negative for hearing loss, earache, congestion, snoring, sorethroat  CV: negative for chest pain, palpitations, edema  Resp: negative for cough, shortness of breath, wheezing  GI: negative for change in bowel habits, abdominal pain, black or bloody stools  : negative for frequency, dysuria, hematuria, vaginal discharge  MSK: negative for back pain, joint pain, muscle pain  Breast: negative for breast lumps, nipple discharge, galactorrhea  Skin :negative for itching, rash, hives  Neuro: negative for dizziness, headache, confusion, weakness  Psych: negative for anxiety, depression, change in mood  Heme/lymph: negative for bleeding, bruising, pallor    Physical Exam    Visit Vitals  /84   Wt 287 lb 12.8 oz (130.5 kg)   LMP 12/01/2022 (Exact Date)   BMI 42.50 kg/m²       Constitutional  Appearance: well-nourished, well developed, alert, in no acute distress    HENT  Head and Face: appears normal    Neck  Inspection/Palpation: normal appearance, no masses or tenderness  Lymph Nodes: no lymphadenopathy present  Thyroid: gland size normal, nontender, no nodules or masses present on palpation    Chest  Respiratory Effort: breathing normal  Auscultation: normal breath sounds    Cardiovascular  Heart:   Auscultation: regular rate and rhythm without murmur    Breasts  Inspection of Breasts: breasts symmetrical, no skin changes, no discharge present, nipple appearance normal, no skin retraction present  Palpation of Breasts and Axillae: no masses present on palpation, no breast tenderness  Axillary Lymph Nodes: no lymphadenopathy present    Gastrointestinal  Abdominal Examination: abdomen non-tender to palpation, normal bowel sounds, no masses present  Liver and spleen: no hepatomegaly present, spleen not palpable  Hernias: no hernias identified    Genitourinary  External Genitalia: normal appearance for age, no discharge present, no tenderness present, no inflammatory lesions present, no masses present, no atrophy present  Vagina: normal vaginal vault without central or paravaginal defects, no discharge present, no inflammatory lesions present, no masses present  Bladder: non-tender to palpation  Urethra: appears normal  Cervix: normal   Uterus: normal size, shape and consistency  Adnexa: no adnexal tenderness present, no adnexal masses present  Perineum: perineum within normal limits, no evidence of trauma, no rashes or skin lesions present  Anus: anus within normal limits, no hemorrhoids present  Inguinal Lymph Nodes: no lymphadenopathy present    Skin  General Inspection: no rash, no lesions identified    Neurologic/Psychiatric  Mental Status:  Orientation: grossly oriented to person, place and time  Mood and Affect: mood normal, affect appropriate    . Assessment:  Routine gynecologic examination  Her current medical status is satisfactory with no evidence of significant gynecologic issues. Plan:  Counseled re: diet, exercise, healthy lifestyle  Return for yearly wellness visits  Pt counseled regarding co-testing for high risk HPV with pap  Plans MRI in January. If that is normal she will call for [facility].   Take vitamins

## 2023-02-06 ENCOUNTER — OFFICE VISIT (OUTPATIENT)
Dept: INTERNAL MEDICINE CLINIC | Age: 38
End: 2023-02-06
Payer: COMMERCIAL

## 2023-02-06 ENCOUNTER — TRANSCRIBE ORDER (OUTPATIENT)
Dept: SCHEDULING | Age: 38
End: 2023-02-06

## 2023-02-06 VITALS
HEART RATE: 86 BPM | WEIGHT: 292.4 LBS | RESPIRATION RATE: 16 BRPM | OXYGEN SATURATION: 94 % | TEMPERATURE: 98.1 F | DIASTOLIC BLOOD PRESSURE: 72 MMHG | HEIGHT: 69 IN | SYSTOLIC BLOOD PRESSURE: 113 MMHG | BODY MASS INDEX: 43.31 KG/M2

## 2023-02-06 DIAGNOSIS — E66.01 CLASS 3 SEVERE OBESITY WITHOUT SERIOUS COMORBIDITY WITH BODY MASS INDEX (BMI) OF 40.0 TO 44.9 IN ADULT, UNSPECIFIED OBESITY TYPE (HCC): ICD-10-CM

## 2023-02-06 DIAGNOSIS — G47.30 SLEEP APNEA, UNSPECIFIED TYPE: ICD-10-CM

## 2023-02-06 DIAGNOSIS — F43.21 GRIEF REACTION: ICD-10-CM

## 2023-02-06 DIAGNOSIS — R53.83 FATIGUE, UNSPECIFIED TYPE: ICD-10-CM

## 2023-02-06 DIAGNOSIS — R63.5 WEIGHT GAIN: ICD-10-CM

## 2023-02-06 DIAGNOSIS — Z00.00 PREVENTATIVE HEALTH CARE: Primary | ICD-10-CM

## 2023-02-06 DIAGNOSIS — F41.9 ANXIETY: ICD-10-CM

## 2023-02-06 DIAGNOSIS — Z12.31 SCREENING MAMMOGRAM FOR HIGH-RISK PATIENT: Primary | ICD-10-CM

## 2023-02-06 PROCEDURE — 99395 PREV VISIT EST AGE 18-39: CPT | Performed by: INTERNAL MEDICINE

## 2023-02-06 PROCEDURE — 99213 OFFICE O/P EST LOW 20 MIN: CPT | Performed by: INTERNAL MEDICINE

## 2023-02-06 RX ORDER — SERTRALINE HYDROCHLORIDE 50 MG/1
50 TABLET, FILM COATED ORAL DAILY
Qty: 90 TABLET | Refills: 1 | Status: SHIPPED | OUTPATIENT
Start: 2023-02-06

## 2023-02-06 RX ORDER — PHENTERMINE HYDROCHLORIDE 37.5 MG/1
TABLET ORAL
Qty: 30 TABLET | Refills: 2 | Status: SHIPPED | OUTPATIENT
Start: 2023-02-06

## 2023-02-06 NOTE — PROGRESS NOTES
[place] is a 30 y.o. female  Presenting for her annual checkup and follow-up    [place] is mourning, along with her family, loss of her mother, Danny Ray, to metastatic breast cancer in October 2022. Is has been very difficult time. She is also trying to support her , 2 sisters and father in the mourning process. Also has a 1year-old son who has been having sleep disturbances. She is seeing a counselor every 2 to 3 weeks and finds it helpful. She has been treated for anxiety with sertraline 25 mg daily since October 2020. Feels it has helped some. Discussed need to increase dose. Denies any significant alcohol or other drug use. Morbid obesity. She is interested in medications to help with weight loss. She has difficulty finding time to exercise, largely because of work and caring for her 1year-old son. Her  travels as well. Tries to be diligent about low carbohydrate diet and avoiding excessive calories. Wt Readings from Last 3 Encounters:   02/06/23 292 lb 6.4 oz (132.6 kg)   12/22/22 287 lb 12.8 oz (130.5 kg)   07/12/21 275 lb (124.7 kg)   Body mass index is 43.18 kg/m². Family history of breast cancer. Mother, cousin, maternal aunt. She is followed by breast specialist, Dr. Fang Trinidad  She is currently scheduled for repeat breast MRI and mammogram on February 14, 2023. She states she does not know if she wants to have a mammogram and MRI every year and is considering bilateral mastectomy. She and her  are also considering pregnancy again in the next year or 2. She saw gynecology for follow-up.     Last breast exam: per GYN  Last PAP/pelvic: perGYN  Last colonoscopy: none  Last DEXA:   Health Maintenance   Topic Date Due    Depression Screen  Never done    COVID-19 Vaccine (3 - Booster for Pfizer series) 05/29/2021    Cervical cancer screen  12/22/2027    DTaP/Tdap/Td series (4 - Td or Tdap) 05/24/2029    Flu Vaccine  Completed    Pneumococcal 0-64 years  Completed    Hepatitis C Screening  Discontinued       Exercise: not active  Diet: generally follows a low fat low cholesterol diet    Vaccinations reviewed  Immunization History   Administered Date(s) Administered    COVID-19, PFIZER PURPLE top, DILUTE for use, (age 15 y+), IM, 30mcg/0.3mL 03/13/2021, 04/03/2021    Hep B Vaccine 01/01/2005    Influenza Vaccine 10/13/2022    Influenza, FLUARIX, FLULAVAL, FLUZONE (age 10 mo+) AND AFLURIA, (age 1 y+), PF, 0.5mL 09/12/2018, 12/09/2019, 10/22/2020    Pneumococcal Polysaccharide (PPSV-23) 10/22/2020    Rho(D) Immune Globulin - IM 05/24/2019    Td 01/01/2005    Tdap 12/19/2018, 05/24/2019       Allergies: Augmentin [amoxicillin-pot clavulanate], Bactrim [sulfamethoprim], Penicillins, and Sulfamethoxazole-trimethoprim  Current Outpatient Medications   Medication Sig    prenatal no122/iron/folic acid (PRENATAL MULTI PO) Take  by mouth daily. sertraline (ZOLOFT) 50 mg tablet Take 1 Tablet by mouth daily. Increase 2/6/23    phentermine (ADIPEX-P) 37.5 mg tablet Take 1/2 pill in AM and 1/2 pill in afternoon  Indications: weight loss management for an obese person     No current facility-administered medications for this visit. has a past medical history of COVID-19 (05/2022), Dysmenorrhea, Exercise-induced asthma, Family history of breast cancer, Gestational hypertension, HPV vaccine counseling, Knee pain, chronic, Morbid obesity ([address] and Patellofemoral arthralgia of both knees.     She has no past medical history of Abnormal Papanicolaou smear of cervix, Anemia, Breast disorder, Chlamydia, Complication of anesthesia, Diabetes ([address] Disease of blood and blood forming organ, Epilepsy ([address] Essential hypertension, Genital herpes, Gestational diabetes, Gonorrhea, Heart abnormality, Herpes gestationis, Herpes simplex virus (HSV) infection, Human immunodeficiency virus (HIV) disease ([address] Infertility, female, Kidney disease, Liver disease, Nicotine vapor product user, Non-nicotine vapor product user, Phlebitis and thrombophlebitis, Pituitary disorder ([facility] Polycystic disease, ovaries, Postpartum depression, Psychiatric problem, Rhesus isoimmunization affecting pregnancy, Sickle cell disease ([facility] Sickle cell trait syndrome ([facility] Syphilis, Systemic lupus erythematosus ([facility] [address] Thyroid activity decreased, or Trauma.   Past Surgical History:   Procedure Laterality Date    HX  SECTION  2019    Son       Social History     Socioeconomic History    Marital status:      Spouse name: Lev Hagan    Number of children: 1    Years of education: Not on file    Highest education level: Not on file   Occupational History    Occupation:      Employer: SELF EMPLOYED   Tobacco Use    Smoking status: Never    Smokeless tobacco: Never   Substance and Sexual Activity    Alcohol use: Not Currently     Comment: 2-3 drinks per week    Drug use: No    Sexual activity: Yes     Partners: Male     Birth control/protection: None   Other Topics Concern     Service Not Asked    Blood Transfusions Not Asked    Caffeine Concern Not Asked    Occupational Exposure Not Asked    Hobby Hazards Not Asked    Sleep Concern Not Asked    Stress Concern Not Asked    Weight Concern Not Asked    Special Diet Not Asked    Back Care Not Asked    Exercise Not Asked    Bike Helmet Not Asked    Seat Belt Not Asked    Self-Exams Not Asked   Social History Narrative    Not on file     Social Determinants of Health     Financial Resource Strain: Not on file   Food Insecurity: Not on file   Transportation Needs: Not on file   Physical Activity: Not on file   Stress: Not on file   Social Connections: Not on file   Intimate Partner Violence: Not on file   Housing Stability: Not on file     Family History   Problem Relation Age of Onset    Hypertension Mother     Breast Cancer Mother 28    High Cholesterol Mother     Thyroid Disease Mother         hypo    Stroke Paternal Grandfather Review of Systems - History obtained from the patient  General ROS: negative for - night sweats, weight gain or weight loss  Cardiovascular ROS: no chest pain, dyspnea on exertion, edema  GYN ROS: normal menses, no abnormal bleeding, pelvic pain or discharge, no breast pain or new or enlarging lumps on self exam.    Physical exam  Blood pressure 113/72, pulse 86, temperature 98.1 °F (36.7 °C), temperature source Oral, resp. rate 16, height 5' 9\" (1.753 m), weight 292 lb 6.4 oz (132.6 kg), last menstrual period 02/05/2023, SpO2 94 %, not currently breastfeeding. Wt Readings from Last 3 Encounters:   02/06/23 292 lb 6.4 oz (132.6 kg)   12/22/22 287 lb 12.8 oz (130.5 kg)   07/12/21 275 lb (124.7 kg)     she appears well, alert and oriented x 3, pleasant and cooperative. Vitals as noted. No rashes or significant lesions. Neck supple and free of adenopathy, or masses. Moderately narrow oropharynx. No thyromegaly or carotid bruits. Cranial nerves normal. Lungs are clear to auscultation. Heart sounds are normal with no murmurs, clicks, gallops or rubs. Abdomen is soft, non- tender, with no masses or organomegaly. Extremities, peripheral pulses and reflexes are normal.  . BREAST EXAM: not examined  PELVIC EXAM: examination not indicated      Diagnoses and all orders for this visit:    1. Preventative health care  Check preventative labs. Gynecologic examination performed  Breast MRI and mammogram next week. No clear indication for colon screening at this point.  -     LIPID PANEL; Future  -     CBC W/O DIFF; Future  -     METABOLIC PANEL, COMPREHENSIVE; Future  -     TSH 3RD GENERATION; Future    2. Weight gaim  We will check TSH, but unlikely a thyroid issue. Discussed a low carbohydrate diet. Discussed importance of increasing physical activity is much as possible.     3. Class 3 severe obesity without serious comorbidity with body mass index (BMI) of 40.0 to 44.9 in adult, unspecified obesity type [facility])  The patient is asked to make an attempt to improve diet and exercise patterns to aid in medical management of this problem. Plan trial of phentermine. Should be well-tolerated. Could be less expensive than other options at this point. Check A1c. Discussed option of GLP-1 medication such as Wegovy and certainly will consider that as next option. Could consider adding topiramate, but I am concerned about risk of fatigue. -     phentermine (ADIPEX-P) 37.5 mg tablet; Take 1/2 pill in AM and 1/2 pill in afternoon  Indications: weight loss management for an obese person  -     HEMOGLOBIN A1C WITH EAG; Future    4. Anxiety  5. Grief reaction  Offered my support. Continue counseling. Increase sertraline. Increasing physical activity could be helpful. Discussed the importance of sleep. -     sertraline (ZOLOFT) 50 mg tablet; Take 1 Tablet by mouth daily. Increase 2/6/23    6. Sleep apnea, unspecified type  7. Fatigue, unspecified type  Patient reports fatigue, frequent nocturnal awakenings.  says she snores heavily and has witnessed occasional apnea. Dentist states she has a narrow oropharynx which I have confirmed. Probable sleep apnea. Discussed evaluation and treatment. She would like to have a home sleep study. Ordered. Weight loss may help improve or resolve sleep apnea.   Avoid excessive alcohol.  -     REFERRAL TO SLEEP STUDIES      The patient is asked to make an attempt to improve diet and exercise patterns    Return for yearly wellness visits

## 2023-02-07 ENCOUNTER — DOCUMENTATION ONLY (OUTPATIENT)
Dept: INTERNAL MEDICINE CLINIC | Age: 38
End: 2023-02-07

## 2023-02-07 LAB
ALBUMIN SERPL-MCNC: 3.9 G/DL (ref 3.5–5)
ALBUMIN/GLOB SERPL: 1.1 (ref 1.1–2.2)
ALP SERPL-CCNC: 90 U/L (ref 45–117)
ALT SERPL-CCNC: 29 U/L (ref 12–78)
ANION GAP SERPL CALC-SCNC: 4 MMOL/L (ref 5–15)
AST SERPL-CCNC: 17 U/L (ref 15–37)
BILIRUB SERPL-MCNC: 0.6 MG/DL (ref 0.2–1)
BUN SERPL-MCNC: 12 MG/DL (ref 6–20)
BUN/CREAT SERPL: 16 (ref 12–20)
CALCIUM SERPL-MCNC: 8.9 MG/DL (ref 8.5–10.1)
CHLORIDE SERPL-SCNC: 105 MMOL/L (ref 97–108)
CHOLEST SERPL-MCNC: 167 MG/DL
CO2 SERPL-SCNC: 28 MMOL/L (ref 21–32)
CREAT SERPL-MCNC: 0.77 MG/DL (ref 0.55–1.02)
ERYTHROCYTE [DISTWIDTH] IN BLOOD BY AUTOMATED COUNT: 13 % (ref 11.5–14.5)
EST. AVERAGE GLUCOSE BLD GHB EST-MCNC: 97 MG/DL
GLOBULIN SER CALC-MCNC: 3.6 G/DL (ref 2–4)
GLUCOSE SERPL-MCNC: 101 MG/DL (ref 65–100)
HBA1C MFR BLD: 5 % (ref 4–5.6)
HCT VFR BLD AUTO: 44.1 % (ref 35–47)
HDLC SERPL-MCNC: 57 MG/DL
HDLC SERPL: 2.9 (ref 0–5)
HGB BLD-MCNC: 14.4 G/DL (ref 11.5–16)
LDLC SERPL CALC-MCNC: 79.8 MG/DL (ref 0–100)
MCH RBC QN AUTO: 30 PG (ref 26–34)
MCHC RBC AUTO-ENTMCNC: 32.7 G/DL (ref 30–36.5)
MCV RBC AUTO: 91.9 FL (ref 80–99)
NRBC # BLD: 0 K/UL (ref 0–0.01)
NRBC BLD-RTO: 0 PER 100 WBC
PLATELET # BLD AUTO: 246 K/UL (ref 150–400)
PMV BLD AUTO: 10.8 FL (ref 8.9–12.9)
POTASSIUM SERPL-SCNC: 3.9 MMOL/L (ref 3.5–5.1)
PROT SERPL-MCNC: 7.5 G/DL (ref 6.4–8.2)
RBC # BLD AUTO: 4.8 M/UL (ref 3.8–5.2)
SODIUM SERPL-SCNC: 137 MMOL/L (ref 136–145)
TRIGL SERPL-MCNC: 151 MG/DL (ref ?–150)
TSH SERPL DL<=0.05 MIU/L-ACNC: 1.91 UIU/ML (ref 0.36–3.74)
VLDLC SERPL CALC-MCNC: 30.2 MG/DL
WBC # BLD AUTO: 7.8 K/UL (ref 3.6–11)

## 2023-02-14 ENCOUNTER — HOSPITAL ENCOUNTER (OUTPATIENT)
Dept: MRI IMAGING | Age: 38
Discharge: HOME | End: 2023-02-14
Attending: SURGERY
Payer: COMMERCIAL

## 2023-02-14 ENCOUNTER — HOSPITAL ENCOUNTER (OUTPATIENT)
Dept: MAMMOGRAPHY | Age: 38
Discharge: HOME | End: 2023-02-14
Attending: SURGERY
Payer: COMMERCIAL

## 2023-02-14 DIAGNOSIS — Z91.89 AT HIGH RISK FOR BREAST CANCER: ICD-10-CM

## 2023-02-14 DIAGNOSIS — Z12.31 BREAST CANCER SCREENING BY MAMMOGRAM: ICD-10-CM

## 2023-02-14 DIAGNOSIS — Z80.3 FAMILY HISTORY OF BREAST CANCER: ICD-10-CM

## 2023-02-14 PROCEDURE — A9585 GADOBUTROL INJECTION: HCPCS | Performed by: SURGERY

## 2023-02-14 PROCEDURE — 77049 MRI BREAST C-+ W/CAD BI: CPT

## 2023-02-14 PROCEDURE — 74011250636 HC RX REV CODE- 250/636: Performed by: SURGERY

## 2023-02-14 PROCEDURE — 77063 BREAST TOMOSYNTHESIS BI: CPT

## 2023-02-14 RX ADMIN — GADOBUTROL 13 ML: 604.72 INJECTION INTRAVENOUS at 14:30

## 2023-02-27 ENCOUNTER — PATIENT MESSAGE (OUTPATIENT)
Dept: INTERNAL MEDICINE CLINIC | Age: 38
End: 2023-02-27

## 2023-02-27 PROBLEM — G47.33 OSA (OBSTRUCTIVE SLEEP APNEA): Status: ACTIVE | Noted: 2023-02-24

## 2023-03-29 RX ORDER — LETROZOLE 2.5 MG/1
5 TABLET, FILM COATED ORAL DAILY
Qty: 10 TABLET | Refills: 3 | Status: SHIPPED | OUTPATIENT
Start: 2023-03-29 | End: 2023-04-03

## 2023-04-22 DIAGNOSIS — Z12.31 SCREENING MAMMOGRAM FOR HIGH-RISK PATIENT: Primary | ICD-10-CM

## 2023-10-05 DIAGNOSIS — F43.21 ADJUSTMENT DISORDER WITH DEPRESSED MOOD: ICD-10-CM

## 2023-10-05 DIAGNOSIS — F41.9 ANXIETY DISORDER, UNSPECIFIED: ICD-10-CM

## 2024-01-10 NOTE — PROGRESS NOTES
India Franklin is a 38 y.o. female presents for a new pregnancy visit.    Chief Complaint   Patient presents with    Initial Prenatal Visit       Patient's last menstrual period was 2023.    Last Pap: 2022 normal/HPV neg    LMP history:  The date of her LMP is 2023 certain.  Her last menstrual period was normal and lasted for 4 to 5 days.  A urine pregnancy test was positive 2023 . Patient was taking letrozole to conceive     Based on her LMP, her EDC is 2024 and her EGA is 8 weeks,6 days. Her menstrual cycles are irregular and occur approximately every 28 days and range from 3 to 5 days.      Ultrasound data:  She had an ultrasound done by the ultrasound tech today which revealed a viable beaver pregnancy with a gestational age of 8 weeks and 5 days giving an EDC of 2024.    TA ULTRASOUND PERFORMED DECREASED SCAN CLARITY A SINGLE VIABLE 8W5D IUP IS SEEN WITH NORMAL CARDIAC RHYTHM. GESTATIONAL AGE BASED ON TODAY'S ULTRASOUND. A NORMAL YOLK SAC IS SEEN. RIGHT OVARY APPEARS WITHIN NORMAL LIMITS. LEFT OVARY APPEARS WITHIN NORMAL LIMITS. NO FREE FLUID SEEN IN THE CDS.     Pregnancy symptoms:    Since her LMP she has experienced urinary frequency, breast tenderness, and nausea.   She has not been vomiting over the last few weeks.  Associated signs and symptoms which she denies: dysuria, discharge, vaginal bleeding.    She states she has gained weight:  Approximately 5 pounds over the last few weeks.    Relevant past pregnancy history:   She has the following pregnancy history:     She has no history of  delivery.    Relevant past medical history:(relevant to this pregnancy): noncontributory.      Her occupation is: .         Examination chaperoned by Marybeth Paez MA.

## 2024-01-18 ENCOUNTER — ROUTINE PRENATAL (OUTPATIENT)
Age: 39
End: 2024-01-18
Payer: COMMERCIAL

## 2024-01-18 VITALS — SYSTOLIC BLOOD PRESSURE: 121 MMHG | BODY MASS INDEX: 43.8 KG/M2 | DIASTOLIC BLOOD PRESSURE: 76 MMHG | WEIGHT: 293 LBS

## 2024-01-18 DIAGNOSIS — O09.529 SUPERVISION OF ELDERLY MULTIGRAVIDA, ANTEPARTUM: Primary | ICD-10-CM

## 2024-01-18 DIAGNOSIS — Z3A.08 8 WEEKS GESTATION OF PREGNANCY: ICD-10-CM

## 2024-01-18 PROCEDURE — 0501F PRENATAL FLOW SHEET: CPT | Performed by: OBSTETRICS & GYNECOLOGY

## 2024-01-18 PROCEDURE — 90674 CCIIV4 VAC NO PRSV 0.5 ML IM: CPT | Performed by: OBSTETRICS & GYNECOLOGY

## 2024-01-18 PROCEDURE — 90471 IMMUNIZATION ADMIN: CPT | Performed by: OBSTETRICS & GYNECOLOGY

## 2024-01-18 RX ORDER — PNV NO.95/FERROUS FUM/FOLIC AC 28MG-0.8MG
1 TABLET ORAL DAILY
Qty: 90 TABLET | Refills: 5 | Status: SHIPPED | OUTPATIENT
Start: 2024-01-18

## 2024-01-18 RX ORDER — ONDANSETRON 8 MG/1
8 TABLET, ORALLY DISINTEGRATING ORAL EVERY 8 HOURS PRN
Qty: 30 TABLET | Refills: 5 | Status: SHIPPED | OUTPATIENT
Start: 2024-01-18

## 2024-01-18 SDOH — ECONOMIC STABILITY: FOOD INSECURITY: WITHIN THE PAST 12 MONTHS, YOU WORRIED THAT YOUR FOOD WOULD RUN OUT BEFORE YOU GOT MONEY TO BUY MORE.: NEVER TRUE

## 2024-01-18 SDOH — ECONOMIC STABILITY: INCOME INSECURITY: HOW HARD IS IT FOR YOU TO PAY FOR THE VERY BASICS LIKE FOOD, HOUSING, MEDICAL CARE, AND HEATING?: NOT HARD AT ALL

## 2024-01-18 SDOH — ECONOMIC STABILITY: HOUSING INSECURITY
IN THE LAST 12 MONTHS, WAS THERE A TIME WHEN YOU DID NOT HAVE A STEADY PLACE TO SLEEP OR SLEPT IN A SHELTER (INCLUDING NOW)?: NO

## 2024-01-18 SDOH — ECONOMIC STABILITY: FOOD INSECURITY: WITHIN THE PAST 12 MONTHS, THE FOOD YOU BOUGHT JUST DIDN'T LAST AND YOU DIDN'T HAVE MONEY TO GET MORE.: NEVER TRUE

## 2024-01-18 ASSESSMENT — PATIENT HEALTH QUESTIONNAIRE - PHQ9
SUM OF ALL RESPONSES TO PHQ QUESTIONS 1-9: 0
SUM OF ALL RESPONSES TO PHQ9 QUESTIONS 1 & 2: 0
SUM OF ALL RESPONSES TO PHQ QUESTIONS 1-9: 0
1. LITTLE INTEREST OR PLEASURE IN DOING THINGS: 0
2. FEELING DOWN, DEPRESSED OR HOPELESS: 0

## 2024-01-18 NOTE — PROGRESS NOTES
Current pregnancy history:    India Franklin is a ,  38 y.o. female White (non-) Patient's last menstrual period was 2023..  She presents for the evaluation of amenorrhea and a positive pregnancy test.    Per nursing Note:  LMP history:  The date of her LMP is 2023 certain.  Her last menstrual period was normal and lasted for 4 to 5 days.  A urine pregnancy test was positive 2023 . Patient was taking letrozole to conceive      Based on her LMP, her EDC is 2024 and her EGA is 8 weeks,6 days. Her menstrual cycles are irregular and occur approximately every 28 days and range from 3 to 5 days.      Ultrasound data:  She had an ultrasound done by the ultrasound tech today which revealed a viable beaver pregnancy with a gestational age of 8 weeks and 5 days giving an EDC of 2024.     TA ULTRASOUND PERFORMED DECREASED SCAN CLARITY A SINGLE VIABLE 8W5D IUP IS SEEN WITH NORMAL CARDIAC RHYTHM. GESTATIONAL AGE BASED ON TODAY'S ULTRASOUND. A NORMAL YOLK SAC IS SEEN. RIGHT OVARY APPEARS WITHIN NORMAL LIMITS. LEFT OVARY APPEARS WITHIN NORMAL LIMITS. NO FREE FLUID SEEN IN THE CDS.      Pregnancy symptoms:     Since her LMP she has experienced urinary frequency, breast tenderness, and nausea.   She has not been vomiting over the last few weeks.  Associated signs and symptoms which she denies: dysuria, discharge, vaginal bleeding.     She states she has gained weight:  Approximately 5 pounds over the last few weeks.     Relevant past pregnancy history:              She has the following pregnancy history:                She has no history of  delivery.     Relevant past medical history:(relevant to this pregnancy): noncontributory.       Her occupation is: .     Substance history: negative for alcohol, tobacco and street drugs.           Positive for nothing.  Exposure history: There is/are no indoor cat/s in the home.  The patient was instructed to not change the

## 2024-01-19 LAB
ABO GROUP BLD: NORMAL
BACTERIA UR CULT: NO GROWTH
BASOPHILS # BLD AUTO: 0 X10E3/UL (ref 0–0.2)
BASOPHILS NFR BLD AUTO: 0 %
BLD GP AB SCN SERPL QL: NEGATIVE
EOSINOPHIL # BLD AUTO: 0 X10E3/UL (ref 0–0.4)
EOSINOPHIL NFR BLD AUTO: 0 %
ERYTHROCYTE [DISTWIDTH] IN BLOOD BY AUTOMATED COUNT: 13.7 % (ref 11.7–15.4)
HBV SURFACE AG SERPL QL IA: NEGATIVE
HCT VFR BLD AUTO: 42.7 % (ref 34–46.6)
HCV IGG SERPL QL IA: NON REACTIVE
HGB BLD-MCNC: 14.1 G/DL (ref 11.1–15.9)
HIV 1+2 AB+HIV1 P24 AG SERPL QL IA: NON REACTIVE
IMM GRANULOCYTES # BLD AUTO: 0 X10E3/UL (ref 0–0.1)
IMM GRANULOCYTES NFR BLD AUTO: 0 %
LYMPHOCYTES # BLD AUTO: 1.7 X10E3/UL (ref 0.7–3.1)
LYMPHOCYTES NFR BLD AUTO: 25 %
MCH RBC QN AUTO: 29.3 PG (ref 26.6–33)
MCHC RBC AUTO-ENTMCNC: 33 G/DL (ref 31.5–35.7)
MCV RBC AUTO: 89 FL (ref 79–97)
MONOCYTES # BLD AUTO: 0.4 X10E3/UL (ref 0.1–0.9)
MONOCYTES NFR BLD AUTO: 6 %
NEUTROPHILS # BLD AUTO: 4.7 X10E3/UL (ref 1.4–7)
NEUTROPHILS NFR BLD AUTO: 69 %
PLATELET # BLD AUTO: 248 X10E3/UL (ref 150–450)
RBC # BLD AUTO: 4.81 X10E6/UL (ref 3.77–5.28)
RH BLD: NEGATIVE
RUBV IGG SERPL IA-ACNC: 4.11 INDEX
WBC # BLD AUTO: 6.9 X10E3/UL (ref 3.4–10.8)

## 2024-01-20 LAB — TREPONEMA PALLIDUM IGG+IGM AB [PRESENCE] IN SERUM OR PLASMA BY IMMUNOASSAY: NON REACTIVE

## 2024-01-21 LAB
C TRACH RRNA SPEC QL NAA+PROBE: NEGATIVE
N GONORRHOEA RRNA SPEC QL NAA+PROBE: NEGATIVE
T VAGINALIS RRNA SPEC QL NAA+PROBE: NEGATIVE

## 2024-02-19 ENCOUNTER — ROUTINE PRENATAL (OUTPATIENT)
Age: 39
End: 2024-02-19

## 2024-02-19 VITALS
WEIGHT: 291.8 LBS | HEIGHT: 69 IN | DIASTOLIC BLOOD PRESSURE: 86 MMHG | BODY MASS INDEX: 43.22 KG/M2 | HEART RATE: 75 BPM | SYSTOLIC BLOOD PRESSURE: 131 MMHG

## 2024-02-19 DIAGNOSIS — R80.9 PROTEINURIA, UNSPECIFIED TYPE: ICD-10-CM

## 2024-02-19 DIAGNOSIS — Z3A.13 13 WEEKS GESTATION OF PREGNANCY: ICD-10-CM

## 2024-02-19 DIAGNOSIS — Z87.59 HISTORY OF GESTATIONAL HYPERTENSION: ICD-10-CM

## 2024-02-19 DIAGNOSIS — O09.529 SUPERVISION OF ELDERLY MULTIGRAVIDA, ANTEPARTUM: Primary | ICD-10-CM

## 2024-02-19 DIAGNOSIS — Z13.79 GENETIC TESTING: ICD-10-CM

## 2024-02-19 PROCEDURE — 0502F SUBSEQUENT PRENATAL CARE: CPT | Performed by: OBSTETRICS & GYNECOLOGY

## 2024-02-20 ENCOUNTER — ROUTINE PRENATAL (OUTPATIENT)
Age: 39
End: 2024-02-20
Payer: COMMERCIAL

## 2024-02-20 VITALS — DIASTOLIC BLOOD PRESSURE: 77 MMHG | HEART RATE: 70 BPM | SYSTOLIC BLOOD PRESSURE: 120 MMHG

## 2024-02-20 DIAGNOSIS — O99.211 MATERNAL MORBID OBESITY IN FIRST TRIMESTER, ANTEPARTUM (HCC): ICD-10-CM

## 2024-02-20 DIAGNOSIS — Z36.82 NUCHAL TRANSLUCENCY OF FETUS ON PRENATAL ULTRASOUND: ICD-10-CM

## 2024-02-20 DIAGNOSIS — O34.219 PREVIOUS CESAREAN DELIVERY, ANTEPARTUM CONDITION OR COMPLICATION: ICD-10-CM

## 2024-02-20 DIAGNOSIS — E66.01 MATERNAL MORBID OBESITY IN FIRST TRIMESTER, ANTEPARTUM (HCC): ICD-10-CM

## 2024-02-20 DIAGNOSIS — O09.529 SUPERVISION OF ELDERLY MULTIGRAVIDA, ANTEPARTUM: Primary | ICD-10-CM

## 2024-02-20 DIAGNOSIS — O09.299 HISTORY OF PRE-ECLAMPSIA IN PRIOR PREGNANCY, CURRENTLY PREGNANT: ICD-10-CM

## 2024-02-20 DIAGNOSIS — Z3A.13 13 WEEKS GESTATION OF PREGNANCY: ICD-10-CM

## 2024-02-20 LAB
ALBUMIN SERPL-MCNC: 3.4 G/DL (ref 3.5–5)
ALBUMIN/GLOB SERPL: 1 (ref 1.1–2.2)
ALP SERPL-CCNC: 75 U/L (ref 45–117)
ALT SERPL-CCNC: 23 U/L (ref 12–78)
ANION GAP SERPL CALC-SCNC: 5 MMOL/L (ref 5–15)
AST SERPL-CCNC: 11 U/L (ref 15–37)
BILIRUB SERPL-MCNC: 0.3 MG/DL (ref 0.2–1)
BUN SERPL-MCNC: 9 MG/DL (ref 6–20)
BUN/CREAT SERPL: 14 (ref 12–20)
CALCIUM SERPL-MCNC: 9 MG/DL (ref 8.5–10.1)
CHLORIDE SERPL-SCNC: 107 MMOL/L (ref 97–108)
CO2 SERPL-SCNC: 24 MMOL/L (ref 21–32)
CREAT SERPL-MCNC: 0.64 MG/DL (ref 0.55–1.02)
GLOBULIN SER CALC-MCNC: 3.5 G/DL (ref 2–4)
GLUCOSE SERPL-MCNC: 92 MG/DL (ref 65–100)
POTASSIUM SERPL-SCNC: 3.8 MMOL/L (ref 3.5–5.1)
PROT SERPL-MCNC: 6.9 G/DL (ref 6.4–8.2)
SODIUM SERPL-SCNC: 136 MMOL/L (ref 136–145)

## 2024-02-20 PROCEDURE — 99203 OFFICE O/P NEW LOW 30 MIN: CPT | Performed by: OBSTETRICS & GYNECOLOGY

## 2024-02-20 PROCEDURE — 76801 OB US < 14 WKS SINGLE FETUS: CPT | Performed by: OBSTETRICS & GYNECOLOGY

## 2024-02-20 PROCEDURE — 76813 OB US NUCHAL MEAS 1 GEST: CPT | Performed by: OBSTETRICS & GYNECOLOGY

## 2024-02-20 RX ORDER — ASPIRIN 81 MG/1
81 TABLET ORAL DAILY
COMMUNITY

## 2024-02-20 NOTE — PROGRESS NOTES
Please see my progress or consultation note in Viewpoint filed under the Media and/or Imaging Tab in [employer].     Please discuss with patient and consider obtaining maternal carrier screening panel such as Horizon-14 or equivalent.      Thank you.

## 2024-02-22 LAB
CREAT UR-MCNC: 283 MG/DL
PROT UR-MCNC: 6 MG/DL (ref 0–11.9)
PROT/CREAT UR-RTO: 0

## 2024-02-23 LAB
Lab: NORMAL

## 2024-03-15 ENCOUNTER — ROUTINE PRENATAL (OUTPATIENT)
Age: 39
End: 2024-03-15

## 2024-03-15 VITALS — BODY MASS INDEX: 42.83 KG/M2 | DIASTOLIC BLOOD PRESSURE: 66 MMHG | SYSTOLIC BLOOD PRESSURE: 102 MMHG | WEIGHT: 290 LBS

## 2024-03-15 DIAGNOSIS — O09.529 SUPERVISION OF ELDERLY MULTIGRAVIDA, ANTEPARTUM: ICD-10-CM

## 2024-03-15 DIAGNOSIS — O09.529 SUPERVISION OF ELDERLY MULTIGRAVIDA, ANTEPARTUM: Primary | ICD-10-CM

## 2024-03-15 DIAGNOSIS — Z3A.17 17 WEEKS GESTATION OF PREGNANCY: ICD-10-CM

## 2024-03-15 NOTE — PROGRESS NOTES
Patient Active Problem List    Diagnosis Date Noted    Supervision of elderly multigravida, antepartum 01/18/2024     Flu vaccine 1/18  Intrauterine pregnancy with the following problems identified:  EDC 8/23/24 D=US  AMA 37 at delivery  NIPTS normal male   Horizon  Femara pregnancy  Hx of LTCS documented fetal intolerance to labor - repeat CS with bilateral salpingectomy  Hx of GHTN - LDA 12 weeks  Flu vaccine 1/24  Rh negative  Baseline PCR 0.0       TESSA (obstructive sleep apnea) 02/24/2023     mild TESSA on [employer] home sleep test.  AHI 7.3.  16 apneas in 14.8 hours        Exercise-induced asthma     Obesity, morbid (HCC) 08/24/2018    At high risk for breast cancer 05/08/2017     Gideoner-Ramon risk 24.3 %        Dysmenorrhea     Family history of breast cancer 09/15/2014     Mother diagnosed with breast cancer age 35(Trina Mejia ), maternal   cousin, maternal aunt also with breast cancer all post menopausal. Her   mother does not carry the BRCA gene.

## 2024-03-20 LAB
AFP INTERP SERPL-IMP: NORMAL
AFP MOM SERPL: 0.82
AFP SERPL-MCNC: 21.6 NG/ML
AGE AT DELIVERY: 37 YR
AGE OF EGG DONOR: NORMAL
AGE OF EGG DONOR: NORMAL
COMMENT: NORMAL
DONOR EGG?: NO
DONOR EGG?: NORMAL
FAMILY HISTORY NTD: NORMAL
FHX NTD (Y OR N): NORMAL
GA METHOD: NORMAL
GA: 17 WEEKS
IDDM PATIENT QL: NO
INSULIN DEP. DIABETIC: NORMAL
Lab: 290
Lab: NORMAL
Lab: NORMAL
MAT SCN FOR FETAL ABNORMALITIES SERPL: NORMAL
MULTIPLE PREGNANCY: NO
NEURAL TUBE DEFECT RISK FETUS: NORMAL
NUMBER OF FETUSES: NO
OTHER INDICATIONS: NO
OTHER INDICATIONS: NORMAL
PREVIOUSLY ELEVATED AFP (Y OR N): 17
PREVIOUSLY ELEVATED AFP (Y OR N): NO
PRIOR 1ST TRIM TESTING ?: NO
PRIOR 2ND TRIM TESTING ?: NO
PRIOR DS/NTD SCREEN CURRENT PREGNANCY?: NO
PRIOR DS/NTD SCREEN CURRENT PREGNANCY?: NORMAL
PRIOR FIRST TRIMESTER TESTING (Y OR N ): NORMAL
PRIOR PREGNANCY WITH DOWN SYNDROME (Y OR N): 1
PRIOR PREGNANCY WITH DOWN SYNDROME (Y OR N): NO
TYPE OF EGG DONOR: NORMAL
TYPE OF EGG DONOR: NORMAL

## 2024-04-05 ENCOUNTER — ROUTINE PRENATAL (OUTPATIENT)
Age: 39
End: 2024-04-05

## 2024-04-05 VITALS
HEART RATE: 82 BPM | BODY MASS INDEX: 43.3 KG/M2 | WEIGHT: 293 LBS | DIASTOLIC BLOOD PRESSURE: 72 MMHG | SYSTOLIC BLOOD PRESSURE: 112 MMHG

## 2024-04-05 DIAGNOSIS — O09.522 ADVANCED MATERNAL AGE IN MULTIGRAVIDA, SECOND TRIMESTER: ICD-10-CM

## 2024-04-05 DIAGNOSIS — G47.33 OSA (OBSTRUCTIVE SLEEP APNEA): ICD-10-CM

## 2024-04-05 DIAGNOSIS — O99.211 MATERNAL MORBID OBESITY IN FIRST TRIMESTER, ANTEPARTUM (HCC): Primary | ICD-10-CM

## 2024-04-05 DIAGNOSIS — E66.01 MATERNAL MORBID OBESITY IN FIRST TRIMESTER, ANTEPARTUM (HCC): Primary | ICD-10-CM

## 2024-04-05 DIAGNOSIS — O09.529 SUPERVISION OF ELDERLY MULTIGRAVIDA, ANTEPARTUM: ICD-10-CM

## 2024-04-05 DIAGNOSIS — E66.01 MORBID OBESITY WITH BMI OF 40.0-44.9, ADULT (HCC): ICD-10-CM

## 2024-04-05 DIAGNOSIS — O34.219 PREVIOUS CESAREAN SECTION COMPLICATING PREGNANCY, ANTEPARTUM CONDITION OR COMPLICATION: ICD-10-CM

## 2024-04-05 RX ORDER — ASPIRIN 81 MG/1
81 TABLET ORAL DAILY
COMMUNITY

## 2024-04-05 NOTE — PROCEDURES
PATIENT: ELIOT PICHARDOF   -  : 1985   -  DOS:2024   -  INTERPRETING PROVIDER:Asher Echavarria,   Indication  ========    AMA, BMI 44, previous C/S for FTP, hx of PIH.    Method  ======    Transabdominal ultrasound examination. View: Sufficient    Dating  ======    LMP on: 2023  GA by LMP 20 w + 0 d  KRISSY by LMP: 2024  Previous Ultrasound on: 2024  Type of prior assessment: GA  GA at prior assessment date 8 w + 5 d  GA by previous U/S 19 w + 6 d  KRISSY by previous Ultrasound: 2024  Ultrasound examination on: 2024  GA by U/S based upon: AC, BPD, Femur, HC  GA by U/S 19 w + 5 d  KRISSY by U/S: 2024  Assigned: based on the LMP, selected on 2024  Assigned GA 20 w + 0 d  Assigned KRISSY: 2024    Fetal Growth Overview  =================    Exam date        GA              BPD (mm)          HC (mm)              AC (mm)              FL (mm)             HL (mm)          EFW (g)  2024          20w 0d        45.2    35%        174.9     42%        147.7    46%        29.7     16%        29    34%        306     28%    Fetal Biometry  ============    Standard  BPD 45.2 mm 19w 5d 35% Hadlock  OFD 63.5 mm 21w 4d 94% Yelena  .9 mm 20w 0d 42% Hadlock  Cerebellum tr 20.2 mm 19w 3d 55% Hill  Nuchal fold 3.3 mm  .7 mm 20w 0d 46% Hadlock  Femur 29.7 mm 19w 1d 16% Hadlock  Humerus 29.0 mm 19w 3d 34% Yelena   g 19w 4d 28% Hadlock  EFW (lb) 0 lb  EFW (oz) 11 oz  EFW by: Hadlock (BPD-HC-AC-FL)  Extended   5.6 mm  CM 6.6 mm  90% Nicolaides  Nasal bone 6.6 mm  Head / Face / Neck  Nasal bone: present  Other Structures   bpm    General Evaluation  ==============    Cardiac activity present.  bpm. Fetal movements: visualized. Presentation: Transverse, head to maternal right  Placenta: Placental site: anterior, appropriate distance from the internal os. Placental edge-to-cervical os distance 5.8 cm  Umbilical cord: Cord vessels: 3 vessel cord. Insertion site: 
alert

## 2024-04-08 ENCOUNTER — ROUTINE PRENATAL (OUTPATIENT)
Age: 39
End: 2024-04-08

## 2024-04-08 VITALS — BODY MASS INDEX: 43.18 KG/M2 | WEIGHT: 292.4 LBS | DIASTOLIC BLOOD PRESSURE: 70 MMHG | SYSTOLIC BLOOD PRESSURE: 101 MMHG

## 2024-04-08 DIAGNOSIS — O09.529 SUPERVISION OF ELDERLY MULTIGRAVIDA, ANTEPARTUM: Primary | ICD-10-CM

## 2024-04-08 DIAGNOSIS — Z3A.20 20 WEEKS GESTATION OF PREGNANCY: ICD-10-CM

## 2024-04-08 DIAGNOSIS — E66.01 OBESITY, MORBID (HCC): ICD-10-CM

## 2024-04-08 PROCEDURE — 0502F SUBSEQUENT PRENATAL CARE: CPT | Performed by: OBSTETRICS & GYNECOLOGY

## 2024-04-08 NOTE — PROGRESS NOTES
Patient Active Problem List    Diagnosis Date Noted    Supervision of elderly multigravida, antepartum 01/18/2024     Flu vaccine 1/18  Intrauterine pregnancy with the following problems identified:  EDC 8/23/24 D=US  AMA 37 at delivery  NIPTS normal male   Horizon  Femara pregnancy  Hx of LTCS documented fetal intolerance to labor - repeat CS with bilateral salpingectomy  Hx of GHTN - LDA 12 weeks  Flu vaccine 1/24  Rh negative  Baseline PCR 0.0   AFP- neg      TESSA (obstructive sleep apnea) 02/24/2023     mild TESSA on [employer] home sleep test.  AHI 7.3.  16 apneas in 14.8 hours        Exercise-induced asthma     Obesity, morbid (HCC) 08/24/2018    At high risk for breast cancer 05/08/2017     Gideoner-Ramon risk 24.3 %        Dysmenorrhea     Family history of breast cancer 09/15/2014     Mother diagnosed with breast cancer age 35(Trina Mejia ), maternal   cousin, maternal aunt also with breast cancer all post menopausal. Her   mother does not carry the BRCA gene.

## 2024-04-09 LAB — GLUCOSE 1H P 50 G GLC PO SERPL-MCNC: 89 MG/DL (ref 70–139)

## 2024-04-23 DIAGNOSIS — F43.21 ADJUSTMENT DISORDER WITH DEPRESSED MOOD: ICD-10-CM

## 2024-04-23 DIAGNOSIS — F41.9 ANXIETY DISORDER, UNSPECIFIED: ICD-10-CM

## 2024-05-03 ENCOUNTER — ROUTINE PRENATAL (OUTPATIENT)
Age: 39
End: 2024-05-03

## 2024-05-03 VITALS — SYSTOLIC BLOOD PRESSURE: 101 MMHG | DIASTOLIC BLOOD PRESSURE: 67 MMHG | BODY MASS INDEX: 43.27 KG/M2 | WEIGHT: 293 LBS

## 2024-05-03 DIAGNOSIS — Z3A.24 24 WEEKS GESTATION OF PREGNANCY: ICD-10-CM

## 2024-05-03 DIAGNOSIS — E66.01 OBESITY, MORBID (HCC): ICD-10-CM

## 2024-05-03 DIAGNOSIS — O09.529 SUPERVISION OF ELDERLY MULTIGRAVIDA, ANTEPARTUM: Primary | ICD-10-CM

## 2024-05-03 DIAGNOSIS — Z80.3 FAMILY HISTORY OF BREAST CANCER: ICD-10-CM

## 2024-05-03 PROCEDURE — 0502F SUBSEQUENT PRENATAL CARE: CPT | Performed by: OBSTETRICS & GYNECOLOGY

## 2024-05-03 NOTE — PROGRESS NOTES
Doing well  Baby moving  Glucola and rhogam next visit  CS with joel salpingectomy on 8/19 - needs to sign consent with delivery next visit

## 2024-05-03 NOTE — PROGRESS NOTES
Patient Active Problem List    Diagnosis Date Noted    Supervision of elderly multigravida, antepartum 01/18/2024     Flu vaccine 1/18  Intrauterine pregnancy with the following problems identified:  EDC 8/23/24 D=US  AMA 37 at delivery  NIPTS normal male   Horizon negative (2019)  Femara pregnancy  Hx of LTCS documented fetal intolerance to labor - repeat CS with bilateral salpingectomy  Hx of GHTN - LDA 12 weeks  Flu vaccine 1/24  Rh negative  Baseline PCR 0.0   AFP- neg  Early GTT normal      TESSA (obstructive sleep apnea) 02/24/2023     mild TESSA on [employer] home sleep test.  AHI 7.3.  16 apneas in 14.8 hours        Exercise-induced asthma     Obesity, morbid (HCC) 08/24/2018    At high risk for breast cancer 05/08/2017     Priscilla risk 24.3 %        Dysmenorrhea     Family history of breast cancer 09/15/2014     Mother diagnosed with breast cancer age 35(Trina Mejia ), maternal   cousin, maternal aunt also with breast cancer all post menopausal. Her   mother does not carry the BRCA gene.

## 2024-05-07 ENCOUNTER — ROUTINE PRENATAL (OUTPATIENT)
Age: 39
End: 2024-05-07
Payer: COMMERCIAL

## 2024-05-07 VITALS — HEART RATE: 92 BPM | SYSTOLIC BLOOD PRESSURE: 109 MMHG | DIASTOLIC BLOOD PRESSURE: 71 MMHG

## 2024-05-07 DIAGNOSIS — O99.211 MATERNAL MORBID OBESITY IN FIRST TRIMESTER, ANTEPARTUM (HCC): Primary | ICD-10-CM

## 2024-05-07 DIAGNOSIS — E66.01 MATERNAL MORBID OBESITY IN FIRST TRIMESTER, ANTEPARTUM (HCC): Primary | ICD-10-CM

## 2024-05-07 PROCEDURE — 99212 OFFICE O/P EST SF 10 MIN: CPT | Performed by: OBSTETRICS & GYNECOLOGY

## 2024-05-07 PROCEDURE — 76816 OB US FOLLOW-UP PER FETUS: CPT | Performed by: OBSTETRICS & GYNECOLOGY

## 2024-05-10 NOTE — PROCEDURES
view: normal  3-vessel-trachea view: normal  Heart / Thorax  Situs: situs solitus (normal)  Aortic arch view: SUBOPTIMAL  Bicaval view: normal  Ductal arch view: normal  Interventricular septum: normal  Cardiac position: levocardia (normal)  Cardiac axis: normal  Cardiac size: normal (approx. 1/3 of thoracic area)  Cardiac rhythm: regular (normal)  Cord insertion: SUBOPTIMAL  Stomach: normal  Kidneys: normal  Bladder: normal  Wants to know fetal sex: no    Maternal Structures  ===============    Uterus / Cervix  Cervix: Normal  Approach: Transabdominal  Cervical length 4.90 cm    Findings  =======    Viable Peterson pregnancy at 24w 4d by clinical dates.  EFW is 671 g at 25%, abdominal circumference at 42%.  Anatomy visualized as stated above.  Amniotic fluid is normal.  Placenta is anterior, appropriate distance from the internal os.  Transverse, head to maternal left presentation.    Consultation  ==========    38-year-old G2, P1 whose pregnancy is complicated by:  CC: AMA, BMI 44, previous C/S for FTP, hx of PIH.    AMA  - Genetic hx is negative for MR, chromosomal problems or structural defects, hereditary conditions, congenital structural defects or recurrent pregnancy losses.  - LR male NIPT  - Patient understands the limitations of NIPT and has been previously counseled about the opportunity for definitive genetic testing. Declined amniocentesis.    Hx of GHTN/preeclampsia.  -prescribed ASA 81mg qd  - 24 CMP & Upro/Cr WNL  - 24 CBC WNL    Morbid obesity  - Early GDM screening 2024 was WNL; would repeat at 28 weeks    Previous  section  - Performed for FTP in the setting of pre-E.  - anterior placenta not low-lying    Recommendations:  - Return to your office for obstetric care and delivery.  - Serial fetal growth every 4 weeks following her next growth evaluation.  - Start  fetal testing at 34 weeks.  - Delivery recommendations TBD    Patient was counseled on the findings.

## 2024-05-30 ENCOUNTER — ROUTINE PRENATAL (OUTPATIENT)
Age: 39
End: 2024-05-30
Payer: COMMERCIAL

## 2024-05-30 VITALS — BODY MASS INDEX: 43.98 KG/M2 | SYSTOLIC BLOOD PRESSURE: 135 MMHG | WEIGHT: 293 LBS | DIASTOLIC BLOOD PRESSURE: 74 MMHG

## 2024-05-30 DIAGNOSIS — Z3A.27 27 WEEKS GESTATION OF PREGNANCY: ICD-10-CM

## 2024-05-30 DIAGNOSIS — O09.529 SUPERVISION OF ELDERLY MULTIGRAVIDA, ANTEPARTUM: Primary | ICD-10-CM

## 2024-05-30 PROCEDURE — 0502F SUBSEQUENT PRENATAL CARE: CPT | Performed by: OBSTETRICS & GYNECOLOGY

## 2024-05-30 PROCEDURE — 90715 TDAP VACCINE 7 YRS/> IM: CPT | Performed by: OBSTETRICS & GYNECOLOGY

## 2024-05-30 PROCEDURE — 90471 IMMUNIZATION ADMIN: CPT | Performed by: OBSTETRICS & GYNECOLOGY

## 2024-05-30 NOTE — PROGRESS NOTES
Patient Active Problem List    Diagnosis Date Noted    Supervision of elderly multigravida, antepartum 01/18/2024     Flu vaccine 1/18  Intrauterine pregnancy with the following problems identified:  EDC 8/23/24 D=US  AMA 39 at delivery  NIPTS normal male   Horizon negative (2019)  Femara pregnancy  Hx of LTCS documented fetal intolerance to labor - repeat CS with bilateral salpingectomy  Hx of GHTN - LDA 12 weeks  Flu vaccine 1/24  Rh negative  Baseline PCR 0.0   AFP- neg  Early GTT normal  Repeat C/S bilateral salpingectomy 8/19/24       TESSA (obstructive sleep apnea) 02/24/2023     mild TESSA on [employer] home sleep test.  AHI 7.3.  16 apneas in 14.8 hours        Exercise-induced asthma     Obesity, morbid (HCC) 08/24/2018    At high risk for breast cancer 05/08/2017     Priscilla risk 24.3 %        Dysmenorrhea     Family history of breast cancer 09/15/2014     Mother diagnosed with breast cancer age 35(Trina Mejia ), maternal   cousin, maternal aunt also with breast cancer all post menopausal. Her   mother does not carry the BRCA gene.

## 2024-05-30 NOTE — PROGRESS NOTES
Baby moving  Disc consent  Disc joel salpingectomy again - permanent and irreversible - pt expressed understanding  Has BIANKA Cantu and tdap today

## 2024-05-31 LAB
BASOPHILS # BLD AUTO: 0 X10E3/UL (ref 0–0.2)
BASOPHILS NFR BLD AUTO: 0 %
EOSINOPHIL # BLD AUTO: 0 X10E3/UL (ref 0–0.4)
EOSINOPHIL NFR BLD AUTO: 0 %
ERYTHROCYTE [DISTWIDTH] IN BLOOD BY AUTOMATED COUNT: 13.4 % (ref 11.7–15.4)
GLUCOSE 1H P 50 G GLC PO SERPL-MCNC: 114 MG/DL (ref 70–139)
HCT VFR BLD AUTO: 36.4 % (ref 34–46.6)
HGB BLD-MCNC: 12.1 G/DL (ref 11.1–15.9)
IMM GRANULOCYTES # BLD AUTO: 0.1 X10E3/UL (ref 0–0.1)
IMM GRANULOCYTES NFR BLD AUTO: 1 %
LYMPHOCYTES # BLD AUTO: 1.3 X10E3/UL (ref 0.7–3.1)
LYMPHOCYTES NFR BLD AUTO: 16 %
MCH RBC QN AUTO: 30.9 PG (ref 26.6–33)
MCHC RBC AUTO-ENTMCNC: 33.2 G/DL (ref 31.5–35.7)
MCV RBC AUTO: 93 FL (ref 79–97)
MONOCYTES # BLD AUTO: 0.4 X10E3/UL (ref 0.1–0.9)
MONOCYTES NFR BLD AUTO: 5 %
NEUTROPHILS # BLD AUTO: 6.4 X10E3/UL (ref 1.4–7)
NEUTROPHILS NFR BLD AUTO: 78 %
PLATELET # BLD AUTO: 184 X10E3/UL (ref 150–450)
RBC # BLD AUTO: 3.91 X10E6/UL (ref 3.77–5.28)
WBC # BLD AUTO: 8.2 X10E3/UL (ref 3.4–10.8)

## 2024-06-01 LAB — TREPONEMA PALLIDUM IGG+IGM AB [PRESENCE] IN SERUM OR PLASMA BY IMMUNOASSAY: NON REACTIVE

## 2024-06-04 ENCOUNTER — ROUTINE PRENATAL (OUTPATIENT)
Age: 39
End: 2024-06-04
Payer: COMMERCIAL

## 2024-06-04 VITALS — HEART RATE: 74 BPM | DIASTOLIC BLOOD PRESSURE: 68 MMHG | SYSTOLIC BLOOD PRESSURE: 113 MMHG

## 2024-06-04 DIAGNOSIS — E66.01 MATERNAL MORBID OBESITY IN FIRST TRIMESTER, ANTEPARTUM (HCC): Primary | ICD-10-CM

## 2024-06-04 DIAGNOSIS — O99.211 MATERNAL MORBID OBESITY IN FIRST TRIMESTER, ANTEPARTUM (HCC): Primary | ICD-10-CM

## 2024-06-04 PROCEDURE — 99212 OFFICE O/P EST SF 10 MIN: CPT | Performed by: OBSTETRICS & GYNECOLOGY

## 2024-06-04 PROCEDURE — 76816 OB US FOLLOW-UP PER FETUS: CPT | Performed by: OBSTETRICS & GYNECOLOGY

## 2024-06-04 NOTE — PROCEDURES
PATIENT: SHYANNE PICHARDO   -  : 1985   -  DOS:2024   -  INTERPRETING PROVIDER:Deepak Rosales,   Indication  ========    AMA, BMI 44, previous C/S for FTP, hx of PIH.    Method  ======    Transabdominal ultrasound examination. View: Suboptimal view: limited by maternal body habitus and fetal position    Pregnancy  =========    Peterson pregnancy. Number of fetuses: 1    Dating  ======    LMP on: 2023  GA by LMP 28 w + 4 d  KRISSY by LMP: 2024  Previous Ultrasound on: 2024  Type of prior assessment: GA  GA at prior assessment date 8 w + 5 d  GA by previous U/S 28 w + 3 d  KRISSY by previous Ultrasound: 2024  Ultrasound examination on: 2024  GA by U/S based upon: AC, BPD, Femur, HC  GA by U/S 29 w + 5 d  KRISSY by U/S: 8/15/2024  Assigned: based on the LMP, selected on 2024  Assigned GA 28 w + 4 d  Assigned KRISSY: 2024    Fetal Biometry  ============    Standard  BPD 75.6 mm 30w 2d 88% Hadlock  .5 mm 32w 4d >99% Yelena  .5 mm 30w 5d 82% Hadlock  .2 mm 29w 5d 76% Hadlock  Femur 53.0 mm 28w 1d 24% Hadlock  EFW 1,377 g 29w 0d 66% Hadlock  EFW (lb) 3 lb  EFW (oz) 1 oz  EFW by: Hadlock (BPD-HC-AC-FL)  Extended   3.4 mm  Other Structures   bpm    General Evaluation  ==============    Cardiac activity present.  bpm. Fetal movements: visualized. Presentation: BREECH  Placenta: Placental site: anterior, appropriate distance from the internal os  Umbilical cord: Cord vessels: 3 vessel cord. Insertion site: central  Amniotic fluid: Amount of AF: normal. MVP 6.4 cm. JAQUELINE 15.4 cm. Q1 2.9 cm, Q2 6.4 cm, Q3 2.8 cm, Q4 3.3 cm    Fetal Anatomy  ===========    Cranium: normal  Lateral ventricles: normal  4-chamber view: documented previously  Heart / Thorax  Situs: situs solitus (normal)  Aortic arch view: SUBOPTIMAL  Cardiac position: levocardia (normal)  Cardiac axis: normal  Cardiac size: normal (approx. 1/3 of thoracic area)  Cardiac rhythm: regular

## 2024-06-14 ENCOUNTER — ROUTINE PRENATAL (OUTPATIENT)
Age: 39
End: 2024-06-14

## 2024-06-14 VITALS — SYSTOLIC BLOOD PRESSURE: 106 MMHG | BODY MASS INDEX: 45.04 KG/M2 | WEIGHT: 293 LBS | DIASTOLIC BLOOD PRESSURE: 68 MMHG

## 2024-06-14 DIAGNOSIS — O09.529 SUPERVISION OF ELDERLY MULTIGRAVIDA, ANTEPARTUM: Primary | ICD-10-CM

## 2024-06-14 DIAGNOSIS — E66.01 OBESITY, MORBID (HCC): ICD-10-CM

## 2024-06-14 DIAGNOSIS — Z3A.30 30 WEEKS GESTATION OF PREGNANCY: ICD-10-CM

## 2024-06-14 NOTE — PROGRESS NOTES
Patient Active Problem List    Diagnosis Date Noted    Supervision of elderly multigravida, antepartum 01/18/2024     Flu vaccine 1/18  Intrauterine pregnancy with the following problems identified:  EDC 8/23/24 D=US  AMA 37 at delivery  NIPTS normal male   Horizon negative (2019)  Femara pregnancy  Hx of LTCS documented fetal intolerance to labor - repeat CS with bilateral salpingectomy  Hx of GHTN - LDA 12 weeks  Flu vaccine 1/24  Rh negative  Baseline PCR 0.0   AFP- neg  Early GTT normal  Repeat C/S bilateral salpingectomy 8/19/24   1hr GTT- normal      TESSA (obstructive sleep apnea) 02/24/2023     mild TESSA on [employer] home sleep test.  AHI 7.3.  16 apneas in 14.8 hours        Exercise-induced asthma     Obesity, morbid (HCC) 08/24/2018    At high risk for breast cancer 05/08/2017     Priscilla risk 24.3 %        Dysmenorrhea     Family history of breast cancer 09/15/2014     Mother diagnosed with breast cancer age 35(Trina Mejia ), maternal   cousin, maternal aunt also with breast cancer all post menopausal. Her   mother does not carry the BRCA gene.

## 2024-07-02 ENCOUNTER — ROUTINE PRENATAL (OUTPATIENT)
Age: 39
End: 2024-07-02

## 2024-07-02 VITALS — BODY MASS INDEX: 44.89 KG/M2 | WEIGHT: 293 LBS | SYSTOLIC BLOOD PRESSURE: 118 MMHG | DIASTOLIC BLOOD PRESSURE: 74 MMHG

## 2024-07-02 DIAGNOSIS — O09.529 SUPERVISION OF ELDERLY MULTIGRAVIDA, ANTEPARTUM: Primary | ICD-10-CM

## 2024-07-02 DIAGNOSIS — Z3A.32 32 WEEKS GESTATION OF PREGNANCY: ICD-10-CM

## 2024-07-02 DIAGNOSIS — E66.01 OBESITY, MORBID (HCC): ICD-10-CM

## 2024-07-02 PROCEDURE — 0502F SUBSEQUENT PRENATAL CARE: CPT | Performed by: OBSTETRICS & GYNECOLOGY

## 2024-07-19 ENCOUNTER — ROUTINE PRENATAL (OUTPATIENT)
Age: 39
End: 2024-07-19

## 2024-07-19 VITALS — HEART RATE: 74 BPM | SYSTOLIC BLOOD PRESSURE: 107 MMHG | DIASTOLIC BLOOD PRESSURE: 72 MMHG

## 2024-07-19 VITALS — WEIGHT: 293 LBS | DIASTOLIC BLOOD PRESSURE: 77 MMHG | SYSTOLIC BLOOD PRESSURE: 112 MMHG | BODY MASS INDEX: 45.19 KG/M2

## 2024-07-19 DIAGNOSIS — O09.522 ADVANCED MATERNAL AGE IN MULTIGRAVIDA, SECOND TRIMESTER: Primary | ICD-10-CM

## 2024-07-19 DIAGNOSIS — O09.299 HISTORY OF PRE-ECLAMPSIA IN PRIOR PREGNANCY, CURRENTLY PREGNANT: ICD-10-CM

## 2024-07-19 DIAGNOSIS — O99.211 MATERNAL MORBID OBESITY IN FIRST TRIMESTER, ANTEPARTUM (HCC): Primary | ICD-10-CM

## 2024-07-19 DIAGNOSIS — O34.219 PREVIOUS CESAREAN SECTION COMPLICATING PREGNANCY, ANTEPARTUM CONDITION OR COMPLICATION: ICD-10-CM

## 2024-07-19 DIAGNOSIS — E66.01 MATERNAL MORBID OBESITY IN FIRST TRIMESTER, ANTEPARTUM (HCC): ICD-10-CM

## 2024-07-19 DIAGNOSIS — O99.211 MATERNAL MORBID OBESITY IN FIRST TRIMESTER, ANTEPARTUM (HCC): ICD-10-CM

## 2024-07-19 DIAGNOSIS — E66.01 MATERNAL MORBID OBESITY IN FIRST TRIMESTER, ANTEPARTUM (HCC): Primary | ICD-10-CM

## 2024-07-19 DIAGNOSIS — O34.219 PREVIOUS CESAREAN DELIVERY, ANTEPARTUM CONDITION OR COMPLICATION: ICD-10-CM

## 2024-07-19 DIAGNOSIS — Z3A.35 35 WEEKS GESTATION OF PREGNANCY: ICD-10-CM

## 2024-07-19 DIAGNOSIS — O09.529 SUPERVISION OF ELDERLY MULTIGRAVIDA, ANTEPARTUM: Primary | ICD-10-CM

## 2024-07-19 DIAGNOSIS — O09.522 ADVANCED MATERNAL AGE IN MULTIGRAVIDA, SECOND TRIMESTER: ICD-10-CM

## 2024-07-19 NOTE — PROGRESS NOTES
Assessment & Plan   ASSESSMENT/PLAN:  1. Advanced maternal age in multigravida, second trimester  2. History of pre-eclampsia in prior pregnancy, currently pregnant  3. Maternal morbid obesity in first trimester, antepartum (HCC)  4. Previous  delivery, antepartum condition or complication    38-year-old G2, P1 whose pregnancy is complicated by:     AMA  -LR NIPT(Does not wish to know sex); Declined amniocentesis.  -Kick count instructions, PIH and PTL precautions reviewed.     Hx of GHTN/preeclampsia.  -prescribed ASA 81mg qd  -24 CMP & Upro/Cr WNL  -24 and 24 CBC WNL     Morbid obesity  -24: Early 1HR GTT 89 WNL; 24: 1 Hr  WNL     Previous  section  -Performed for FTP in the setting of pre-E.  -Anterior placenta not low-lying  -R C/S scheduled 24    Contractions  -Pt ctx q 3-4 min on NST. Pt denies feeling ctx and mild by palpation. Pt has appt with OB today after MFM. Denies vaginal bleeding, leaking or pain. OB notified.     Recommendations  Begin weekly  testing  Interval Growth as scheduled-Next in 4 weeks    Please see Viewpoint for ultrasound findings.     Subjective   India Franklin (:  1985) is a 38 y.o. female,Established patient, here for evaluation of the following chief complaint(s):  1. Advanced maternal age in multigravida, second trimester  2. History of pre-eclampsia in prior pregnancy, currently pregnant  3. Maternal morbid obesity in first trimester, antepartum (HCC)  4. Previous  delivery, antepartum condition or complication     Objective   Physical Exam  Vitals reviewed.   Constitutional:       Appearance: Normal appearance.   Neurological:      Mental Status: She is alert.   Psychiatric:         Mood and Affect: Mood normal.         Judgment: Judgment normal.        On this date 2024 I have spent 15 minutes reviewing previous notes, test results and face to face with the patient discussing the diagnosis and

## 2024-07-19 NOTE — PROCEDURES
PATIENT: SHYANNE PICHARDO   -  : 1985   -  DOS:2024   -  INTERPRETING PROVIDER:Lisbeth Escamilla,   Indication  ========    AMA, BMI 44, previous C/S for FTP, hx of PIH.    Method  ======    Transabdominal ultrasound examination. View: Suboptimal view: limited by maternal body habitus and late gestational age    Pregnancy  =========    Peterson pregnancy. Number of fetuses: 1    Dating  ======    LMP on: 2023  GA by LMP 35 w + 0 d  KRISSY by LMP: 2024  Previous Ultrasound on: 2024  Type of prior assessment: GA  GA at prior assessment date 8 w + 5 d  GA by previous U/S 34 w + 6 d  KRISSY by previous Ultrasound: 2024  Ultrasound examination on: 2024  GA by U/S based upon: AC, BPD, Femur, HC  GA by U/S 36 w + 1 d  KRISSY by U/S: 8/15/2024  Assigned: based on the LMP, selected on 2024  Assigned GA 35 w + 0 d  Assigned KRISSY: 2024    Fetal Biometry  ============    Standard  BPD 90.0 mm 36w 3d 88% Hadlock  .9 mm -/- >99% Yelena  .6 mm 40w 3d >99% Hadlock  .2 mm 34w 3d 41% Hadlock  Femur 64.8 mm 33w 3d 10% Hadlock  EFW 2,561 g 34w 6d 46% Hadlock  EFW (lb) 5 lb  EFW (oz) 10 oz  EFW by: Hadlock (BPD-HC-AC-FL)  Extended   5.7 mm  Other Structures   bpm    General Evaluation  ==============    Cardiac activity present.  bpm. Fetal movements: visualized. Presentation: BREECH  Placenta: Placental site: anterior, appropriate distance from the internal os  Umbilical cord: Cord vessels: 3 vessel cord. Insertion site: central  Amniotic fluid: Amount of AF: normal. MVP 7.7 cm. JAQUELINE 16.5 cm. Q1 3.0 cm, Q2 5.8 cm, Q3 0.0 cm, Q4 7.7 cm    Fetal Anatomy  ===========    4-chamber view: documented previously  Heart / Thorax  Aortic arch view: SUBOPTIMAL  Cardiac rhythm: regular (normal)  Cord insertion: SUBOPTIMAL  Stomach: normal  Kidneys: normal  Bladder: normal  Wants to know fetal sex: no    Biophysical Profile  ==============    0: Fetal breathing

## 2024-07-24 ENCOUNTER — ROUTINE PRENATAL (OUTPATIENT)
Age: 39
End: 2024-07-24
Payer: COMMERCIAL

## 2024-07-24 VITALS — HEART RATE: 67 BPM | SYSTOLIC BLOOD PRESSURE: 112 MMHG | DIASTOLIC BLOOD PRESSURE: 64 MMHG

## 2024-07-24 DIAGNOSIS — O09.299 HISTORY OF PRE-ECLAMPSIA IN PRIOR PREGNANCY, CURRENTLY PREGNANT: ICD-10-CM

## 2024-07-24 DIAGNOSIS — O09.522 ADVANCED MATERNAL AGE IN MULTIGRAVIDA, SECOND TRIMESTER: ICD-10-CM

## 2024-07-24 DIAGNOSIS — O34.219 PREVIOUS CESAREAN DELIVERY, ANTEPARTUM CONDITION OR COMPLICATION: ICD-10-CM

## 2024-07-24 DIAGNOSIS — E66.01 MORBID OBESITY WITH BMI OF 40.0-44.9, ADULT (HCC): Primary | ICD-10-CM

## 2024-07-24 PROCEDURE — 99212 OFFICE O/P EST SF 10 MIN: CPT

## 2024-07-24 PROCEDURE — 99999 PR OFFICE/OUTPT VISIT,PROCEDURE ONLY: CPT | Performed by: OBSTETRICS & GYNECOLOGY

## 2024-07-24 PROCEDURE — 76819 FETAL BIOPHYS PROFIL W/O NST: CPT | Performed by: OBSTETRICS & GYNECOLOGY

## 2024-07-24 NOTE — PROGRESS NOTES
Assessment & Plan   ASSESSMENT/PLAN:  1. Morbid obesity with BMI of 40.0-44.9, adult (HCC)  2. Advanced maternal age in multigravida, second trimester  3. History of pre-eclampsia in prior pregnancy, currently pregnant  4. Previous  delivery, antepartum condition or complication    Virginia is a 37 y/o G2, P1 whose pregnancy is complicated by:     AMA  -LR NIPT(Does not wish to know sex); Declined amniocentesis.  -Reviewed reassuring BPP , normal fluid JAQUELINE 19cm.  -Kick count instructions, PIH and PTL precautions reviewed.     Hx of GHTN/preeclampsia.  -prescribed ASA 81mg qd  -24 CMP & Upro/Cr WNL  -24 and 24 CBC WNL     Morbid obesity  -24: Early 1HR GTT 89 WNL; 24: 1 Hr  WNL     Previous  section  -Performed for FTP in the setting of pre-E.  -Anterior placenta not low-lying  -R C/S scheduled 24    Recommendations  Begin weekly  testing  Interval Growth as scheduled-Next in ~ 3 weeks  R C/S scheduled 24    Please see Viewpoint for ultrasound findings     Subjective   India Franklin (:  1985) is a 39 y.o. female,Established patient, here for evaluation of the following chief complaint(s):  1. Morbid obesity with BMI of 40.0-44.9, adult (HCC)  2. Advanced maternal age in multigravida, second trimester  3. History of pre-eclampsia in prior pregnancy, currently pregnant  4. Previous  delivery, antepartum condition or complication     Objective   Physical Exam  Vitals reviewed.   Constitutional:       Appearance: Normal appearance.   Neurological:      Mental Status: She is alert.   Psychiatric:         Mood and Affect: Mood normal.         Judgment: Judgment normal.        On this date 2024 I have spent 15 minutes reviewing previous notes, test results and face to face with the patient discussing the diagnosis and importance of compliance with the treatment plan as well as documenting on the day of the visit.      An electronic

## 2024-07-24 NOTE — PROCEDURES
PATIENT: ELIOT PICHARDOF   -  : 1985   -  DOS:2024   -  INTERPRETING PROVIDER:Deepak Rosales,   Indication  ========    AMA, BMI 44, previous C/S for FTP, hx of PIH.    Method  ======    Transabdominal ultrasound examination. View: Suboptimal view: limited by maternal body habitus    Pregnancy  =========    Peterson pregnancy. Number of fetuses: 1    Dating  ======    LMP on: 2023  GA by LMP 35 w + 5 d  KRISSY by LMP: 2024  Previous Ultrasound on: 2024  Type of prior assessment: GA  GA at prior assessment date 8 w + 5 d  GA by previous U/S 35 w + 4 d  KRISSY by previous Ultrasound: 2024  Assigned: based on the LMP, selected on 2024  Assigned GA 35 w + 5 d  Assigned KRISSY: 2024    General Evaluation  ==============    Cardiac activity present.  bpm. Fetal movements: visualized. Presentation: BREECH  Placenta: Placental site: anterior, appropriate distance from the internal os  Umbilical cord: Cord vessels: 3 vessel cord    Fetal Anatomy  ===========    Heart / Thorax  Aortic arch view: SUBOPTIMAL  Cardiac rhythm: regular (normal)  Cord insertion: SUBOPTIMAL  Stomach: normal  Kidneys: normal  Bladder: normal  Wants to know fetal sex: no    Amniotic Fluid Assessment  =====================    Amount of AF: normal  MVP 5.9 cm. JAQUELINE 19.0 cm. Q1 5.9 cm, Q2 4.5 cm, Q3 5.1 cm, Q4 3.5 cm    Biophysical Profile  ==============    2: Fetal breathing movements  2: Gross body movements  2: Fetal tone  2: Amniotic fluid volume  8/8 Biophysical profile score    Findings  =======    Intrauterine Peterson pregnancy at 35w 5d by clinical dates.  Amniotic fluid: normal.  Placenta is anterior, appropriate distance from the internal os.  Biophysical profile score is 8/8.  BREECH presentation.    Consultation  ==========    NP Note 24    Virginia is a 39 y/o G2, P1 whose pregnancy is complicated by:    AMA  -LR NIPT(Does not wish to know sex); Declined amniocentesis.  -Reviewed

## 2024-07-30 ENCOUNTER — ROUTINE PRENATAL (OUTPATIENT)
Age: 39
End: 2024-07-30
Payer: COMMERCIAL

## 2024-07-30 VITALS — DIASTOLIC BLOOD PRESSURE: 75 MMHG | HEART RATE: 74 BPM | SYSTOLIC BLOOD PRESSURE: 113 MMHG

## 2024-07-30 DIAGNOSIS — E66.01 MORBID OBESITY WITH BMI OF 40.0-44.9, ADULT (HCC): Primary | ICD-10-CM

## 2024-07-30 PROCEDURE — 76819 FETAL BIOPHYS PROFIL W/O NST: CPT | Performed by: OBSTETRICS & GYNECOLOGY

## 2024-07-30 NOTE — PROCEDURES
PATIENT: ELIOT PICHARDOF   -  : 1985   -  DOS:2024   -  INTERPRETING PROVIDER:Lisbeth Escamilla,   Indication  ========    AMA, BMI 44, previous C/S for FTP, hx of PIH.    Method  ======    Transabdominal ultrasound examination. View: Sufficient    Pregnancy  =========    Peterson pregnancy. Number of fetuses: 1    Dating  ======    LMP on: 2023  GA by LMP 36 w + 4 d  KRISSY by LMP: 2024  Previous Ultrasound on: 2024  Type of prior assessment: GA  GA at prior assessment date 8 w + 5 d  GA by previous U/S 36 w + 3 d  KRISSY by previous Ultrasound: 2024  Assigned: based on the LMP, selected on 2024  Assigned GA 36 w + 4 d  Assigned KRISSY: 2024    General Evaluation  ==============    Cardiac activity present.  bpm. Fetal movements: visualized. Presentation: Cephalic  Placenta: Placental site: anterior, appropriate distance from the internal os  Umbilical cord: Cord vessels: 3 vessel cord    Fetal Anatomy  ===========    Heart / Thorax  Aortic arch view: SUBOPTIMAL  Cardiac rhythm: regular (normal)  Cord insertion: SUBOPTIMAL  Stomach: normal  Kidneys: normal  Bladder: normal  Wants to know fetal sex: no    Amniotic Fluid Assessment  =====================    Amount of AF: normal  MVP 5.1 cm. JAQUELINE 17.7 cm. Q1 4.7 cm, Q2 4.7 cm, Q3 5.1 cm, Q4 3.1 cm    Biophysical Profile  ==============    2: Fetal breathing movements  2: Gross body movements  2: Fetal tone  2: Amniotic fluid volume  8/8 Biophysical profile score    Findings  =======    Intrauterine Peterson pregnancy at 36w 4d by clinical dates.  Amniotic fluid: normal.  Placenta is anterior, appropriate distance from the internal os.  Biophysical profile score is 8/8.  Cephalic presentation.    Consultation  ==========    Virginia is a 39 y/o G2,  whose pregnancy is complicated by:    AMA  -LR NIPT(Does not wish to know sex); Declined amniocentesis.  -Kick count instructions, PIH and PTL precautions reviewed.    Hx

## 2024-08-01 ENCOUNTER — ROUTINE PRENATAL (OUTPATIENT)
Age: 39
End: 2024-08-01

## 2024-08-01 VITALS — BODY MASS INDEX: 46.22 KG/M2 | DIASTOLIC BLOOD PRESSURE: 75 MMHG | SYSTOLIC BLOOD PRESSURE: 126 MMHG | WEIGHT: 293 LBS

## 2024-08-01 DIAGNOSIS — O09.529 SUPERVISION OF ELDERLY MULTIGRAVIDA, ANTEPARTUM: Primary | ICD-10-CM

## 2024-08-01 DIAGNOSIS — Z3A.36 36 WEEKS GESTATION OF PREGNANCY: ICD-10-CM

## 2024-08-01 PROCEDURE — 0502F SUBSEQUENT PRENATAL CARE: CPT | Performed by: OBSTETRICS & GYNECOLOGY

## 2024-08-01 NOTE — PROGRESS NOTES
Baby moving  GBS done  Disc labor precautions    CS with joel tubal ligation scheduled 8/19  Fu 1 weeks

## 2024-08-04 LAB
GP B STREP DNA SPEC QL NAA+PROBE: POSITIVE
SPECIMEN SOURCE: ABNORMAL

## 2024-08-07 ENCOUNTER — ROUTINE PRENATAL (OUTPATIENT)
Age: 39
End: 2024-08-07
Payer: COMMERCIAL

## 2024-08-07 ENCOUNTER — ROUTINE PRENATAL (OUTPATIENT)
Age: 39
End: 2024-08-07

## 2024-08-07 VITALS — SYSTOLIC BLOOD PRESSURE: 103 MMHG | HEART RATE: 73 BPM | DIASTOLIC BLOOD PRESSURE: 69 MMHG

## 2024-08-07 DIAGNOSIS — O34.219 PREVIOUS CESAREAN SECTION COMPLICATING PREGNANCY, ANTEPARTUM CONDITION OR COMPLICATION: ICD-10-CM

## 2024-08-07 DIAGNOSIS — E66.01 MORBID OBESITY WITH BMI OF 40.0-44.9, ADULT (HCC): Primary | ICD-10-CM

## 2024-08-07 DIAGNOSIS — O09.523 AMA (ADVANCED MATERNAL AGE) MULTIGRAVIDA 35+, THIRD TRIMESTER: Primary | ICD-10-CM

## 2024-08-07 DIAGNOSIS — O34.219 PREVIOUS CESAREAN DELIVERY, ANTEPARTUM CONDITION OR COMPLICATION: ICD-10-CM

## 2024-08-07 DIAGNOSIS — E66.01 MORBID OBESITY WITH BMI OF 40.0-44.9, ADULT (HCC): ICD-10-CM

## 2024-08-07 DIAGNOSIS — Z87.59 HISTORY OF GESTATIONAL HYPERTENSION: ICD-10-CM

## 2024-08-07 DIAGNOSIS — O09.522 ADVANCED MATERNAL AGE IN MULTIGRAVIDA, SECOND TRIMESTER: ICD-10-CM

## 2024-08-07 DIAGNOSIS — O09.299 HISTORY OF PRE-ECLAMPSIA IN PRIOR PREGNANCY, CURRENTLY PREGNANT: ICD-10-CM

## 2024-08-07 LAB
CLINDAMYCIN ISLT KB: ABNORMAL
GP B STREP DNA SPEC QL NAA+PROBE: POSITIVE
ORGANISM: ABNORMAL
SPECIMEN SOURCE: ABNORMAL

## 2024-08-07 PROCEDURE — 76818 FETAL BIOPHYS PROFILE W/NST: CPT | Performed by: OBSTETRICS & GYNECOLOGY

## 2024-08-07 NOTE — PROGRESS NOTES
Assessment & Plan   ASSESSMENT/PLAN:  1. AMA (advanced maternal age) multigravida 35+, third trimester  2. History of pre-eclampsia in prior pregnancy, currently pregnant  3. History of gestational hypertension  4. Morbid obesity with BMI of 40.0-44.9, adult (HCC)  5. Previous  section complicating pregnancy, antepartum condition or complication    Virginia is a 39 y/o G2,  whose pregnancy is complicated by:     Reassuring BPP/NST 8/10 (-2 breathing)     AMA  -LR NIPT(Does not wish to know sex); Declined amniocentesis.  -Kick count instructions and PTL precautions reviewed.     Hx of GHTN/preeclampsia.  -prescribed ASA 81mg qd  -24 CMP & Upro/Cr WNL  -24 and 24 CBC WNL  -Denies headaches, visual changes, or right upper quadrant or epigastric pain or SOB.   -BP today 103/69  -PIH precautions reviewed.      Morbid obesity  -24: Early 1HR GTT 89 WNL; 24: 1 Hr  WNL     Previous  section  -Performed for FTP in the setting of pre-E.  -Anterior placenta not low-lying  -R C/S scheduled 24    Recommendations  Continue weekly  testing  Interval Growth as scheduled-Next in ~ 1 week  R C/S scheduled 24    Please see Viewpoint for ultrasound findings.    Subjective   India Franklin (:  1985) is a 39 y.o. female,Established patient, here for evaluation of the following chief complaint(s):  1. AMA (advanced maternal age) multigravida 35+, third trimester  2. History of pre-eclampsia in prior pregnancy, currently pregnant  3. History of gestational hypertension  4. Morbid obesity with BMI of 40.0-44.9, adult (HCC)  5. Previous  section complicating pregnancy, antepartum condition or complication      Objective   Physical Exam  Vitals reviewed.   Constitutional:       Appearance: Normal appearance.   Neurological:      Mental Status: She is alert.   Psychiatric:         Mood and Affect: Mood normal.         Judgment: Judgment normal.       On

## 2024-08-07 NOTE — PROCEDURES
PATIENT: SHYANNE PICHARDO   -  : 1985   -  DOS:2024   -  INTERPRETING PROVIDER:Bill Elliott,   Indication  ========    AMA, BMI 44, previous C/S for FTP, hx of PIH.    Method  ======    Transabdominal ultrasound examination. View: Satisfactory    Pregnancy  =========    Peterson pregnancy. Number of fetuses: 1    Dating  ======    LMP on: 2023  GA by LMP 37 w + 5 d  KRISSY by LMP: 2024  Previous Ultrasound on: 2024  Type of prior assessment: GA  GA at prior assessment date 8 w + 5 d  GA by previous U/S 37 w + 4 d  KRISSY by previous Ultrasound: 2024  Assigned: based on the LMP, selected on 2024  Assigned GA 37 w + 5 d  Assigned KRISSY: 2024    General Evaluation  ==============    Cardiac activity present.  bpm. Fetal movements: visualized. Presentation: Cephalic  Placenta: Placental site: anterior, no previa prior  Umbilical cord: Cord vessels: 3 vessel cord    Fetal Anatomy  ===========    Heart / Thorax  Aortic arch view: normal  Cardiac rhythm: regular (normal)  Cord insertion: SUBOPTIMAL  Stomach: normal  Kidneys: normal  Bladder: normal  Wants to know fetal sex: no    Amniotic Fluid Assessment  =====================    Amount of AF: normal  MVP 8.0 cm. JAQUELINE 20.1 cm. Q1 3.7 cm, Q2 8.0 cm, Q3 1.9 cm, Q4 6.6 cm    Biophysical Profile  ==============    0: Fetal breathing movements  2: Gross body movements  2: Fetal tone  2: Amniotic fluid volume  NST: reactive  8/10 Biophysical profile score    Non Stress Test  =============    NST interpretation: reactive. Test duration 20 min. Baseline  bpm. Accelerations: present. Decelerations: absent. Uterine activity: present, mild ctx x 2, 60-70 sec.  Hydration instructions reviewed. Acoustic stimulation: no    Findings  =======    Intrauterine Peterson pregnancy at 37w 5d by clinical dates.  Amniotic fluid is normal.  Placenta is anterior, no previa prior.  Biophysical profile score is 8/10.  Cephalic

## 2024-08-08 NOTE — PATIENT INSTRUCTIONS
Patient Education        Week 37 of Your Pregnancy: Care Instructions    Most babies are born between 37 and 40 weeks.   This is a good time to pack a bag to take with you to the birth. Then it will be ready to go when you are.     Learn about breastfeeding.  For example, find out about ways to hold your baby to make breastfeeding easier. And think about learning how to pump and store milk.     Know that crying is normal.  It's common for babies to cry 1 to 3 hours a day. Some cry more, and some cry less.     Learn why babies cry.  They may be hungry; have gas; need a diaper change; or feel cold, warm, tired, lonely, or tense. Sometimes they cry for unknown reasons.     Think about what will help you stay calm when your baby cries.  Taking slow, deep breaths can help. So can taking a break. It's okay to put your baby somewhere safe (like their crib) and walk away for a few minutes.     Learn about safe sleep for your baby.  Always put your baby to sleep on their back. Place them alone in a crib or bassinet with a firm, flat surface. Keep soft items like stuffed animals out of the crib.     Learn what to expect with  poop.  Your baby will have their own bowel patterns. Some babies have several bowel movements a day. Some have fewer.     Know that  babies will often have loose, yellow bowel movements.  Formula-fed babies have more formed stools. If your baby's poop looks like pellets, your baby is constipated.   Follow-up care is a key part of your treatment and safety. Be sure to make and go to all appointments, and call your doctor if you are having problems. It's also a good idea to know your test results and keep a list of the medicines you take.  Where can you learn more?  Go to https://[email or web address].net/patientEd and enter N257 to learn more about \"Week 37 of Your Pregnancy: Care Instructions.\"  Current as of: July 10, 2023  Content Version: 14.1  © 5899-3453 [employer].   Care

## 2024-08-08 NOTE — PROGRESS NOTES
Flu vaccine 1/18  Intrauterine pregnancy with the following problems identified:  EDC 8/23/24 D=US  AMA 37 at delivery  NIPTS normal male   Horizon negative (2019)  Femara pregnancy  Hx of LTCS documented fetal intolerance to labor - repeat CS with bilateral salpingectomy  Hx of GHTN - LDA 12 weeks  Flu vaccine 1/24  Rh negative  Baseline PCR 0.0   AFP- neg  Early GTT normal  Repeat C/S bilateral salpingectomy 8/19/24   1hr GTT- normal  GBS pos - clind resistant - Ancef okay

## 2024-08-09 ENCOUNTER — ROUTINE PRENATAL (OUTPATIENT)
Age: 39
End: 2024-08-09

## 2024-08-09 VITALS — DIASTOLIC BLOOD PRESSURE: 75 MMHG | BODY MASS INDEX: 46.37 KG/M2 | WEIGHT: 293 LBS | SYSTOLIC BLOOD PRESSURE: 126 MMHG

## 2024-08-09 DIAGNOSIS — E66.01 OBESITY, MORBID (HCC): ICD-10-CM

## 2024-08-09 DIAGNOSIS — O09.529 SUPERVISION OF ELDERLY MULTIGRAVIDA, ANTEPARTUM: Primary | ICD-10-CM

## 2024-08-09 DIAGNOSIS — Z3A.38 38 WEEKS GESTATION OF PREGNANCY: ICD-10-CM

## 2024-08-09 NOTE — PROGRESS NOTES
Baby moving  Protein neg, repeat BP completely normal - hx of preeclampsia with G1  Will see Dr. Tatum next week  CS with joel salpingectomy 8/19 scheduled

## 2024-08-12 ENCOUNTER — ROUTINE PRENATAL (OUTPATIENT)
Age: 39
End: 2024-08-12
Payer: COMMERCIAL

## 2024-08-12 VITALS — HEART RATE: 90 BPM | SYSTOLIC BLOOD PRESSURE: 106 MMHG | DIASTOLIC BLOOD PRESSURE: 71 MMHG

## 2024-08-12 DIAGNOSIS — Z87.59 HISTORY OF GESTATIONAL HYPERTENSION: ICD-10-CM

## 2024-08-12 DIAGNOSIS — O34.219 PREVIOUS CESAREAN SECTION COMPLICATING PREGNANCY, ANTEPARTUM CONDITION OR COMPLICATION: ICD-10-CM

## 2024-08-12 DIAGNOSIS — O09.523 AMA (ADVANCED MATERNAL AGE) MULTIGRAVIDA 35+, THIRD TRIMESTER: Primary | ICD-10-CM

## 2024-08-12 DIAGNOSIS — O09.299 HISTORY OF PRE-ECLAMPSIA IN PRIOR PREGNANCY, CURRENTLY PREGNANT: ICD-10-CM

## 2024-08-12 DIAGNOSIS — E66.01 MORBID OBESITY WITH BMI OF 40.0-44.9, ADULT (HCC): ICD-10-CM

## 2024-08-12 PROCEDURE — 76816 OB US FOLLOW-UP PER FETUS: CPT | Performed by: OBSTETRICS & GYNECOLOGY

## 2024-08-12 PROCEDURE — 76819 FETAL BIOPHYS PROFIL W/O NST: CPT | Performed by: OBSTETRICS & GYNECOLOGY

## 2024-08-12 PROCEDURE — 99212 OFFICE O/P EST SF 10 MIN: CPT

## 2024-08-12 NOTE — PROCEDURES
PATIENT: SHYANNE PICHARDO   -  : 1985   -  DOS:2024   -  INTERPRETING PROVIDER:Bill Elliott,   Indication  ========    AMA, BMI 44, previous C/S for FTP, hx of PIH.    Method  ======    Transabdominal ultrasound examination. View: Suboptimal view: limited by late gestational age    Pregnancy  =========    Peterson pregnancy. Number of fetuses: 1    Dating  ======    LMP on: 2023  GA by LMP 38 w + 3 d  KRISSY by LMP: 2024  Previous Ultrasound on: 2024  Type of prior assessment: GA  GA at prior assessment date 8 w + 5 d  GA by previous U/S 38 w + 2 d  KRISSY by previous Ultrasound: 2024  Ultrasound examination on: 2024  GA by U/S based upon: AC, BPD, Femur, HC  GA by U/S 38 w + 5 d  KRISSY by U/S: 2024  Assigned: based on the LMP, selected on 2024  Assigned GA 38 w + 3 d  Assigned KRISSY: 2024    Fetal Biometry  ============    Standard  BPD 95.2 mm 38w 6d 85% Hadlock  .2 mm -/- >99% Yelena  .4 mm 40w 6d 85% Hadlock  .1 mm 39w 0d 83% Hadlock  Femur 70.6 mm 36w 1d 8% Hadlock  EFW 3,532 g 39w 4d 68% Hadlock  EFW (lb) 7 lb  EFW (oz) 13 oz  EFW by: Hadlock (BPD-HC-AC-FL)  Other Structures   bpm    General Evaluation  ==============    Cardiac activity present.  bpm. Fetal movements: visualized. Presentation: Cephalic  Placenta: Placental site: anterior, no previa prior  Umbilical cord: Cord vessels: 3 vessel cord  Amniotic fluid: Amount of AF: normal. MVP 5.7 cm. JAQUELINE 11.5 cm. Q1 0.0 cm, Q2 5.7 cm, Q3 2.5 cm, Q4 3.3 cm    Fetal Anatomy  ===========    Heart / Thorax  Aortic arch view: normal  Cardiac rhythm: regular (normal)  Cord insertion: SUBOPTIMAL  Stomach: normal  Kidneys: normal  Bladder: normal  Wants to know fetal sex: no    Biophysical Profile  ==============    2: Fetal breathing movements  2: Gross body movements  2: Fetal tone  2: Amniotic fluid volume  8/8 Biophysical profile score    Findings  =======    Intrauterine Peterson

## 2024-08-12 NOTE — PROGRESS NOTES
Assessment & Plan   ASSESSMENT/PLAN:  1. AMA (advanced maternal age) multigravida 35+, third trimester  2. History of gestational hypertension  3. History of pre-eclampsia in prior pregnancy, currently pregnant  4. Previous  section complicating pregnancy, antepartum condition or complication    [place] is a 38 y/o G2,  whose pregnancy is complicated by:     BPP:  reassuring     AGA fetus for established dates.  Interval growth is noted.  Please note that sonographic estimation of fetal/birth weight is not an exact science and clinical correlation is advised.  No demonstrable abnormalities for gestational age of assessment and technical constraints of the examination.     AMA  -LR NIPT(Does not wish to know sex); Declined amniocentesis.  -Kick count instructions and PTL precautions reviewed.     Hx of GHTN/preeclampsia.  -Prescribed ASA 81mg qd  -24 CMP & Upro/Cr WNL  -24 and 24 CBC WNL  -Denies headaches, visual changes, or right upper quadrant or epigastric pain or SOB.   -BP today 106/71  -PIH precautions reviewed.      Morbid obesity  -24: Early 1HR GTT 89 WNL; 24: 1 Hr  WNL     Previous  section  -Performed for FTP in the setting of pre-E.  -Anterior placenta not low-lying  -R C/S scheduled 24    Recommendations  Follow up as clinically indicated. R C/S scheduled 24.     Please see Viewpoint for ultrasound findings.     Subjective   India Franklin (:  1985) is a 39 y.o. female,Established patient, here for evaluation of the following chief complaint(s):  1. AMA (advanced maternal age) multigravida 35+, third trimester  2. History of gestational hypertension  3. History of pre-eclampsia in prior pregnancy, currently pregnant  4. Previous  section complicating pregnancy, antepartum condition or complication    Objective   Physical Exam  Vitals reviewed.   Constitutional:       Appearance: Normal appearance.   Neurological:

## 2024-08-16 ENCOUNTER — ROUTINE PRENATAL (OUTPATIENT)
Age: 39
End: 2024-08-16

## 2024-08-16 VITALS
DIASTOLIC BLOOD PRESSURE: 74 MMHG | WEIGHT: 293 LBS | SYSTOLIC BLOOD PRESSURE: 122 MMHG | BODY MASS INDEX: 46.52 KG/M2 | HEART RATE: 81 BPM

## 2024-08-16 DIAGNOSIS — O09.529 SUPERVISION OF ELDERLY MULTIGRAVIDA, ANTEPARTUM: Primary | ICD-10-CM

## 2024-08-16 PROCEDURE — 0502F SUBSEQUENT PRENATAL CARE: CPT | Performed by: STUDENT IN AN ORGANIZED HEALTH CARE EDUCATION/TRAINING PROGRAM

## 2024-08-16 NOTE — PROGRESS NOTES
38yo  at 39w0d here for SCARLET. TH patient. Some R ear fullness, no pain or fever. BP wnl. Good fetal movement. No contractions, no cervical check today. rCS and BS . Given precautions.

## 2024-08-17 ENCOUNTER — OFFICE VISIT (OUTPATIENT)
Age: 39
End: 2024-08-17

## 2024-08-17 VITALS
WEIGHT: 293 LBS | DIASTOLIC BLOOD PRESSURE: 79 MMHG | OXYGEN SATURATION: 96 % | HEART RATE: 78 BPM | SYSTOLIC BLOOD PRESSURE: 119 MMHG | BODY MASS INDEX: 46.9 KG/M2 | TEMPERATURE: 98.2 F

## 2024-08-17 DIAGNOSIS — H92.01 OTALGIA OF RIGHT EAR: ICD-10-CM

## 2024-08-17 DIAGNOSIS — H66.90 ACUTE OTITIS MEDIA, UNSPECIFIED OTITIS MEDIA TYPE: Primary | ICD-10-CM

## 2024-08-17 RX ORDER — AZITHROMYCIN 250 MG/1
TABLET, FILM COATED ORAL
Qty: 6 TABLET | Refills: 0 | Status: SHIPPED | OUTPATIENT
Start: 2024-08-17 | End: 2024-08-27

## 2024-08-17 NOTE — PROGRESS NOTES
India Franklin (:  1985) is a 39 y.o. female,New patient, here for evaluation of the following chief complaint(s):  Otalgia (Possible ear infection, clogged right ear some pain since yesterday, pt. Is 38 weeks pregnant)       ASSESSMENT/PLAN:  1. Acute otitis media, unspecified otitis media type  2. Otalgia of right ear  -     azithromycin (ZITHROMAX) 250 MG tablet; 500mg on day 1 followed by 250mg on days 2 - 5, Disp-6 tablet, R-0Normal      Please follow up with OB GYN to ask if it is ok to take the medication for an ear infection.     Call or return to clinic if no improvement or any worsening in 2-3 days.    Patient comfortable with plan         SUBJECTIVE/OBJECTIVE:    History provided by:  Patient   used: No          39 y.o. female presents with symptoms of otalgia of the right ear that she woke up with today. She has a scheduled C section on Monday and has concern for treatment with antibiotics at this time. Will call in antibiotics and she can follow up with OB GYN to ensure this is ok to take prior to scheduled C section.              Physical Exam  Constitutional:       Appearance: Normal appearance. She is normal weight.   HENT:      Head: Normocephalic and atraumatic.      Right Ear: Ear canal and external ear normal. There is no impacted cerumen.      Left Ear: Tympanic membrane, ear canal and external ear normal. There is no impacted cerumen.      Nose: Nose normal.      Mouth/Throat:      Mouth: Mucous membranes are moist.   Eyes:      Extraocular Movements: Extraocular movements intact.      Conjunctiva/sclera: Conjunctivae normal.      Pupils: Pupils are equal, round, and reactive to light.   Skin:     General: Skin is warm and dry.   Neurological:      General: No focal deficit present.      Mental Status: She is alert and oriented to person, place, and time.   Psychiatric:         Mood and Affect: Mood normal.         Thought Content: Thought content normal.

## 2024-08-19 ENCOUNTER — APPOINTMENT (OUTPATIENT)
Facility: HOSPITAL | Age: 39
End: 2024-08-19
Payer: COMMERCIAL

## 2024-08-19 ENCOUNTER — ANESTHESIA EVENT (OUTPATIENT)
Facility: HOSPITAL | Age: 39
End: 2024-08-19
Payer: COMMERCIAL

## 2024-08-19 ENCOUNTER — ANESTHESIA (OUTPATIENT)
Facility: HOSPITAL | Age: 39
End: 2024-08-19
Payer: COMMERCIAL

## 2024-08-19 ENCOUNTER — HOSPITAL ENCOUNTER (INPATIENT)
Facility: HOSPITAL | Age: 39
LOS: 2 days | Discharge: HOME | End: 2024-08-21
Attending: OBSTETRICS & GYNECOLOGY | Admitting: OBSTETRICS & GYNECOLOGY
Payer: COMMERCIAL

## 2024-08-19 DIAGNOSIS — G89.18 POST-OP PAIN: Primary | ICD-10-CM

## 2024-08-19 PROBLEM — Z98.891 PREVIOUS CESAREAN SECTION: Status: ACTIVE | Noted: 2024-08-19

## 2024-08-19 LAB
ABO + RH BLD: NORMAL
ALBUMIN SERPL-MCNC: 2.2 G/DL (ref 3.5–5)
ALBUMIN/GLOB SERPL: 0.7 (ref 1.1–2.2)
ALP SERPL-CCNC: 150 U/L (ref 45–117)
ALT SERPL-CCNC: 13 U/L (ref 12–78)
ANION GAP SERPL CALC-SCNC: 8 MMOL/L (ref 5–15)
APTT PPP: 23.9 SEC (ref 22.1–31)
AST SERPL-CCNC: 14 U/L (ref 15–37)
BASOPHILS # BLD: 0 K/UL (ref 0–0.1)
BASOPHILS NFR BLD: 0 % (ref 0–1)
BILIRUB SERPL-MCNC: 0.2 MG/DL (ref 0.2–1)
BLOOD GROUP ANTIBODIES SERPL: NORMAL
BUN SERPL-MCNC: 7 MG/DL (ref 6–20)
BUN/CREAT SERPL: 11 (ref 12–20)
CALCIUM SERPL-MCNC: 7.9 MG/DL (ref 8.5–10.1)
CHLORIDE SERPL-SCNC: 109 MMOL/L (ref 97–108)
CO2 SERPL-SCNC: 22 MMOL/L (ref 21–32)
CREAT SERPL-MCNC: 0.61 MG/DL (ref 0.55–1.02)
DIFFERENTIAL METHOD BLD: ABNORMAL
EKG ATRIAL RATE: 60 BPM
EKG DIAGNOSIS: NORMAL
EKG P AXIS: 75 DEGREES
EKG P-R INTERVAL: 172 MS
EKG Q-T INTERVAL: 426 MS
EKG QRS DURATION: 96 MS
EKG QTC CALCULATION (BAZETT): 426 MS
EKG R AXIS: 13 DEGREES
EKG T AXIS: 29 DEGREES
EKG VENTRICULAR RATE: 60 BPM
EOSINOPHIL # BLD: 0 K/UL (ref 0–0.4)
EOSINOPHIL NFR BLD: 0 % (ref 0–7)
ERYTHROCYTE [DISTWIDTH] IN BLOOD BY AUTOMATED COUNT: 13.8 % (ref 11.5–14.5)
ERYTHROCYTE [DISTWIDTH] IN BLOOD BY AUTOMATED COUNT: 14 % (ref 11.5–14.5)
FIBRINOGEN PPP-MCNC: 316 MG/DL (ref 200–475)
GLOBULIN SER CALC-MCNC: 3.1 G/DL (ref 2–4)
GLUCOSE BLD STRIP.AUTO-MCNC: 100 MG/DL (ref 65–117)
GLUCOSE SERPL-MCNC: 94 MG/DL (ref 65–100)
HCT VFR BLD AUTO: 30.5 % (ref 35–47)
HCT VFR BLD AUTO: 32.1 % (ref 35–47)
HGB BLD-MCNC: 10.3 G/DL (ref 11.5–16)
HGB BLD-MCNC: 11.1 G/DL (ref 11.5–16)
IMM GRANULOCYTES # BLD AUTO: 0.1 K/UL (ref 0–0.04)
IMM GRANULOCYTES NFR BLD AUTO: 1 % (ref 0–0.5)
INR PPP: 1 (ref 0.9–1.1)
LYMPHOCYTES # BLD: 1.1 K/UL (ref 0.8–3.5)
LYMPHOCYTES NFR BLD: 12 % (ref 12–49)
MCH RBC QN AUTO: 29.9 PG (ref 26–34)
MCH RBC QN AUTO: 29.9 PG (ref 26–34)
MCHC RBC AUTO-ENTMCNC: 33.8 G/DL (ref 30–36.5)
MCHC RBC AUTO-ENTMCNC: 34.6 G/DL (ref 30–36.5)
MCV RBC AUTO: 86.5 FL (ref 80–99)
MCV RBC AUTO: 88.7 FL (ref 80–99)
MONOCYTES # BLD: 0.5 K/UL (ref 0–1)
MONOCYTES NFR BLD: 5 % (ref 5–13)
NEUTS SEG # BLD: 7.5 K/UL (ref 1.8–8)
NEUTS SEG NFR BLD: 82 % (ref 32–75)
NRBC # BLD: 0 K/UL (ref 0–0.01)
NRBC # BLD: 0 K/UL (ref 0–0.01)
NRBC BLD-RTO: 0 PER 100 WBC
NRBC BLD-RTO: 0 PER 100 WBC
PLATELET # BLD AUTO: 181 K/UL (ref 150–400)
PLATELET # BLD AUTO: 190 K/UL (ref 150–400)
PMV BLD AUTO: 10.2 FL (ref 8.9–12.9)
PMV BLD AUTO: 9.5 FL (ref 8.9–12.9)
POTASSIUM SERPL-SCNC: 4.2 MMOL/L (ref 3.5–5.1)
PROT SERPL-MCNC: 5.3 G/DL (ref 6.4–8.2)
PROTHROMBIN TIME: 10.2 SEC (ref 9–11.1)
RBC # BLD AUTO: 3.44 M/UL (ref 3.8–5.2)
RBC # BLD AUTO: 3.71 M/UL (ref 3.8–5.2)
SARS-COV-2 RNA RESP QL NAA+PROBE: NOT DETECTED
SERVICE CMNT-IMP: NORMAL
SODIUM SERPL-SCNC: 139 MMOL/L (ref 136–145)
SOURCE: NORMAL
SPECIMEN EXP DATE BLD: NORMAL
THERAPEUTIC RANGE: NORMAL SECS (ref 58–77)
WBC # BLD AUTO: 8.5 K/UL (ref 3.6–11)
WBC # BLD AUTO: 9.1 K/UL (ref 3.6–11)

## 2024-08-19 PROCEDURE — 51702 INSERT TEMP BLADDER CATH: CPT

## 2024-08-19 PROCEDURE — 85610 PROTHROMBIN TIME: CPT

## 2024-08-19 PROCEDURE — 3609079900 HC CESAREAN SECTION: Performed by: OBSTETRICS & GYNECOLOGY

## 2024-08-19 PROCEDURE — 36415 COLL VENOUS BLD VENIPUNCTURE: CPT

## 2024-08-19 PROCEDURE — 85025 COMPLETE CBC W/AUTO DIFF WBC: CPT

## 2024-08-19 PROCEDURE — 94761 N-INVAS EAR/PLS OXIMETRY MLT: CPT

## 2024-08-19 PROCEDURE — 88305 TISSUE EXAM BY PATHOLOGIST: CPT

## 2024-08-19 PROCEDURE — 2580000003 HC RX 258: Performed by: OBSTETRICS & GYNECOLOGY

## 2024-08-19 PROCEDURE — 82962 GLUCOSE BLOOD TEST: CPT

## 2024-08-19 PROCEDURE — 6360000002 HC RX W HCPCS: Performed by: OBSTETRICS & GYNECOLOGY

## 2024-08-19 PROCEDURE — 2720000010 HC SURG SUPPLY STERILE: Performed by: OBSTETRICS & GYNECOLOGY

## 2024-08-19 PROCEDURE — 86780 TREPONEMA PALLIDUM: CPT

## 2024-08-19 PROCEDURE — 86900 BLOOD TYPING SEROLOGIC ABO: CPT

## 2024-08-19 PROCEDURE — 6360000002 HC RX W HCPCS: Performed by: ANESTHESIOLOGY

## 2024-08-19 PROCEDURE — 59510 CESAREAN DELIVERY: CPT | Performed by: OBSTETRICS & GYNECOLOGY

## 2024-08-19 PROCEDURE — 85384 FIBRINOGEN ACTIVITY: CPT

## 2024-08-19 PROCEDURE — 85730 THROMBOPLASTIN TIME PARTIAL: CPT

## 2024-08-19 PROCEDURE — 86850 RBC ANTIBODY SCREEN: CPT

## 2024-08-19 PROCEDURE — 93005 ELECTROCARDIOGRAM TRACING: CPT | Performed by: OBSTETRICS & GYNECOLOGY

## 2024-08-19 PROCEDURE — 80053 COMPREHEN METABOLIC PANEL: CPT

## 2024-08-19 PROCEDURE — 6360000004 HC RX CONTRAST MEDICATION: Performed by: OBSTETRICS & GYNECOLOGY

## 2024-08-19 PROCEDURE — 2709999900 HC NON-CHARGEABLE SUPPLY: Performed by: OBSTETRICS & GYNECOLOGY

## 2024-08-19 PROCEDURE — 7100000001 HC PACU RECOVERY - ADDTL 15 MIN: Performed by: OBSTETRICS & GYNECOLOGY

## 2024-08-19 PROCEDURE — 58700 REMOVAL OF FALLOPIAN TUBE: CPT | Performed by: OBSTETRICS & GYNECOLOGY

## 2024-08-19 PROCEDURE — 0UB70ZZ EXCISION OF BILATERAL FALLOPIAN TUBES, OPEN APPROACH: ICD-10-PCS | Performed by: OBSTETRICS & GYNECOLOGY

## 2024-08-19 PROCEDURE — 2580000003 HC RX 258: Performed by: NURSE ANESTHETIST, CERTIFIED REGISTERED

## 2024-08-19 PROCEDURE — 6370000000 HC RX 637 (ALT 250 FOR IP): Performed by: OBSTETRICS & GYNECOLOGY

## 2024-08-19 PROCEDURE — 1120000000 HC RM PRIVATE OB

## 2024-08-19 PROCEDURE — 86901 BLOOD TYPING SEROLOGIC RH(D): CPT

## 2024-08-19 PROCEDURE — 3700000000 HC ANESTHESIA ATTENDED CARE: Performed by: OBSTETRICS & GYNECOLOGY

## 2024-08-19 PROCEDURE — 7100000000 HC PACU RECOVERY - FIRST 15 MIN: Performed by: OBSTETRICS & GYNECOLOGY

## 2024-08-19 PROCEDURE — 2500000003 HC RX 250 WO HCPCS: Performed by: NURSE ANESTHETIST, CERTIFIED REGISTERED

## 2024-08-19 PROCEDURE — 3700000001 HC ADD 15 MINUTES (ANESTHESIA): Performed by: OBSTETRICS & GYNECOLOGY

## 2024-08-19 PROCEDURE — 4A1HXCZ MONITORING OF PRODUCTS OF CONCEPTION, CARDIAC RATE, EXTERNAL APPROACH: ICD-10-PCS | Performed by: OBSTETRICS & GYNECOLOGY

## 2024-08-19 PROCEDURE — 6360000002 HC RX W HCPCS: Performed by: NURSE ANESTHETIST, CERTIFIED REGISTERED

## 2024-08-19 PROCEDURE — 71275 CT ANGIOGRAPHY CHEST: CPT

## 2024-08-19 PROCEDURE — 93010 ELECTROCARDIOGRAM REPORT: CPT | Performed by: SPECIALIST

## 2024-08-19 PROCEDURE — 85027 COMPLETE CBC AUTOMATED: CPT

## 2024-08-19 PROCEDURE — 87635 SARS-COV-2 COVID-19 AMP PRB: CPT

## 2024-08-19 RX ORDER — PHENYLEPHRINE HCL IN 0.9% NACL 0.4MG/10ML
SYRINGE (ML) INTRAVENOUS PRN
Status: DISCONTINUED | OUTPATIENT
Start: 2024-08-19 | End: 2024-08-19 | Stop reason: SDUPTHER

## 2024-08-19 RX ORDER — EPHEDRINE SULFATE/0.9% NACL/PF 50 MG/5 ML
SYRINGE (ML) INTRAVENOUS PRN
Status: DISCONTINUED | OUTPATIENT
Start: 2024-08-19 | End: 2024-08-19 | Stop reason: SDUPTHER

## 2024-08-19 RX ORDER — AZITHROMYCIN 250 MG/1
250 TABLET, FILM COATED ORAL DAILY
Status: DISCONTINUED | OUTPATIENT
Start: 2024-08-19 | End: 2024-08-21 | Stop reason: HOSPADM

## 2024-08-19 RX ORDER — SODIUM CHLORIDE, SODIUM LACTATE, POTASSIUM CHLORIDE, CALCIUM CHLORIDE 600; 310; 30; 20 MG/100ML; MG/100ML; MG/100ML; MG/100ML
INJECTION, SOLUTION INTRAVENOUS CONTINUOUS
Status: DISCONTINUED | OUTPATIENT
Start: 2024-08-19 | End: 2024-08-21 | Stop reason: HOSPADM

## 2024-08-19 RX ORDER — ONDANSETRON 2 MG/ML
4 INJECTION INTRAMUSCULAR; INTRAVENOUS EVERY 6 HOURS PRN
Status: DISCONTINUED | OUTPATIENT
Start: 2024-08-19 | End: 2024-08-19 | Stop reason: HOSPADM

## 2024-08-19 RX ORDER — ONDANSETRON 4 MG/1
4 TABLET, ORALLY DISINTEGRATING ORAL EVERY 8 HOURS PRN
Status: DISCONTINUED | OUTPATIENT
Start: 2024-08-19 | End: 2024-08-21 | Stop reason: HOSPADM

## 2024-08-19 RX ORDER — ONDANSETRON 2 MG/ML
INJECTION INTRAMUSCULAR; INTRAVENOUS PRN
Status: DISCONTINUED | OUTPATIENT
Start: 2024-08-19 | End: 2024-08-19 | Stop reason: SDUPTHER

## 2024-08-19 RX ORDER — SODIUM CHLORIDE 9 MG/ML
INJECTION, SOLUTION INTRAVENOUS PRN
Status: DISCONTINUED | OUTPATIENT
Start: 2024-08-19 | End: 2024-08-21 | Stop reason: HOSPADM

## 2024-08-19 RX ORDER — ACETAMINOPHEN 500 MG
1000 TABLET ORAL EVERY 8 HOURS
Status: DISCONTINUED | OUTPATIENT
Start: 2024-08-19 | End: 2024-08-21 | Stop reason: HOSPADM

## 2024-08-19 RX ORDER — LANOLIN/MINERAL OIL
LOTION (ML) TOPICAL
Status: DISCONTINUED | OUTPATIENT
Start: 2024-08-19 | End: 2024-08-21 | Stop reason: HOSPADM

## 2024-08-19 RX ORDER — SWAB
1 SWAB, NON-MEDICATED MISCELLANEOUS DAILY
Status: DISCONTINUED | OUTPATIENT
Start: 2024-08-19 | End: 2024-08-21 | Stop reason: HOSPADM

## 2024-08-19 RX ORDER — METHYLERGONOVINE MALEATE 0.2 MG/ML
200 INJECTION INTRAVENOUS PRN
Status: DISCONTINUED | OUTPATIENT
Start: 2024-08-19 | End: 2024-08-21 | Stop reason: HOSPADM

## 2024-08-19 RX ORDER — FENTANYL CITRATE 50 UG/ML
INJECTION, SOLUTION INTRAMUSCULAR; INTRAVENOUS
Status: COMPLETED | OUTPATIENT
Start: 2024-08-19 | End: 2024-08-19

## 2024-08-19 RX ORDER — TRANEXAMIC ACID 10 MG/ML
1000 INJECTION, SOLUTION INTRAVENOUS
Status: ACTIVE | OUTPATIENT
Start: 2024-08-19 | End: 2024-08-20

## 2024-08-19 RX ORDER — SODIUM CHLORIDE 0.9 % (FLUSH) 0.9 %
5-40 SYRINGE (ML) INJECTION EVERY 12 HOURS SCHEDULED
Status: DISCONTINUED | OUTPATIENT
Start: 2024-08-19 | End: 2024-08-21 | Stop reason: HOSPADM

## 2024-08-19 RX ORDER — MISOPROSTOL 200 UG/1
800 TABLET ORAL PRN
Status: DISCONTINUED | OUTPATIENT
Start: 2024-08-19 | End: 2024-08-21 | Stop reason: HOSPADM

## 2024-08-19 RX ORDER — SENNA AND DOCUSATE SODIUM 50; 8.6 MG/1; MG/1
1 TABLET, FILM COATED ORAL DAILY
Status: DISCONTINUED | OUTPATIENT
Start: 2024-08-19 | End: 2024-08-21 | Stop reason: HOSPADM

## 2024-08-19 RX ORDER — FAMOTIDINE 20 MG/1
20 TABLET, FILM COATED ORAL 2 TIMES DAILY PRN
Status: DISCONTINUED | OUTPATIENT
Start: 2024-08-19 | End: 2024-08-21 | Stop reason: HOSPADM

## 2024-08-19 RX ORDER — NALOXONE HYDROCHLORIDE 0.4 MG/ML
INJECTION, SOLUTION INTRAMUSCULAR; INTRAVENOUS; SUBCUTANEOUS PRN
Status: DISCONTINUED | OUTPATIENT
Start: 2024-08-19 | End: 2024-08-19 | Stop reason: HOSPADM

## 2024-08-19 RX ORDER — ONDANSETRON 2 MG/ML
4 INJECTION INTRAMUSCULAR; INTRAVENOUS EVERY 6 HOURS PRN
Status: DISCONTINUED | OUTPATIENT
Start: 2024-08-19 | End: 2024-08-21 | Stop reason: HOSPADM

## 2024-08-19 RX ORDER — SODIUM CHLORIDE, SODIUM LACTATE, POTASSIUM CHLORIDE, AND CALCIUM CHLORIDE .6; .31; .03; .02 G/100ML; G/100ML; G/100ML; G/100ML
1000 INJECTION, SOLUTION INTRAVENOUS ONCE
Status: COMPLETED | OUTPATIENT
Start: 2024-08-19 | End: 2024-08-19

## 2024-08-19 RX ORDER — BUPIVACAINE HYDROCHLORIDE 7.5 MG/ML
INJECTION, SOLUTION INTRASPINAL
Status: COMPLETED | OUTPATIENT
Start: 2024-08-19 | End: 2024-08-19

## 2024-08-19 RX ORDER — ONDANSETRON 2 MG/ML
4 INJECTION INTRAMUSCULAR; INTRAVENOUS EVERY 6 HOURS PRN
Status: DISCONTINUED | OUTPATIENT
Start: 2024-08-19 | End: 2024-08-19

## 2024-08-19 RX ORDER — KETOROLAC TROMETHAMINE 30 MG/ML
30 INJECTION, SOLUTION INTRAMUSCULAR; INTRAVENOUS EVERY 6 HOURS
Status: COMPLETED | OUTPATIENT
Start: 2024-08-19 | End: 2024-08-20

## 2024-08-19 RX ORDER — SODIUM CHLORIDE 9 MG/ML
INJECTION, SOLUTION INTRAVENOUS PRN
Status: DISCONTINUED | OUTPATIENT
Start: 2024-08-19 | End: 2024-08-19

## 2024-08-19 RX ORDER — SODIUM CHLORIDE 0.9 % (FLUSH) 0.9 %
10 SYRINGE (ML) INJECTION PRN
Status: DISCONTINUED | OUTPATIENT
Start: 2024-08-19 | End: 2024-08-19

## 2024-08-19 RX ORDER — HYDROCODONE BITARTRATE AND ACETAMINOPHEN 5; 325 MG/1; MG/1
1 TABLET ORAL EVERY 6 HOURS PRN
Status: DISCONTINUED | OUTPATIENT
Start: 2024-08-19 | End: 2024-08-20

## 2024-08-19 RX ORDER — SIMETHICONE 80 MG
80 TABLET,CHEWABLE ORAL EVERY 6 HOURS PRN
Status: DISCONTINUED | OUTPATIENT
Start: 2024-08-19 | End: 2024-08-21 | Stop reason: HOSPADM

## 2024-08-19 RX ORDER — MORPHINE SULFATE 1 MG/ML
INJECTION, SOLUTION EPIDURAL; INTRATHECAL; INTRAVENOUS
Status: COMPLETED | OUTPATIENT
Start: 2024-08-19 | End: 2024-08-19

## 2024-08-19 RX ORDER — SODIUM CHLORIDE, SODIUM LACTATE, POTASSIUM CHLORIDE, CALCIUM CHLORIDE 600; 310; 30; 20 MG/100ML; MG/100ML; MG/100ML; MG/100ML
INJECTION, SOLUTION INTRAVENOUS CONTINUOUS PRN
Status: DISCONTINUED | OUTPATIENT
Start: 2024-08-19 | End: 2024-08-19 | Stop reason: SDUPTHER

## 2024-08-19 RX ORDER — FAMOTIDINE 10 MG/ML
INJECTION, SOLUTION INTRAVENOUS PRN
Status: DISCONTINUED | OUTPATIENT
Start: 2024-08-19 | End: 2024-08-19 | Stop reason: SDUPTHER

## 2024-08-19 RX ORDER — SODIUM CHLORIDE 0.9 % (FLUSH) 0.9 %
5-40 SYRINGE (ML) INJECTION PRN
Status: DISCONTINUED | OUTPATIENT
Start: 2024-08-19 | End: 2024-08-21 | Stop reason: HOSPADM

## 2024-08-19 RX ORDER — IBUPROFEN 800 MG/1
800 TABLET ORAL EVERY 8 HOURS
Status: DISCONTINUED | OUTPATIENT
Start: 2024-08-20 | End: 2024-08-21 | Stop reason: HOSPADM

## 2024-08-19 RX ORDER — SODIUM CHLORIDE, SODIUM LACTATE, POTASSIUM CHLORIDE, CALCIUM CHLORIDE 600; 310; 30; 20 MG/100ML; MG/100ML; MG/100ML; MG/100ML
INJECTION, SOLUTION INTRAVENOUS CONTINUOUS
Status: DISCONTINUED | OUTPATIENT
Start: 2024-08-19 | End: 2024-08-19

## 2024-08-19 RX ORDER — HYDROCODONE BITARTRATE AND ACETAMINOPHEN 10; 325 MG/1; MG/1
1 TABLET ORAL EVERY 6 HOURS PRN
Status: DISCONTINUED | OUTPATIENT
Start: 2024-08-19 | End: 2024-08-20

## 2024-08-19 RX ORDER — OXYTOCIN 10 [USP'U]/ML
INJECTION, SOLUTION INTRAMUSCULAR; INTRAVENOUS PRN
Status: DISCONTINUED | OUTPATIENT
Start: 2024-08-19 | End: 2024-08-19 | Stop reason: SDUPTHER

## 2024-08-19 RX ORDER — GLYCOPYRROLATE 0.2 MG/ML
INJECTION INTRAMUSCULAR; INTRAVENOUS PRN
Status: DISCONTINUED | OUTPATIENT
Start: 2024-08-19 | End: 2024-08-19 | Stop reason: SDUPTHER

## 2024-08-19 RX ADMIN — BUPIVACAINE HYDROCHLORIDE IN DEXTROSE 14 MG: 7.5 INJECTION, SOLUTION SUBARACHNOID at 07:55

## 2024-08-19 RX ADMIN — FENTANYL CITRATE 15 MCG: 50 INJECTION, SOLUTION INTRAMUSCULAR; INTRAVENOUS at 07:55

## 2024-08-19 RX ADMIN — Medication 10 MG: at 09:39

## 2024-08-19 RX ADMIN — IOPAMIDOL 100 ML: 755 INJECTION, SOLUTION INTRAVENOUS at 10:34

## 2024-08-19 RX ADMIN — SODIUM CHLORIDE, POTASSIUM CHLORIDE, SODIUM LACTATE AND CALCIUM CHLORIDE 1000 ML: 600; 310; 30; 20 INJECTION, SOLUTION INTRAVENOUS at 05:55

## 2024-08-19 RX ADMIN — OXYTOCIN 30 UNITS: 10 INJECTION, SOLUTION INTRAMUSCULAR; INTRAVENOUS at 08:34

## 2024-08-19 RX ADMIN — Medication 80 MCG: at 08:06

## 2024-08-19 RX ADMIN — GLYCOPYRROLATE 0.2 MG: 0.2 INJECTION INTRAMUSCULAR; INTRAVENOUS at 08:06

## 2024-08-19 RX ADMIN — KETOROLAC TROMETHAMINE 30 MG: 30 INJECTION, SOLUTION INTRAMUSCULAR at 19:08

## 2024-08-19 RX ADMIN — MORPHINE SULFATE 0.2 MG: 1 INJECTION, SOLUTION EPIDURAL; INTRATHECAL; INTRAVENOUS at 07:55

## 2024-08-19 RX ADMIN — SODIUM CHLORIDE, POTASSIUM CHLORIDE, SODIUM LACTATE AND CALCIUM CHLORIDE: 600; 310; 30; 20 INJECTION, SOLUTION INTRAVENOUS at 07:00

## 2024-08-19 RX ADMIN — HYDROCODONE BITARTRATE AND ACETAMINOPHEN 1 TABLET: 10; 325 TABLET ORAL at 22:26

## 2024-08-19 RX ADMIN — SODIUM CHLORIDE, POTASSIUM CHLORIDE, SODIUM LACTATE AND CALCIUM CHLORIDE: 600; 310; 30; 20 INJECTION, SOLUTION INTRAVENOUS at 07:48

## 2024-08-19 RX ADMIN — Medication 80 MCG: at 08:08

## 2024-08-19 RX ADMIN — KETOROLAC TROMETHAMINE 30 MG: 30 INJECTION, SOLUTION INTRAMUSCULAR at 12:08

## 2024-08-19 RX ADMIN — ONDANSETRON 4 MG: 2 INJECTION INTRAMUSCULAR; INTRAVENOUS at 07:52

## 2024-08-19 RX ADMIN — SODIUM CHLORIDE, POTASSIUM CHLORIDE, SODIUM LACTATE AND CALCIUM CHLORIDE: 600; 310; 30; 20 INJECTION, SOLUTION INTRAVENOUS at 20:29

## 2024-08-19 RX ADMIN — Medication 10 MG: at 09:48

## 2024-08-19 RX ADMIN — FAMOTIDINE 20 MG: 10 INJECTION INTRAVENOUS at 07:52

## 2024-08-19 RX ADMIN — SODIUM CHLORIDE 3000 MG: 900 INJECTION INTRAVENOUS at 08:11

## 2024-08-19 RX ADMIN — Medication 80 MCG: at 08:10

## 2024-08-19 RX ADMIN — SODIUM CHLORIDE, POTASSIUM CHLORIDE, SODIUM LACTATE AND CALCIUM CHLORIDE: 600; 310; 30; 20 INJECTION, SOLUTION INTRAVENOUS at 10:11

## 2024-08-19 RX ADMIN — SODIUM CHLORIDE, POTASSIUM CHLORIDE, SODIUM LACTATE AND CALCIUM CHLORIDE: 600; 310; 30; 20 INJECTION, SOLUTION INTRAVENOUS at 16:01

## 2024-08-19 RX ADMIN — FENTANYL CITRATE 50 MCG: 50 INJECTION, SOLUTION INTRAMUSCULAR; INTRAVENOUS at 08:14

## 2024-08-19 RX ADMIN — Medication 25 MG: at 08:06

## 2024-08-19 RX ADMIN — Medication 25 MG: at 08:10

## 2024-08-19 ASSESSMENT — PAIN - FUNCTIONAL ASSESSMENT
PAIN_FUNCTIONAL_ASSESSMENT: ACTIVITIES ARE NOT PREVENTED
PAIN_FUNCTIONAL_ASSESSMENT: PREVENTS OR INTERFERES SOME ACTIVE ACTIVITIES AND ADLS

## 2024-08-19 ASSESSMENT — PAIN DESCRIPTION - LOCATION
LOCATION: ABDOMEN

## 2024-08-19 ASSESSMENT — PAIN DESCRIPTION - DESCRIPTORS: DESCRIPTORS: ACHING;SORE

## 2024-08-19 ASSESSMENT — PAIN DESCRIPTION - ORIENTATION
ORIENTATION: ANTERIOR;LOWER
ORIENTATION: ANTERIOR

## 2024-08-19 ASSESSMENT — PAIN SCALES - GENERAL
PAINLEVEL_OUTOF10: 3
PAINLEVEL_OUTOF10: 5

## 2024-08-19 NOTE — PROGRESS NOTES
0740 This RN spoke with Odette in the Pharmacy and verified that pt could take Ancef 3g for her c/s, as she has done well with other cephlasporins in the past    0750 This RN and pt entered OR 2 in stable condition     0907 Delivery QBL= 1,010    0927 Bedside report received from Mindy Almeida CRNA. BP's soft (80s/40s) IV fluid bolus started.    0939 This RN notified BRAULIO Almeida of pt's bp of 79/40. NC applied r/t o2 sats being 88%. Pt asymptomatic.     0944 BRAULIO Almeida at bedside     0949 NC increased to 4 L. Pt o2 sats 87%    1000 BRAULIO Moralez informed of BP of 88/44. CRNA stated to continue giving fluid bolus. Bilateral lung sounds clear. HR 61.     1003 MD Liudmila informed    1006 MD Liudmila at bedside     1015 o2 increased to 8L 90%     1016 MD Selma at bedside .    1017 RRT RN Hilda Singh at bedside for rapid reponse    1018 [place]  at bedside. Nonrebreather applied.     1047 This RN notified MD Liudmila of pt's return from CT to room 205. MD stated pt did not need to be on remote tele, and ordered an EKG    1050 o2 sats 100%. O2 taken down to 4 L and nonrebreather replaced with NC.     1102 o2 sats 97%    1123 o2 sats 93% on 2L NC    1130 Total QRW=1507    1142 This RN updated MD Liudmila on pt condition.     1708 Pulmonary at bedside evaluating pt

## 2024-08-19 NOTE — H&P
History & Physical    Name: India Franklin MRN: [id number]  SSN: [id number]    YOB: 1985  Age: 39 y.o.  Sex: female        Subjective:     Estimated Date of Delivery: 24  OB History          2    Para   2    Term   2       0    AB   0    Living   2         SAB        IAB        Ectopic        Molar        Multiple   0    Live Births   2                Ms. Franklin is admitted with pregnancy at 39w3d for  Section and bilateral salpingectomy. Prenatal course was complicated by  AMA and obesity . Please see prenatal records for details.    Patient Active Problem List    Diagnosis Date Noted    Previous  section 2024    Supervision of elderly multigravida, antepartum 2024     Flu vaccine   Intrauterine pregnancy with the following problems identified:  EDC 24 D=US  AMA 37 at delivery  NIPTS normal male   Horizon negative ()  Femara pregnancy  Hx of LTCS documented fetal intolerance to labor - repeat CS with bilateral salpingectomy  Hx of GHTN - LDA 12 weeks  Flu vaccine   Rh negative  Baseline PCR 0.0   AFP- neg  Early GTT normal  Repeat C/S bilateral salpingectomy 24   1hr GTT- normal  GBS pos - clind resistant - Ancef okay       TESSA (obstructive sleep apnea) 2023     mild TESSA on [employer] home sleep test.  AHI 7.3.  16 apneas in 14.8 hours        Exercise-induced asthma     Obesity, morbid (HCC) 2018    At high risk for breast cancer 2017     G[place]mer[place] risk 24.3 %        Dysmenorrhea     Family history of breast cancer 09/15/2014     Mother diagnosed with breast cancer age 35(Trina Mejia ), maternal   cousin, maternal aunt also with breast cancer all post menopausal. Her   mother does not carry the BRCA gene.             Past Medical History:   Diagnosis Date    COVID-19 2022    Dysmenorrhea     Dr. Nur    Ear infection     currently on azithromycin    Exercise-induced asthma     Family history of breast

## 2024-08-19 NOTE — ANESTHESIA PROCEDURE NOTES
Spinal Block    Patient location during procedure: OB  End time: 8/19/2024 8:10 AM  Reason for block: primary anesthetic  Staffing  Performed: resident/CRNA   Anesthesiologist: Sherman Goel MD  Resident/CRNA: Mindy Almeida APRN - CRNA  Performed by: Mindy Almeida APRN - CRNA  Authorized by: Sherman Goel MD    Spinal Block  Patient position: sitting  Prep: ChloraPrep  Patient monitoring: continuous pulse ox, continuous capnometry and frequent blood pressure checks  Approach: midline  Location: L4/L5  Provider prep: mask  Needle  Needle type: Sprotte Tip   Needle gauge: 22 G  Needle length: 4 in  Assessment  Swirl obtained: Yes  CSF: clear  Attempts: 1  Hemodynamics: stable  Preanesthetic Checklist  Completed: patient identified, IV checked, site marked, risks and benefits discussed, surgical/procedural consents, equipment checked, pre-op evaluation, timeout performed, anesthesia consent given, oxygen available, monitors applied/VS acknowledged, fire risk safety assessment completed and verbalized and blood product R/B/A discussed and consented

## 2024-08-19 NOTE — PROGRESS NOTES
Rapid Called at 10:16    Responded to RRT at 10:17 for Hypoxia and Hypotension    Per primary RN patient is s/p  delivery with spinal block.   Patients BP is 79/40, HR 54, SpO2 86%.  Patient on non-breather. SpO2 now 100%.     Anesthesiologist and OB MD at bedside.     NAD. Patient denies shortness of breath and chest pain. States she feels dizzy.     Orders received for STAT CTA chest, labs (COAG, CMP, CBC), and EKG.     10:49: /56, HR: 65    Provider at bedside: YES  Interventions ordered: Labs and Other (Comment)  Sepsis Suspected: No  Transfer to Higher Level of Care: No     Rapid Ended at 10:52    RRT RN assisted with transport to CT.    Hilda Singh, RN

## 2024-08-19 NOTE — CONSULTS
Consulting service: OB  Reason for consult: Hypoxia    CC: Feels fine    HPI: 39 year old female with childhood exercise induced asthma who presented today for Csection. During the procedure she was noted to be hypotensive and hypoxic. A CTA done did not show a PE but did reveal patchy B/L airspace disease.     Patient seen in room with  son. States she feels fine and has throughout the day. Denies dyspnea, cough, wheeze. Confirms that asthma was diagnosed and treated while young and hasn't used inhaled medications in years. Denies overt URI symptoms in the few days leading up to now but is on a zpack for an ear infection from her PCP. This is her 2nd pregnancy and Csection and she did not have issues similar to this with the first. Of note, no vomiting noted during Csection and she did not receive any sedating medications.    PMH:   Childhood asthma    SH: Never smoker    FH: Non contributory to current presentation    ROS: 12 point review of systems completed and negative other than as noted in the HPI    Vitals:    24 1406   BP: (!) 86/51   Pulse: 86   Resp:    Temp:    SpO2:      Gen: Well appearing female in no distress  HEENT: NCAT  CV: Regular  Lungs: CTA B/L, exam limited as she was breastfeeding at the time. On room air  GI: Post partum  Ext: No edema  Neuro: Alert, oriented, no focal deficits    Lab Results   Component Value Date    WBC 9.1 2024    HGB 10.3 (L) 2024    HCT 30.5 (L) 2024    MCV 88.7 2024     2024     Lab Results   Component Value Date/Time     2024 10:16 AM    K 4.2 2024 10:16 AM     2024 10:16 AM    CO2 22 2024 10:16 AM    BUN 7 2024 10:16 AM    CREATININE 0.61 2024 10:16 AM    GLUCOSE 94 2024 10:16 AM    CALCIUM 7.9 2024 10:16 AM    LABGLOM >90 2024 10:16 AM    LABGLOM >60 2024 04:22 PM    LABGLOM >60 2023 03:31 PM      Chest CT personally reviewed showing patchy

## 2024-08-19 NOTE — L&D DELIVERY NOTE
RN Staff Nurse              [place] Assessment    Living Status: Living  Delivery Location Comment: 2        Skin Color:   Heart Rate:   Reflex Irritability:   Muscle Tone:   Respiratory Effort:   Total:            1 Minute:    1    2    2    2    2    9         5 Minute:    1    2    2    2    2    9                                        Apgars Assigned By: JERI CONTRERAS RN              Resuscitation    Method: Stimulation, Room Air              Measurements      Birth Weight: 3535 g   Birth Length: 48.9 cm     Head Circumference: 36.5 cm     Chest Circumference: 33 cm     Abdominal Girth: 31 cm

## 2024-08-19 NOTE — PROGRESS NOTES
Spiritual Health Assessment/Progress Note  [facility]    Crisis,  ,  ,      Name: India Franklin MRN: [id number]    Age: 39 y.o.     Sex: female   Language: English   Taoism: Caodaism   Previous  section     Date: 2024            Total Time Calculated: 15 min              Spiritual Assessment began in [facility] B[facility] LABOR & DELIVERY        Referral/Consult From: Nurse   Encounter Overview/Reason: Crisis  Service Provided For: Patient not available    Cris, Belief, Meaning:   Patient unable to speak with Pt at this time  Family/Friends Other:  present       Importance and Influence:  Patient unable to communicate at this time  Family/Friends Other: present     Community:  Patient   Family/Friends     Assessment and Plan of Care:     Patient Interventions include:   Family/Friends Interventions include:     Patient Plan of Care: Spiritual Care available upon further referral  Family/Friends Plan of Care: Spiritual Care available upon further referral    Narrative:  Rapid Response for patient:  responded to call and consulted with medical staff outside patients room. Pt is speaking with medical staff and being taking for testing.  and father of new born baby is holding baby and cell phone, and speaking with staff in the room.  did not enter busy room at this time but provided a prayerful presence for staff and availability. Chaplains will continue to be available for care and support.     Electronically signed by SARAN Chilel on 2024 at 11:09 AM   Chaplain Will Naranjo M.Div., SARAN.  For  support, please call Spiritual Health Team @ [phone]

## 2024-08-19 NOTE — PROGRESS NOTES
Pt arrived to unit from home for scheduled repeat  section. Pt oriented to room, given call bell, and instructed to change into pt gown.    0715 - SBAR report given to KINGA Bravo RN

## 2024-08-19 NOTE — OP NOTE
Operative Note    Name: India Franklin   Medical Record Number: [id number]   YOB: 1985  Today's Date: 2024      Pre-operative Diagnosis: Previous  delivery affecting pregnancy [O34.219], fam history of breast cancer    Post-operative Diagnosis: TLBMI    Operation: low transverse  section Procedure(s):   SECTION and bilateral salpingectomy    Surgeon(s):  Mine Nur MD    Anesthesia: Spinal    EBL: 750 cc    Prophylactic Antibiotics: Ancef  DVT Prophylaxis: Sequential Compression Devices         Fetal Description: beaver     Birth Information:   Information for the patient's :  Shelby Franklin [[id number]]   @[phone]@    Umbilical Cord: 3 vessels present    Placenta:  expressed    Specimens: bilateral fallopian tubes           Complications:  none    Implants: none    Surgical Assistant: Thomas Castellanos  Scrub Person First: Yony Lopez      Procedure Detail:      After proper patient identification and consent, the patient was taken to the operating room, where spinal anesthesia was administered and found to be adequate. Arguello catheter had been placed using sterile technique.  The patient was prepped and draped in the normal sterile fashion.The abdomen was entered using the Pfannenstiel technique. The peritoneum was entered bluntely well superior to the bladder without any apparent injury. An Aaron retractor was placed.  Palpation revealed no bowel below the retractor. The bladder flap was created without difficulty. A low transverse uterine incision was made with the scalpel and extended with blunt finger dissection. Amniotomy was performed and the fluid was medium amount clear.  The baby’s head elevated and the remainder of the infant was then delivered atraumatically. The nose and mouth were suctioned. The cord was clamped and cut and the baby was handed off to Nursing staff in attendance. The placenta was expressed. The

## 2024-08-19 NOTE — PROGRESS NOTES
Patient seen at 10:06 this am after her CS due to requiring more O2 and BP low.    During CS she required ephedrine and tina for low BP due to spinal, then BP shot up to 210. Resolved quickly on it's own without treatment. However, she did require some O2 during CS to maintain sats.  Recently started on Zpack for URI/ear infection by her reg MD. No cough.    Post CS BP remained low. Seen by anesthesia and given additional ephedrine. She continued to require O2 - mouth breathing so switch to NRB. CTA chest neg with ?airspace disease. Still requiring 3L NC, BP now normal.    Patient has remained calm and denies SOB throughout.   Hgb stable    Will get pulmonary consult for recs.

## 2024-08-19 NOTE — CONSULTS
Clinical Ethics Consultation Note    ERD Advisement    Consulted by Dr. Nur for this 39 year old female who has requested a salpingectomy to reduce the risk of cancer.? The patient will have a  today. The patient has increased risk for breast, ovarian, and/or uterine cancer due to family history of breast cancer. The patient has been counseled regarding irreversible infertility. This procedure is consistent with the ERDs, as there is a strong likelihood of existing pathology in her fallopian tubes, and the procedure is morally justified.? For questions or concerns regarding this consultation, please contact me directly.    Radha Mcbride  Ethics Consultant?

## 2024-08-19 NOTE — ANESTHESIA PRE PROCEDURE
Department of Anesthesiology  Preprocedure Note       Name:  India Franklin   Age:  39 y.o.  :  1985                                          MRN:  [id number]         Date:  2024      Surgeon: Surgeon(s):  Mine Nur MD    Procedure: Procedure(s):   SECTION    Medications prior to admission:   Prior to Admission medications    Medication Sig Start Date End Date Taking? Authorizing Provider   azithromycin (ZITHROMAX) 250 MG tablet 500mg on day 1 followed by 250mg on days 2 - 5 24 Yes Timothy Villa, KARLA - CNP   aspirin 81 MG EC tablet Take 1 tablet by mouth daily   Yes ProviderTenisha MD   Prenatal Vit-Fe Fumarate-FA (PRENATAL VITAMIN) 27-0.8 MG TABS Take 1 tablet by mouth daily 24  Yes Mine Nur MD       Current medications:    Current Facility-Administered Medications   Medication Dose Route Frequency Provider Last Rate Last Admin   • lactated ringers IV soln infusion   IntraVENous Continuous Mine Nur MD       • lactated ringers bolus 1,000 mL  1,000 mL IntraVENous Once Mine Nur  mL/hr at 24 0555 1,000 mL at 24 0555   • sodium chloride flush 0.9 % injection 10 mL  10 mL IntraVENous PRN Mine Nur MD       • 0.9 % sodium chloride infusion   IntraVENous PRN Mine Nur MD       • oxytocin (PITOCIN) 30 units in 500 mL infusion  87.3 claudia-units/min IntraVENous Continuous PRN Mine Nur MD        And   • oxytocin (PITOCIN) 10 unit bolus from the bag  10 Units IntraVENous PRN Mine Nur MD       • ondansetron (ZOFRAN) injection 4 mg  4 mg IntraVENous Q6H PRN Mine Nur MD           Allergies:    Allergies   Allergen Reactions   • Amoxicillin-Pot Clavulanate Hives     19: Per RN pt has tolerated Keflex in the past   • Penicillins      Other reaction(s): Unable to Obtain  19: Per RN pt has tolerated Keflex in the past   • Sulfamethoxazole-Trimethoprim Hives       Problem List:    Patient Active Problem List

## 2024-08-19 NOTE — ANESTHESIA POSTPROCEDURE EVALUATION
Department of Anesthesiology  Postprocedure Note    Patient: India Franklin  MRN: [id number]  YOB: 1985  Date of evaluation: 2024    Procedure Summary       Date: 24 Room / Location: [facility] L&D 02 / [facility] L&D OR    Anesthesia Start: 751 Anesthesia Stop: 927    Procedure:  SECTION (Abdomen) Diagnosis:       Previous  delivery affecting pregnancy      (Previous  delivery affecting pregnancy [O34.219])    Surgeons: Mine Nur MD Responsible Provider: Sherman Goel MD    Anesthesia Type: Spinal ASA Status: 3            Anesthesia Type: Spinal    Lux Phase I: Lux Score: 9    Lux Phase II: Lux Score: 9    Anesthesia Post Evaluation    Patient location during evaluation: bedside  Patient participation: complete - patient participated  Level of consciousness: awake and alert  Pain score: 3  Airway patency: patent  Nausea & Vomiting: no nausea and no vomiting  Cardiovascular status: hypotensive  Respiratory status: spontaneous ventilation  Hydration status: euvolemic  Comments: Called to see at bedside. Multiple nuses + Dr Nur. Concerned that Oxygen saturation in 85% and BP 95/ 60s. Patient has nasal canula with 8 L /min Oxygen but mouth breathing. At this point she has had approx 3 liters lactated ringer.  Gave ephedrine 10 mg iv, changed to non rebreathing face mask with 10 l/MIN. Saturation improved into low 90s, and BP increased. Block height approx T10. Bilateral. Patient talking, not struggling for breath, but says feels a little light headed. Reassured. Suspect hypotension leading to pulmonary shunting and hypoxemia, both improved with simple treatment. Obs concerned about possible pulmonary embolism and proceeding with further investigations.    Pain management: satisfactory to patient    No notable events documented.

## 2024-08-20 LAB
ERYTHROCYTE [DISTWIDTH] IN BLOOD BY AUTOMATED COUNT: 14 % (ref 11.5–14.5)
HCT VFR BLD AUTO: 27.8 % (ref 35–47)
HGB BLD-MCNC: 9.2 G/DL (ref 11.5–16)
MCH RBC QN AUTO: 29.8 PG (ref 26–34)
MCHC RBC AUTO-ENTMCNC: 33.1 G/DL (ref 30–36.5)
MCV RBC AUTO: 90 FL (ref 80–99)
NRBC # BLD: 0 K/UL (ref 0–0.01)
NRBC BLD-RTO: 0 PER 100 WBC
PLATELET # BLD AUTO: 166 K/UL (ref 150–400)
PMV BLD AUTO: 9.9 FL (ref 8.9–12.9)
RBC # BLD AUTO: 3.09 M/UL (ref 3.8–5.2)
T PALLIDUM AB SER QL IA: NON REACTIVE
WBC # BLD AUTO: 9.4 K/UL (ref 3.6–11)

## 2024-08-20 PROCEDURE — 6360000002 HC RX W HCPCS: Performed by: OBSTETRICS & GYNECOLOGY

## 2024-08-20 PROCEDURE — 1120000000 HC RM PRIVATE OB

## 2024-08-20 PROCEDURE — 36415 COLL VENOUS BLD VENIPUNCTURE: CPT

## 2024-08-20 PROCEDURE — 85027 COMPLETE CBC AUTOMATED: CPT

## 2024-08-20 PROCEDURE — 6370000000 HC RX 637 (ALT 250 FOR IP): Performed by: OBSTETRICS & GYNECOLOGY

## 2024-08-20 RX ORDER — OXYCODONE HYDROCHLORIDE 5 MG/1
5 TABLET ORAL EVERY 4 HOURS PRN
Status: DISCONTINUED | OUTPATIENT
Start: 2024-08-20 | End: 2024-08-21 | Stop reason: HOSPADM

## 2024-08-20 RX ORDER — OXYCODONE HYDROCHLORIDE 5 MG/1
10 TABLET ORAL EVERY 4 HOURS PRN
Status: DISCONTINUED | OUTPATIENT
Start: 2024-08-20 | End: 2024-08-21 | Stop reason: HOSPADM

## 2024-08-20 RX ADMIN — KETOROLAC TROMETHAMINE 30 MG: 30 INJECTION, SOLUTION INTRAMUSCULAR at 06:44

## 2024-08-20 RX ADMIN — ACETAMINOPHEN 1000 MG: 500 TABLET ORAL at 06:46

## 2024-08-20 RX ADMIN — OXYCODONE HYDROCHLORIDE 5 MG: 5 TABLET ORAL at 18:07

## 2024-08-20 RX ADMIN — ACETAMINOPHEN 1000 MG: 500 TABLET ORAL at 14:36

## 2024-08-20 RX ADMIN — Medication 1 TABLET: at 08:09

## 2024-08-20 RX ADMIN — SENNOSIDES AND DOCUSATE SODIUM 1 TABLET: 50; 8.6 TABLET ORAL at 08:09

## 2024-08-20 RX ADMIN — IBUPROFEN 800 MG: 800 TABLET, FILM COATED ORAL at 20:46

## 2024-08-20 RX ADMIN — KETOROLAC TROMETHAMINE 30 MG: 30 INJECTION, SOLUTION INTRAMUSCULAR at 01:37

## 2024-08-20 RX ADMIN — AZITHROMYCIN DIHYDRATE 250 MG: 250 TABLET ORAL at 08:09

## 2024-08-20 RX ADMIN — ACETAMINOPHEN 1000 MG: 500 TABLET ORAL at 20:46

## 2024-08-20 RX ADMIN — IBUPROFEN 800 MG: 800 TABLET, FILM COATED ORAL at 12:27

## 2024-08-20 ASSESSMENT — PAIN SCALES - GENERAL
PAINLEVEL_OUTOF10: 4
PAINLEVEL_OUTOF10: 5
PAINLEVEL_OUTOF10: 3
PAINLEVEL_OUTOF10: 2
PAINLEVEL_OUTOF10: 5
PAINLEVEL_OUTOF10: 5

## 2024-08-20 ASSESSMENT — PAIN DESCRIPTION - ORIENTATION
ORIENTATION: LOWER
ORIENTATION: ANTERIOR
ORIENTATION: LOWER
ORIENTATION: ANTERIOR;LOWER
ORIENTATION: LOWER

## 2024-08-20 ASSESSMENT — PAIN DESCRIPTION - LOCATION
LOCATION: ABDOMEN

## 2024-08-20 ASSESSMENT — PAIN - FUNCTIONAL ASSESSMENT
PAIN_FUNCTIONAL_ASSESSMENT: ACTIVITIES ARE NOT PREVENTED
PAIN_FUNCTIONAL_ASSESSMENT: ACTIVITIES ARE NOT PREVENTED

## 2024-08-20 ASSESSMENT — PAIN DESCRIPTION - DESCRIPTORS
DESCRIPTORS: DISCOMFORT
DESCRIPTORS: ACHING;CRAMPING
DESCRIPTORS: ACHING;SORE
DESCRIPTORS: ACHING;CRAMPING
DESCRIPTORS: DISCOMFORT

## 2024-08-20 NOTE — CARE COORDINATION
8/20/2024  2:07 PM    CM met with EVON to complete initial assessment and begin discharge planning.  MOB verified and confirmed demographics.  EVON lives with family,  at the address on file. EVON reports she has good family support, and feels like she has the support she needs when she returns home.  EVON plans to breast feed baby and has pump to use at home.  [facility] will provide follow up care for infant. EVON has car seat, bassinet/crib, clothing, bottles and all necessary supplies for baby.        08/20/24 7757   Service Assessment   Patient Orientation Alert and Oriented   Cognition Alert   History Provided By Patient   Primary Caregiver Self   Support Systems Spouse/Significant Other   PCP Verified by CM Yes   Last Visit to PCP Within last 3 months   Prior Functional Level Independent in ADLs/IADLs   Current Functional Level Independent in ADLs/IADLs   Can patient return to prior living arrangement Yes   Ability to make needs known: Good   Family able to assist with home care needs: Yes   Would you like for me to discuss the discharge plan with any other family members/significant others, and if so, who? No   Financial Resources Other (Comment)     Aaron Dozier CM

## 2024-08-20 NOTE — PROGRESS NOTES
Post-Operative Day Number 1 Progress Note    Patient doing well post-op day 1 from  delivery without significant complaints.  Pain controlled on current medication.  Voiding without difficulty, normal lochia.    Vitals:  Patient Vitals for the past 8 hrs:   BP Temp Temp src Pulse Resp SpO2   24 1400 118/66 98.1 °F (36.7 °C) Oral 85 16 97 %   24 1000 128/60 98 °F (36.7 °C) Oral 88 16 94 %     Temp (24hrs), Av.1 °F (36.7 °C), Min:98 °F (36.7 °C), Max:98.3 °F (36.8 °C)      Vital signs stable, afebrile.    Exam:  Patient without distress.               Abdomen soft, fundus firm at level of umbilicus, nontender.                 Incision dry and clean without erythema.               Lower extremities are negative for swelling, cords or tenderness.    Labs:   Recent Results (from the past 24 hour(s))   CBC    Collection Time: 24  5:56 AM   Result Value Ref Range    WBC 9.4 3.6 - 11.0 K/uL    RBC 3.09 (L) 3.80 - 5.20 M/uL    Hemoglobin 9.2 (L) 11.5 - 16.0 g/dL    Hematocrit 27.8 (L) 35.0 - 47.0 %    MCV 90.0 80.0 - 99.0 FL    MCH 29.8 26.0 - 34.0 PG    MCHC 33.1 30.0 - 36.5 g/dL    RDW 14.0 11.5 - 14.5 %    Platelets 166 150 - 400 K/uL    MPV 9.9 8.9 - 12.9 FL    Nucleated RBCs 0.0 0  WBC    nRBC 0.00 0.00 - 0.01 K/uL       Assessment and Plan:  Patient appears to be having uncomplicated post- course.  Continue routine post-op care and maternal education.

## 2024-08-21 VITALS
DIASTOLIC BLOOD PRESSURE: 67 MMHG | HEIGHT: 69 IN | HEART RATE: 83 BPM | WEIGHT: 293 LBS | RESPIRATION RATE: 16 BRPM | TEMPERATURE: 98.4 F | SYSTOLIC BLOOD PRESSURE: 135 MMHG | OXYGEN SATURATION: 96 % | BODY MASS INDEX: 43.4 KG/M2

## 2024-08-21 PROBLEM — O09.529 SUPERVISION OF ELDERLY MULTIGRAVIDA, ANTEPARTUM: Status: RESOLVED | Noted: 2024-01-18 | Resolved: 2024-08-21

## 2024-08-21 PROCEDURE — 6370000000 HC RX 637 (ALT 250 FOR IP): Performed by: OBSTETRICS & GYNECOLOGY

## 2024-08-21 RX ORDER — OXYCODONE HYDROCHLORIDE 5 MG/1
5 TABLET ORAL EVERY 4 HOURS PRN
Qty: 20 TABLET | Refills: 0 | Status: SHIPPED | OUTPATIENT
Start: 2024-08-21 | End: 2024-08-24

## 2024-08-21 RX ORDER — IBUPROFEN 800 MG/1
800 TABLET ORAL EVERY 8 HOURS PRN
Qty: 30 TABLET | Refills: 1 | Status: SHIPPED | OUTPATIENT
Start: 2024-08-21

## 2024-08-21 RX ADMIN — AZITHROMYCIN DIHYDRATE 250 MG: 250 TABLET ORAL at 08:32

## 2024-08-21 RX ADMIN — OXYCODONE HYDROCHLORIDE 5 MG: 5 TABLET ORAL at 02:29

## 2024-08-21 RX ADMIN — ACETAMINOPHEN 1000 MG: 500 TABLET ORAL at 06:22

## 2024-08-21 RX ADMIN — OXYCODONE HYDROCHLORIDE 5 MG: 5 TABLET ORAL at 08:32

## 2024-08-21 RX ADMIN — OXYCODONE HYDROCHLORIDE 5 MG: 5 TABLET ORAL at 13:11

## 2024-08-21 RX ADMIN — SIMETHICONE 80 MG: 80 TABLET, CHEWABLE ORAL at 08:35

## 2024-08-21 RX ADMIN — SIMETHICONE 80 MG: 80 TABLET, CHEWABLE ORAL at 00:22

## 2024-08-21 RX ADMIN — SENNOSIDES AND DOCUSATE SODIUM 1 TABLET: 50; 8.6 TABLET ORAL at 08:32

## 2024-08-21 RX ADMIN — Medication 1 TABLET: at 08:32

## 2024-08-21 RX ADMIN — IBUPROFEN 800 MG: 800 TABLET, FILM COATED ORAL at 06:22

## 2024-08-21 ASSESSMENT — PAIN SCALES - GENERAL
PAINLEVEL_OUTOF10: 4
PAINLEVEL_OUTOF10: 4
PAINLEVEL_OUTOF10: 3
PAINLEVEL_OUTOF10: 5

## 2024-08-21 ASSESSMENT — PAIN DESCRIPTION - LOCATION
LOCATION: ABDOMEN;INCISION
LOCATION: ABDOMEN
LOCATION: ABDOMEN
LOCATION: ABDOMEN;INCISION

## 2024-08-21 ASSESSMENT — PAIN DESCRIPTION - ORIENTATION
ORIENTATION: LOWER

## 2024-08-21 ASSESSMENT — PAIN DESCRIPTION - DESCRIPTORS
DESCRIPTORS: SORE
DESCRIPTORS: ACHING;SORE

## 2024-08-21 NOTE — PROGRESS NOTES
Post-Operative Day Number 2 Progress Note    Patient doing well post-op day 2 from  delivery without significant complaints.  Pain controlled on current medication.  Voiding without difficulty, normal lochia.    Vitals:  Patient Vitals for the past 8 hrs:   BP Temp Temp src Pulse Resp SpO2   24 0832 -- -- -- -- 16 --   24 0714 135/67 98.4 °F (36.9 °C) Oral 83 16 96 %     Temp (24hrs), Av.2 °F (36.8 °C), Min:98 °F (36.7 °C), Max:98.4 °F (36.9 °C)      Vital signs stable, afebrile.    Exam:  Patient without distress.               Abdomen soft, fundus firm at level of umbilicus, nontender.                Incision dry and clean without erythema.               Lower extremities are negative for swelling, cords or tenderness.    Labs: No results found for this or any previous visit (from the past 24 hour(s)).    Assessment and Plan:  Patient appears to be having uncomplicated post- course.  Continue routine post-op care and maternal education.

## 2024-08-21 NOTE — DISCHARGE SUMMARY
Information       Follow up With Specialties Details Why Contact Info    Mine Nur MD Obstetrics & Gynecology, Gynecology, Obstetrics Follow up in 6 week(s)  [id number] [id number] [phone]                Signed By:  Mine Nur MD     August 21, 2024

## 2024-08-21 NOTE — LACTATION NOTE
This note was copied from a baby's chart.    
This note was copied from a baby's chart.  Rounding for lactation consult.  Mother in BR.  FOB states baby is feeding well. Adequate output noted in chart, frequent feeds.  Mother encouraged to call for next feed or if needs assistance.  Dyad for discharge today.   
feedings/output.  Taught to BF at hunger cues and or q 2-3 hrs and to offer 10-20 drops of hand expressed colostrum at any non-feeds.      Left Breast: Soft  Left Nipple: Protrude  Right Nipple: Protrude  Right Breast: Soft  Position and Latch: Independently  Signs of Transfer: Audible infant swallows, Nutritive sucking, Uterine cramping  Maternal Response:  Comfortable with position, Relaxed and confident      Formula Type: Similac 360 Total Care     Latch: Grasps breast, tongue down, lips flanged, rhythmic sucking  Audible Swallowing: A few with stimulation  Type of Nipple: Everted (after stimulation)  Comfort (Breast/Nipple): Soft/non-tender  Hold (Positioning): No assist from staff, mother able to position/hold infant  LATCH Score: 9  Breast Care: Lanolin provided, Other (Comment) (Hydro gel pads)     Lactation Comment: Baby was latched on well to right breast in cradle hold and nursing vigorously.

## 2024-08-21 NOTE — DISCHARGE INSTRUCTIONS
POST DELIVERY DISCHARGE INSTRUCTIONS    Name: India Franklin  YOB: 1985  Primary Diagnosis: [unfilled]    General:     Diet/Diet Restrictions:  Eight 8-ounce glasses of fluid daily (water, juices); avoid excessive caffeine intake.  Meals/snacks as desired which are high in fiber and carbohydrates and low in fat and cholesterol.    Medications:   {Medication reconciliation information is now added to the patient's AVS automatically when it is printed.  There is no need to use this SmartLink in discharge instructions.  Highlight this text and delete it to clear this message}      Physical Activity / Restrictions / Safety:     Avoid heavy lifting, no more that 8 lbs. For 2-3 weeks; No driving while taking narcotic pain medication. Post  patients should not drive until pain free.  No intercourse 4-6 weeks, no douching or tampon use. May resume exercise in 6 weeks.         Discharge Instructions/Special Treatment/Home Care Needs:     Continue prenatal vitamins.  Continue to use squirt bottle with warm water on your episiotomy after each bathroom use until bleeding stops.  If steri-strips applied to your incision, remove in 7 days.  Take stool softeners daily.    Call your doctor for the following:     Fever over 101 degrees by mouth.  Vaginal bleeding heavier than a normal menstrual period or lost larger than a golf ball.  Red streaks or increased swelling of legs, painful red streaks on your breast.  Painful urination, or increased pain, redness or discharge with your incision.    Pain Management:     Pain Management:   Take Acetaminophen (Tylenol) or Ibuprofen (Advil, Motrin), as directed for pain. Use a warm Sitz bath 3 times daily to relieve episiotomy or hemorrhoidal discomfort. Heating pad to  incision as needed. For hemorrhoidal discomfort, use Tucks and Anusol cream as needed and directed.    Follow-Up Care:     Pt to scheduled follow-up appt in 6 weeks    Telephone number:

## 2024-08-21 NOTE — PROGRESS NOTES
Pt off unit in stable condition via wheelchair with volunteers for discharge home per Dr. Nur. Pt is aware to follow up in 6 weeks. Prescriptions electronically sent to pt's pharmacy by Dr. Nur.  Pt denies any HA, dizziness, N/V, or pain at this time. Infant in car seat and discharged with mother. Postpartum discharge teaching completed by this RN. Perineal supplies given to pt. Discharge papers signed by pt and RN.

## 2024-08-23 NOTE — PROGRESS NOTES
India Franklin is a 39 y.o. female presents for a problem visit.    Chief Complaint   Patient presents with    Follow-up     BP recheck     No LMP recorded.  Post-Op BP Check  Birth Control: none.  Last Pap: Normal, HPV Negative obtained 2 year(s) ago 12/22/22    1. Have you been to the ER, urgent care clinic, or hospitalized since your last visit? Urgent care 8/17/24, ear infection    2. Have you seen or consulted any other health care providers outside of the [facility] since your last visit? No    Examination chaperoned by Anita Calabrese LPN.

## 2024-08-26 ENCOUNTER — OFFICE VISIT (OUTPATIENT)
Age: 39
End: 2024-08-26

## 2024-08-26 VITALS
HEART RATE: 74 BPM | HEIGHT: 69 IN | BODY MASS INDEX: 43.4 KG/M2 | WEIGHT: 293 LBS | SYSTOLIC BLOOD PRESSURE: 131 MMHG | DIASTOLIC BLOOD PRESSURE: 74 MMHG

## 2024-08-26 DIAGNOSIS — R03.0 ELEVATED BLOOD PRESSURE READING WITHOUT DIAGNOSIS OF HYPERTENSION: ICD-10-CM

## 2024-08-26 PROCEDURE — 0503F POSTPARTUM CARE VISIT: CPT | Performed by: OBSTETRICS & GYNECOLOGY

## 2024-08-26 NOTE — PROGRESS NOTES
OB/GYN Problem VIsit    HPI  India Franklin is a ,  39 y.o. female who presents for a problem visit.  She had an elevated blood pressure reading on post op day 4 when she was at home.  These were not severe range and have normalized over the weekend.  Patient was offered a visit when this occurred but chose to come in today.  She still has swelling in her lower extremity.  She had a  1 week ago.  Her baby is doing well and she is breast-feeding.  She has no symptoms or signs of preeclampsia.        Past Medical History:   Diagnosis Date    COVID-19 2022    Dysmenorrhea     Dr. Nur    Ear infection     currently on azithromycin    Exercise-induced asthma     Family history of breast cancer     mother age 35.  cousin and aunt    Gestational hypertension     first pregnancy    HPV vaccine counseling     gardasil series complete    Knee pain, chronic     softball injuries. cartillage damage    Morbid obesity (HCC)     TESSA (obstructive sleep apnea) 2023    mild TESSA on Acc[employer] home sleep test.  AHI 7.3.  16 apneas in 14.8 hours    Patellofemoral arthralgia of both knees     hx of rowing, catching     Past Surgical History:   Procedure Laterality Date     SECTION  2019    Son      SECTION N/A 2024     SECTION performed by Mine Nur MD at [facility] OR     Social History     Occupational History    Not on file   Tobacco Use    Smoking status: Never    Smokeless tobacco: Never   Vaping Use    Vaping status: Never Used   Substance and Sexual Activity    Alcohol use: Not Currently    Drug use: No    Sexual activity: Yes     Partners: Male     Birth control/protection: None     Family History   Problem Relation Age of Onset    High Cholesterol Mother     Breast Cancer Mother 35    Hypertension Mother     Thyroid Disease Mother         hypo    Stroke Paternal Grandfather        Allergies   Allergen Reactions    Amoxicillin-Pot Clavulanate Hives     19: Per  RN pt has tolerated Keflex in the past    Penicillins      Other reaction(s): Unable to Obtain  8/13/19: Per RN pt has tolerated Keflex in the past    Sulfamethoxazole-Trimethoprim Hives     Prior to Admission medications    Medication Sig Start Date End Date Taking? Authorizing Provider   ibuprofen (ADVIL;MOTRIN) 800 MG tablet Take 1 tablet by mouth every 8 hours as needed for Pain 8/21/24  Yes Mine Nur MD   Prenatal Vit-Fe Fumarate-FA (PRENATAL VITAMIN) 27-0.8 MG TABS Take 1 tablet by mouth daily 1/18/24  Yes Mine Nur MD   azithromycin (ZITHROMAX) 250 MG tablet 500mg on day 1 followed by 250mg on days 2 - 5  Patient not taking: Reported on 8/26/2024 8/17/24 8/27/24  Timothy Villa, APRN - CNP        Review of Systems: History obtained from the patient  Constitutional: negative for weight loss, fever, night sweats  Breast: negative for breast lumps, nipple discharge, galactorrhea  GI: negative for change in bowel habits, abdominal pain, black or bloody stools  : negative for frequency, dysuria, hematuria, vaginal discharge  MSK: negative for back pain, joint pain, muscle pain  Skin: negative for itching, rash, hives  Psych: negative for anxiety, depression, change in mood      Objective:  /74   Pulse 74   Ht 1.753 m (5' 9\")   Wt (!) 140.3 kg (309 lb 6.4 oz)   BMI 45.69 kg/m²     Physical Exam:   PHYSICAL EXAMINATION    Constitutional  Appearance: well-nourished, well developed, alert, in no acute distress      LE: edema 3+  Skin  General Inspection: no rash, no lesions identified    Neurologic/Psychiatric  Mental Status:  Orientation: grossly oriented to person, place and time  Mood and Affect: mood normal, affect appropriate      ASSESSMENT:    ICD-10-CM    1. Postpartum follow-up  Z39.2       2. Elevated blood pressure reading without diagnosis of hypertension  R03.0           PLAN:  DONG removed  RTO at 6 weeks    RTO prn if symptoms persist or worsen.  Instructions given to

## 2024-09-10 NOTE — PROGRESS NOTES
India Franklin is a 39 y.o. female returns for a routine post-partum follow-up visit     Chief Complaint   Patient presents with    Postpartum Care     Postpartum Depression: Medium Risk (2024)    [place]  Depression Scale     Last EPDS Total Score: 6     Last EPDS Self Harm Result: Never      Type of delivery: repeat  section  Date of Delivery: 2024  Breastfeeding: yes  Bleeding Resolved: no  Birth Control: abstinence.  Last Pap: Normal, HPV Negtaive obtained 2 year(s) ago 22    Problems: would like to discuss BC    1. Have you been to the ER, urgent care clinic, or hospitalized since your last visit? No    2. Have you seen or consulted any other health care providers outside of the [facility] since your last visit? No    Examination chaperoned by Anita Calabrese LPN.

## 2024-09-12 LAB
CLINDAMYCIN ISLT KB: ABNORMAL
ORGANISM: ABNORMAL
SOURCE: ABNORMAL

## 2024-09-30 ENCOUNTER — POSTPARTUM VISIT (OUTPATIENT)
Age: 39
End: 2024-09-30

## 2024-09-30 VITALS
DIASTOLIC BLOOD PRESSURE: 75 MMHG | SYSTOLIC BLOOD PRESSURE: 124 MMHG | HEART RATE: 72 BPM | BODY MASS INDEX: 43.89 KG/M2 | WEIGHT: 293 LBS

## 2024-09-30 PROCEDURE — 0503F POSTPARTUM CARE VISIT: CPT | Performed by: OBSTETRICS & GYNECOLOGY

## 2024-09-30 NOTE — PROGRESS NOTES
Postpartum evaluation    India Franklin is a 39 y.o. female who presents for a postpartum exam.     She is now six weeks post repeat  section with bilateral salpingectomy    Her baby is doing well.    She has had no menses since delivery.     She has had the following significant problems since her delivery: none    The patient is breast feeding without difficulty.        She is currently taking: no medications.     She is due for her next AE in 4 months.     /75   Pulse 72   Wt 134.8 kg (297 lb 3.2 oz)   BMI 43.89 kg/m²     PHYSICAL EXAMINATION    Constitutional  Appearance: well-nourished, well developed, alert, in no acute distress    HENT  Head and Face: appears normal    Neck  Inspection/Palpation: normal appearance, no masses or tenderness  Lymph Nodes: no lymphadenopathy present  Thyroid: gland size normal, nontender, no nodules or masses present on palpation    Breasts  Inspection of Breasts: breasts symmetrical, no skin changes, no discharge present, nipple appearance normal, no skin retraction present  Palpation of Breasts and Axillae: no masses present on palpation, no breast tenderness  Axillary Lymph Nodes: no lymphadenopathy present    Gastrointestinal  Abdominal Examination: abdomen non-tender to palpation, normal bowel sounds, no masses present  Liver and spleen: no hepatomegaly present, spleen not palpable  Hernias: no hernias identified    Genitourinary  External Genitalia: normal appearance for age, no discharge present, no tenderness present, no inflammatory lesions present, no masses present, no atrophy present  Vagina: normal vaginal vault without central or paravaginal defects, no discharge present, no inflammatory lesions present, no masses present  Bladder: non-tender to palpation  Urethra: appears normal  Cervix: normal   Uterus: normal size, shape and consistency  Adnexa: no adnexal tenderness present, no adnexal masses present  Perineum: perineum within normal limits,

## 2024-10-23 ENCOUNTER — HOSPITAL ENCOUNTER (OUTPATIENT)
Facility: HOSPITAL | Age: 39
Discharge: HOME | End: 2024-10-26
Attending: INTERNAL MEDICINE
Payer: COMMERCIAL

## 2024-10-23 DIAGNOSIS — J18.9 PNEUMONIA DUE TO INFECTIOUS ORGANISM, UNSPECIFIED LATERALITY, UNSPECIFIED PART OF LUNG: ICD-10-CM

## 2024-10-23 PROCEDURE — 71250 CT THORAX DX C-: CPT

## 2024-12-09 ENCOUNTER — OFFICE VISIT (OUTPATIENT)
Facility: CLINIC | Age: 39
End: 2024-12-09
Payer: COMMERCIAL

## 2024-12-09 VITALS
HEART RATE: 56 BPM | DIASTOLIC BLOOD PRESSURE: 84 MMHG | WEIGHT: 293 LBS | RESPIRATION RATE: 18 BRPM | BODY MASS INDEX: 43.4 KG/M2 | SYSTOLIC BLOOD PRESSURE: 124 MMHG | HEIGHT: 69 IN | OXYGEN SATURATION: 93 % | TEMPERATURE: 97.8 F

## 2024-12-09 DIAGNOSIS — Z00.00 PREVENTATIVE HEALTH CARE: ICD-10-CM

## 2024-12-09 DIAGNOSIS — E66.01 OBESITY, MORBID: ICD-10-CM

## 2024-12-09 DIAGNOSIS — Z13.1 SCREENING FOR DIABETES MELLITUS (DM): ICD-10-CM

## 2024-12-09 DIAGNOSIS — K80.20 CALCULUS OF GALLBLADDER WITHOUT CHOLECYSTITIS WITHOUT OBSTRUCTION: ICD-10-CM

## 2024-12-09 DIAGNOSIS — Z87.09 HISTORY OF ASTHMA: ICD-10-CM

## 2024-12-09 DIAGNOSIS — Z80.3 FAMILY HISTORY OF BREAST CANCER: ICD-10-CM

## 2024-12-09 DIAGNOSIS — Z83.49 FH: THYROID DISEASE: ICD-10-CM

## 2024-12-09 DIAGNOSIS — Z00.00 PREVENTATIVE HEALTH CARE: Primary | ICD-10-CM

## 2024-12-09 LAB
ALBUMIN SERPL-MCNC: 3.5 G/DL (ref 3.5–5)
ALBUMIN/GLOB SERPL: 1 (ref 1.1–2.2)
ALP SERPL-CCNC: 99 U/L (ref 45–117)
ALT SERPL-CCNC: 30 U/L (ref 12–78)
ANION GAP SERPL CALC-SCNC: 5 MMOL/L (ref 2–12)
AST SERPL-CCNC: 18 U/L (ref 15–37)
BILIRUB SERPL-MCNC: 0.6 MG/DL (ref 0.2–1)
BUN SERPL-MCNC: 14 MG/DL (ref 6–20)
BUN/CREAT SERPL: 16 (ref 12–20)
CALCIUM SERPL-MCNC: 8.7 MG/DL (ref 8.5–10.1)
CHLORIDE SERPL-SCNC: 109 MMOL/L (ref 97–108)
CHOLEST SERPL-MCNC: 142 MG/DL
CO2 SERPL-SCNC: 27 MMOL/L (ref 21–32)
CREAT SERPL-MCNC: 0.89 MG/DL (ref 0.55–1.02)
ERYTHROCYTE [DISTWIDTH] IN BLOOD BY AUTOMATED COUNT: 15.1 % (ref 11.5–14.5)
EST. AVERAGE GLUCOSE BLD GHB EST-MCNC: 117 MG/DL
GLOBULIN SER CALC-MCNC: 3.5 G/DL (ref 2–4)
GLUCOSE SERPL-MCNC: 93 MG/DL (ref 65–100)
HBA1C MFR BLD: 5.7 % (ref 4–5.6)
HCT VFR BLD AUTO: 39.3 % (ref 35–47)
HDLC SERPL-MCNC: 51 MG/DL
HDLC SERPL: 2.8 (ref 0–5)
HGB BLD-MCNC: 12.6 G/DL (ref 11.5–16)
LDLC SERPL CALC-MCNC: 72 MG/DL (ref 0–100)
MCH RBC QN AUTO: 26.5 PG (ref 26–34)
MCHC RBC AUTO-ENTMCNC: 32.1 G/DL (ref 30–36.5)
MCV RBC AUTO: 82.7 FL (ref 80–99)
NRBC # BLD: 0 K/UL (ref 0–0.01)
NRBC BLD-RTO: 0 PER 100 WBC
PLATELET # BLD AUTO: 241 K/UL (ref 150–400)
PMV BLD AUTO: 10.6 FL (ref 8.9–12.9)
POTASSIUM SERPL-SCNC: 4.6 MMOL/L (ref 3.5–5.1)
PROT SERPL-MCNC: 7 G/DL (ref 6.4–8.2)
RBC # BLD AUTO: 4.75 M/UL (ref 3.8–5.2)
SODIUM SERPL-SCNC: 141 MMOL/L (ref 136–145)
TRIGL SERPL-MCNC: 95 MG/DL
TSH SERPL DL<=0.05 MIU/L-ACNC: 1.33 UIU/ML (ref 0.36–3.74)
VLDLC SERPL CALC-MCNC: 19 MG/DL
WBC # BLD AUTO: 5.2 K/UL (ref 3.6–11)

## 2024-12-09 PROCEDURE — 99395 PREV VISIT EST AGE 18-39: CPT | Performed by: INTERNAL MEDICINE

## 2024-12-09 PROCEDURE — 99213 OFFICE O/P EST LOW 20 MIN: CPT | Performed by: INTERNAL MEDICINE

## 2024-12-09 RX ORDER — TIRZEPATIDE 2.5 MG/.5ML
2.5 INJECTION, SOLUTION SUBCUTANEOUS WEEKLY
Qty: 2 ML | Refills: 0 | Status: SHIPPED | OUTPATIENT
Start: 2024-12-09

## 2024-12-09 NOTE — PROGRESS NOTES
India Franklin is a 39 y.o. 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   to Adelfo. Has 2 children, ages 5 and ages 3-month, born 8/19/2024.    History of breast cancer in her mother, cousin, maternal aunt.  She is seeing Dr. Nina and is planning for a bilateral mastectomy.    Obesity.  Has had significant difficulties losing weight.  Weight was 275 pounds in July 2021, 287 pounds December 2022.  Took phentermine prior to pregnancy and it helped w satiety.  Did not lose that much weight, however.     Started phentermine 2/6/23. Weight was 292 lb then.  Has tried low carbohydrate diet, increased physical activity is much as she could tolerate and have time for  Body mass index is 44.01 kg/m².    Wt Readings from Last 3 Encounters:   12/09/24 135.2 kg (298 lb)   09/30/24 134.8 kg (297 lb 3.2 oz)   08/26/24 (!) 140.3 kg (309 lb 6.4 oz)     Anxiety, reactive depression.  She stopped zoloft due to pregnancy and is doing well.   Started med 10/2020.  She is 3 months post-partum    Last breast exam: Breast specialist  Last PAP/pelvic: Per gynecology  Last colonoscopy: Not indicated  Health Maintenance   Topic Date Due    COVID-19 Vaccine (3 - 2023-24 season) 09/01/2024    Depression Screen  01/18/2025    A1C test (Diabetic or Prediabetic)  12/09/2025    Breast cancer screen  07/23/2027    Cervical cancer screen  12/22/2027    DTaP/Tdap/Td vaccine (5 - Td or Tdap) 05/30/2034    Flu vaccine  Completed    HIV screen  Completed    Hepatitis A vaccine  Aged Out    Hib vaccine  Aged Out    HPV vaccine  Aged Out    Polio vaccine  Aged Out    Meningococcal (ACWY) vaccine  Aged Out    Pneumococcal 0-64 years Vaccine  Aged Out    Hepatitis B vaccine  Discontinued    Varicella vaccine  Discontinued    Diabetes screen  Discontinued    Hepatitis C screen  Discontinued       Exercise: moderately active  Diet:

## 2024-12-09 NOTE — PROGRESS NOTES
Room:  I have reviewed all needed documentation in preparation for visit. Verified patient by name and date of birth  Chief Complaint   Patient presents with    Annual Exam       Vitals:    12/09/24 0854   BP: 124/84   Pulse: 56   Resp: 18   Temp: 97.8 °F (36.6 °C)   TempSrc: Temporal   SpO2: 93%   Weight: 135.2 kg (298 lb)   Height: 1.753 m (5' 9\")        Health Maintenance Due   Topic Date Due    Varicella vaccine (1 of 2 - 13+ 2-dose series) Never done    Hepatitis B vaccine (2 of 3 - 3-dose series) 01/29/2005    Pneumococcal 0-64 years Vaccine (2 of 2 - PCV) 10/22/2021    Flu vaccine (1) 08/01/2024    COVID-19 Vaccine (3 - 2023-24 season) 09/01/2024        1. \"Have you been to the ER, urgent care clinic since your last visit?  Hospitalized since your last visit?\" No     2. \"Have you seen or consulted any other health care providers outside of the [facility] since your last visit?\" No      3. For patients aged 45-75: Has the patient had a colonoscopy / FIT/ Cologuard? N/A      If the patient is female:    4. For patients aged 40-74: Has the patient had a mammogram within the past 2 years? N/A      5. For patients aged 21-65: Has the patient had a pap smear? Yes

## 2025-01-01 ENCOUNTER — TELEPHONE (OUTPATIENT)
Facility: CLINIC | Age: 40
End: 2025-01-01

## 2025-01-01 NOTE — TELEPHONE ENCOUNTER
PA submitted via cover my meds for zepbound     Use Key: [id number] to check status     Mychart msg sent to pt

## 2025-01-02 ENCOUNTER — PATIENT MESSAGE (OUTPATIENT)
Facility: CLINIC | Age: 40
End: 2025-01-02

## 2025-01-02 NOTE — TELEPHONE ENCOUNTER
(Key: [id number])  PA Case ID #: [id number]  Rx #: [id number]  Need Help? Call us at [phone]  Outcome Zepound  Approved today by [facility] [employer] 2017  Approved.  Authorization Expiration Date: 5/7/2025

## 2025-04-30 ENCOUNTER — TELEPHONE (OUTPATIENT)
Facility: CLINIC | Age: 40
End: 2025-04-30

## 2025-04-30 NOTE — TELEPHONE ENCOUNTER
T/C to patient regarding Zepbound and if she is on it and if so her current weight . This is necessary for authorization. No answer and LVM.

## 2025-05-02 ENCOUNTER — PATIENT MESSAGE (OUTPATIENT)
Facility: CLINIC | Age: 40
End: 2025-05-02

## 2025-05-19 ENCOUNTER — OFFICE VISIT (OUTPATIENT)
Age: 40
End: 2025-05-19
Payer: COMMERCIAL

## 2025-05-19 VITALS — BODY MASS INDEX: 38.95 KG/M2 | WEIGHT: 263 LBS | HEIGHT: 69 IN

## 2025-05-19 DIAGNOSIS — Z91.89 AT HIGH RISK FOR BREAST CANCER: Primary | ICD-10-CM

## 2025-05-19 DIAGNOSIS — Z80.3 FAMILY HISTORY OF BREAST CANCER: ICD-10-CM

## 2025-05-19 DIAGNOSIS — Z12.31 BREAST CANCER SCREENING BY MAMMOGRAM: ICD-10-CM

## 2025-05-19 PROCEDURE — 99213 OFFICE O/P EST LOW 20 MIN: CPT | Performed by: SURGERY

## 2025-05-19 NOTE — PROGRESS NOTES
HISTORY OF PRESENT ILLNESS  India Franklin is a 39 y.o. female     HPI ESTABLISHED patient here for annual high risk follow up. Patient stopped breastfeeding about 7 months ago. Patient denies any breast issues or concerns.     Last breast imaging was in 2023.     Family history-  Mother diagnosed with breast cancer age 35 (Trina Mejia), maternal cousin, maternal aunt also with breast cancer all post menopausal. Her mother does not carry the BRCA gene. Passed away in October 2022 from metastatic breast cancer.    Review of Systems      Physical Exam       ASSESSMENT and PLAN  {Assessment and Plan Chronic Disease:[id number]}

## 2025-05-19 NOTE — PROGRESS NOTES
HISTORY OF PRESENT ILLNESS  India Franklin is a 39 y.o. female.    HPI ESTABLISHED patient here for annual high risk follow up. Patient stopped breastfeeding about 7 months ago. Patient denies any breast issues or concerns.      Last breast imaging was in .      Family history-  Mother diagnosed with breast cancer age 35 (Trina Mejia), maternal cousin, maternal aunt also with breast cancer all post menopausal. Her mother does not carry the BRCA gene. Passed away in 2022 from metastatic breast cancer.      Past Medical History:   Diagnosis Date    COVID-19 2022    Dysmenorrhea     Dr. Nur    Ear infection     currently on azithromycin    Exercise-induced asthma     Family history of breast cancer     mother age 35.  cousin and aunt    Gallstone 2024    small, incidental    Gestational hypertension     first pregnancy    HPV vaccine counseling     gardasil series complete    Knee pain, chronic     softball injuries. cartillage damage    Morbid obesity (HCC)     TESSA (obstructive sleep apnea) 2023    mild TESSA on [employer] home sleep test.  AHI 7.3.  16 apneas in 14.8 hours    Patellofemoral arthralgia of both knees     hx of rowing, catching       Past Surgical History:   Procedure Laterality Date    BILATERAL SALPINGOOPHORECTOMY  2024     SECTION  2019    Son      SECTION N/A 2024     SECTION performed by Mine Nur MD at [facility] L[facility] OR       Social History     Socioeconomic History    Marital status:      Spouse name: Adelfo    Number of children: 2    Years of education: Not on file    Highest education level: Not on file   Occupational History    Not on file   Tobacco Use    Smoking status: Never    Smokeless tobacco: Never   Vaping Use    Vaping status: Never Used   Substance and Sexual Activity    Alcohol use: Yes    Drug use: No    Sexual activity: Not Currently     Partners: Male     Birth control/protection: None   Other Topics

## 2025-05-27 ENCOUNTER — HOSPITAL ENCOUNTER (OUTPATIENT)
Facility: HOSPITAL | Age: 40
Discharge: HOME | End: 2025-05-30
Attending: SURGERY
Payer: COMMERCIAL

## 2025-05-27 VITALS — HEIGHT: 69 IN | BODY MASS INDEX: 38.95 KG/M2 | WEIGHT: 263 LBS

## 2025-05-27 DIAGNOSIS — Z80.3 FAMILY HISTORY OF BREAST CANCER: ICD-10-CM

## 2025-05-27 DIAGNOSIS — Z12.31 BREAST CANCER SCREENING BY MAMMOGRAM: ICD-10-CM

## 2025-05-27 DIAGNOSIS — Z91.89 AT HIGH RISK FOR BREAST CANCER: ICD-10-CM

## 2025-05-27 PROCEDURE — 77063 BREAST TOMOSYNTHESIS BI: CPT

## 2025-05-29 ENCOUNTER — HOSPITAL ENCOUNTER (OUTPATIENT)
Facility: HOSPITAL | Age: 40
Discharge: HOME | End: 2025-05-29
Attending: SURGERY
Payer: COMMERCIAL

## 2025-05-29 DIAGNOSIS — Z80.3 FAMILY HISTORY OF BREAST CANCER: ICD-10-CM

## 2025-05-29 DIAGNOSIS — Z91.89 AT HIGH RISK FOR BREAST CANCER: ICD-10-CM

## 2025-05-29 PROCEDURE — C8908 MRI W/O FOL W/CONT, BREAST,: HCPCS

## 2025-05-29 PROCEDURE — A9585 GADOBUTROL INJECTION: HCPCS | Performed by: SURGERY

## 2025-05-29 PROCEDURE — 6360000004 HC RX CONTRAST MEDICATION: Performed by: SURGERY

## 2025-05-29 RX ORDER — GADOBUTROL 604.72 MG/ML
12 INJECTION INTRAVENOUS
Status: COMPLETED | OUTPATIENT
Start: 2025-05-29 | End: 2025-05-29

## 2025-05-29 RX ADMIN — GADOBUTROL 12 ML: 604.72 INJECTION INTRAVENOUS at 08:26

## 2025-06-03 ENCOUNTER — TELEPHONE (OUTPATIENT)
Age: 40
End: 2025-06-03

## 2025-07-19 ENCOUNTER — TELEMEDICINE ON DEMAND (OUTPATIENT)
Age: 40
End: 2025-07-19
Payer: COMMERCIAL

## 2025-07-19 DIAGNOSIS — R50.9 FEVER AND CHILLS: ICD-10-CM

## 2025-07-19 DIAGNOSIS — U07.1 POSITIVE SELF-ADMINISTERED ANTIGEN TEST FOR COVID-19: Primary | ICD-10-CM

## 2025-07-19 DIAGNOSIS — R09.81 NASAL CONGESTION: ICD-10-CM

## 2025-07-19 PROCEDURE — 99213 OFFICE O/P EST LOW 20 MIN: CPT | Performed by: NURSE PRACTITIONER

## 2025-07-19 ASSESSMENT — ENCOUNTER SYMPTOMS: COUGH: 0

## 2025-07-19 NOTE — PROGRESS NOTES
India Franklin, was evaluated through a synchronous (real-time) audio-video encounter. The patient (or guardian if applicable) is aware that this is a billable service, which includes applicable co-pays. This Virtual Visit was conducted with patient's (and/or legal guardian's) consent. Patient identification was verified, and a caregiver was present when appropriate.   [place] [id number] [facility] [address]  Provider was located at Home (Appt Dept State): [place]  Confirm you are appropriately licensed, registered, or certified to deliver care in the state where the patient is located as indicated above. If you are not or unsure, please re-schedule the visit: Yes, I confirm.     India Franklin (:  1985) is a Established patient, presenting virtually for evaluation of the following:    Tested positive with an at home COVID test today  Symptoms started 2 days ago with low-grade fever as high as 100.0, body aches and chills, nasal congestion x 2 days      Below is the assessment and plan developed based on review of pertinent history, physical exam, labs, studies, and medications.    Assessment & Plan  Positive self-administered antigen test for COVID-19   Problem    Orders:    nirmatrelvir/ritonavir 300/100 (PAXLOVID, 300/100,) 20 x 150 MG & 10 x 100MG TBPK; Take 3 tablets (two 150 mg nirmatrelvir and one 100 mg ritonavir tablets) by mouth every 12 hours for 5 days.    Risks and benefits reviewed with the patient. She would like treated with Paxlovid     Instructions for COVID-19    Isolate for 5 days  If you are without a fever for 24 hours without medication and symptoms are improving, you may go out  The mainstay of treatment for most people remains focused on symptom control and watching for signs of worsening illness.  You should drink plenty of fluids, eat when you are able, and rest is much as possible  Recommend treating symptoms with over-the-counter

## (undated) DEVICE — SUTURE VCRL SZ 0 L36IN ABSRB VLT L40MM CT 1/2 CIR J358H

## (undated) DEVICE — ELECTRODE PT RET AD L9FT HI MOIST COND ADH HYDRGEL CORDED

## (undated) DEVICE — SOLUTION IV 1000ML 0.9% SOD CHL

## (undated) DEVICE — TRAY PREP DRY W/ PREM GLV 2 APPL 6 SPNG 2 UNDPD 1 OVERWRAP

## (undated) DEVICE — AGENT HEMSTAT 3GM PURIFIED PLNT STARCH PWD ABSRB ARISTA AH

## (undated) DEVICE — STAPLER SKIN H3.9MM WIRE DIA0.58MM CRWN 6.9MM 35 STPL ROT

## (undated) DEVICE — C-SECTION II-LF: Brand: MEDLINE INDUSTRIES, INC.

## (undated) DEVICE — Z DISCONTINUED NO SUB IDED SPONGE LAPAROTOMY W18XL18IN WHITE STRUNG RADIOPAQUE STERILE

## (undated) DEVICE — GARMENT,MEDLINE,DVT,INT,CALF,MED, GEN2: Brand: MEDLINE

## (undated) DEVICE — POOLE SUCTION INSTRUMENT WITH REMOVABLE SHEATH: Brand: POOLE

## (undated) DEVICE — ROCKER SWITCH PENCIL HOLSTER: Brand: VALLEYLAB

## (undated) DEVICE — TOWEL,OR,DSP,ST,BLUE,STD,2/PK,40PK/CS: Brand: MEDLINE

## (undated) DEVICE — CONNEXT SURGICAL MATRIX - LARGE SYRINGE: Brand: CONNEXT SURGICAL MATRIX

## (undated) DEVICE — SOLIDIFIER FLUID 3000 CC ABSORB

## (undated) DEVICE — SUTURE PDS II SZ 1 L36IN ABSRB VLT CT L40MM 1/2 CIR TAPR Z359T

## (undated) DEVICE — PICO SINGLE USE NEGATIVE PRESSURE WOUND THERAPY SYSTEM 10CM X 30CM 4IN. X 12IN.: Brand: PICO

## (undated) DEVICE — EXTRA LARGE, DISPOSABLE C-SECTION PROTECTOR - RETRACTOR: Brand: ALEXIS ® O C-SECTION PROTECTOR - RETRACTOR

## (undated) DEVICE — STAPLER SKIN SQ 30 ABSRB STPL DISP INSORB

## (undated) DEVICE — Z DISCONTINUED USE 2220179 SUTURE VCRL SZ 0 L36IN ABSRB VLT L40MM CT 1/2 CIR J358H

## (undated) DEVICE — TIP CLEANER: Brand: VALLEYLAB

## (undated) DEVICE — DRESSING BORDERED ADH GZ UNIV GEN USE 8INX4IN AND 6INX2IN

## (undated) DEVICE — PENCIL ES L3M ROCK SWCH S STL HEX LOK BLDE ELECTRD HOLSTER

## (undated) DEVICE — CATH FOLEY 16F LUBRI-SIL IC --

## (undated) DEVICE — HANDLE LT SNAP ON ULT DURABLE LENS FOR TRUMPF ALC DISPOSABLE

## (undated) DEVICE — 3000CC GUARDIAN II: Brand: GUARDIAN

## (undated) DEVICE — CLEANER ES TIP W2XL2IN ADH BK RADPQ FOR S STL ELECTRD

## (undated) DEVICE — TRAY,URINE METER,100% SILICONE,16FR10ML: Brand: MEDLINE

## (undated) DEVICE — CONNEXT SURGICAL MATRIX - SMALL SYRINGE: Brand: CONNEXT SURGICAL MATRIX

## (undated) DEVICE — TRAY CATH OD16FR SIL URIN M STATLOK STBL DEV SURSTP

## (undated) DEVICE — REM POLYHESIVE ADULT PATIENT RETURN ELECTRODE: Brand: VALLEYLAB

## (undated) DEVICE — BULB SYRINGE, IRRIGATION WITH PROTECTIVE CAP, 60 CC, INDIVIDUALLY WRAPPED: Brand: DOVER

## (undated) DEVICE — STERILE LATEX POWDER-FREE SURGICAL GLOVESWITH NITRILE COATING: Brand: PROTEXIS

## (undated) DEVICE — (D)STRIP SKN CLSR 0.5X4IN WHT --

## (undated) DEVICE — Z INACTIVE NO ACTIVE SUPPLIER APPLICATOR MEDICATED 26 CC TINT HI-LITE ORNG STRL CHLORAPREP

## (undated) DEVICE — GOWN,AURORA,FABRIC-REINFORCED,X-LARGE: Brand: MEDLINE

## (undated) DEVICE — CATHETER URIN 16FR 30CC BLLN 2 W F LUBRI-SIL IC

## (undated) DEVICE — INTENT OT USE PROVIDES A STERILE INTERFACE BETWEEN THE OPERATING ROOM SURGICAL LAMPS (NON-STERILE) AND THE SURGEON OR STAFF WORKING IN THE STERILE FIELD.: Brand: ASPEN® ALC PLUS LIGHT HANDLE COVER

## (undated) DEVICE — BLADE CLIPPER GEN PURP NS

## (undated) DEVICE — 3M™ MEDIPORE™ H SOFT CLOTH SURGICAL TAPE, 2863, 3 IN X 10 YD, 12/CASE: Brand: 3M™ MEDIPORE™

## (undated) DEVICE — MASTISOL ADHESIVE LIQ 2/3ML

## (undated) DEVICE — SYRINGE IRRIG 60ML SFT PLIABLE BLB EZ TO GRP 1 HND USE W/

## (undated) DEVICE — (D)PREP SKN CHLRAPRP APPL 26ML -- CONVERT TO ITEM 371833

## (undated) DEVICE — KENDALL SCD EXPRESS SLEEVES, KNEE LENGTH, LARGE: Brand: KENDALL SCD